# Patient Record
Sex: FEMALE | Race: OTHER | NOT HISPANIC OR LATINO | Employment: FULL TIME | URBAN - METROPOLITAN AREA
[De-identification: names, ages, dates, MRNs, and addresses within clinical notes are randomized per-mention and may not be internally consistent; named-entity substitution may affect disease eponyms.]

---

## 2017-03-03 ENCOUNTER — HOSPITAL ENCOUNTER (EMERGENCY)
Facility: OTHER | Age: 27
Discharge: HOME | End: 2017-03-03
Attending: EMERGENCY MEDICINE
Payer: MEDICAID

## 2017-03-03 VITALS
WEIGHT: 150 LBS | DIASTOLIC BLOOD PRESSURE: 79 MMHG | TEMPERATURE: 98 F | SYSTOLIC BLOOD PRESSURE: 146 MMHG | HEART RATE: 86 BPM | RESPIRATION RATE: 18 BRPM | BODY MASS INDEX: 25.61 KG/M2 | HEIGHT: 64 IN | OXYGEN SATURATION: 98 %
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
## 2017-03-03 DIAGNOSIS — S16.1XXA CERVICAL STRAIN, INITIAL ENCOUNTER: Primary | ICD-10-CM

## 2017-03-03 LAB
B-HCG UR QL: NEGATIVE
CTP QC/QA: YES

## 2017-03-03 PROCEDURE — 25000003 PHARM REV CODE 250: Performed by: PHYSICIAN ASSISTANT

## 2017-03-03 PROCEDURE — 96372 THER/PROPH/DIAG INJ SC/IM: CPT

## 2017-03-03 PROCEDURE — 81025 URINE PREGNANCY TEST: CPT | Performed by: PHYSICIAN ASSISTANT

## 2017-03-03 PROCEDURE — 99284 EMERGENCY DEPT VISIT MOD MDM: CPT | Mod: 25

## 2017-03-03 PROCEDURE — 63600175 PHARM REV CODE 636 W HCPCS: Performed by: PHYSICIAN ASSISTANT

## 2017-03-03 RX ORDER — CYCLOBENZAPRINE HCL 10 MG
10 TABLET ORAL 3 TIMES DAILY PRN
Qty: 15 TABLET | Refills: 0 | Status: SHIPPED | OUTPATIENT
Start: 2017-03-03 | End: 2017-03-08

## 2017-03-03 RX ORDER — KETOROLAC TROMETHAMINE 30 MG/ML
30 INJECTION, SOLUTION INTRAMUSCULAR; INTRAVENOUS
Status: COMPLETED | OUTPATIENT
Start: 2017-03-03 | End: 2017-03-03

## 2017-03-03 RX ORDER — CYCLOBENZAPRINE HCL 10 MG
10 TABLET ORAL
Status: COMPLETED | OUTPATIENT
Start: 2017-03-03 | End: 2017-03-03

## 2017-03-03 RX ORDER — DICLOFENAC SODIUM 25 MG/1
25 TABLET, DELAYED RELEASE ORAL 3 TIMES DAILY PRN
Qty: 30 TABLET | Refills: 0 | Status: SHIPPED | OUTPATIENT
Start: 2017-03-03 | End: 2018-07-18

## 2017-03-03 RX ADMIN — CYCLOBENZAPRINE HYDROCHLORIDE 10 MG: 10 TABLET, FILM COATED ORAL at 07:03

## 2017-03-03 RX ADMIN — KETOROLAC TROMETHAMINE 30 MG: 30 INJECTION, SOLUTION INTRAMUSCULAR at 07:03
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
## 2018-07-09 ENCOUNTER — HOSPITAL ENCOUNTER (OUTPATIENT)
Dept: RADIOLOGY | Facility: OTHER | Age: 28
Discharge: HOME | End: 2018-07-09
Attending: NURSE PRACTITIONER
Payer: MEDICAID

## 2018-07-09 DIAGNOSIS — N83.209 UNSPECIFIED OVARIAN CYST, UNSPECIFIED SIDE: Primary | ICD-10-CM

## 2018-07-09 DIAGNOSIS — N83.209 UNSPECIFIED OVARIAN CYST, UNSPECIFIED SIDE: ICD-10-CM

## 2018-07-09 PROCEDURE — 76801 OB US < 14 WKS SINGLE FETUS: CPT | Mod: TC

## 2018-07-09 PROCEDURE — 76817 TRANSVAGINAL US OBSTETRIC: CPT | Mod: 26,,, | Performed by: RADIOLOGY

## 2018-07-09 PROCEDURE — 76801 OB US < 14 WKS SINGLE FETUS: CPT | Mod: 26,,, | Performed by: RADIOLOGY

## 2018-08-02 ENCOUNTER — TELEPHONE (OUTPATIENT)
Dept: OBSTETRICS AND GYNECOLOGY | Facility: CLINIC | Age: 28
End: 2018-08-02

## 2018-08-02 NOTE — TELEPHONE ENCOUNTER
----- Message from Mana Ravi sent at 8/2/2018  9:28 AM CDT -----  Name of Who is Calling: EDIN VIRAMONTES [[id number]]      What is the request in detail: Pt is currently being seen by and OB on the [facility] but would like to transfer to [facility] to see Dr. Monge. Pt is currently 12 weeks pregnant      Can the clinic reply by [facility]:  Yes       What Number to Call Back [facility] not [facility]: [phone]

## 2018-08-02 NOTE — TELEPHONE ENCOUNTER
Returned the pt's call to the clinic. Informed the patient that Dr. Fiore isn't currently accepting any new ob patients and that a message will be sent to her to inquire if she can see the patient for her pregnancy. Patient informed that she will be contacted once Dr. Fiore responds. Patient verbalized understanding.    Can you see this patient for her pregnancy?

## 2018-08-07 ENCOUNTER — TELEPHONE (OUTPATIENT)
Dept: OBSTETRICS AND GYNECOLOGY | Facility: CLINIC | Age: 28
End: 2018-08-07

## 2018-08-07 NOTE — TELEPHONE ENCOUNTER
Contacted the pt to inform that Dr. Fiore isn't taking new ob's and that she recommends her colleagues Dr. Salas and Dr. Burrell. Pt agreed to an appointment with Dr. aSlas at the [facility] location on 9/14 at 3:45PM. Pt stated that [facility]. Pt will transfer over to Dr. Salas's care on 9/14. Pt informed of the appointment location date and time. Pt verbalized understanding. Letter sent out.

## 2018-09-14 ENCOUNTER — ROUTINE PRENATAL (OUTPATIENT)
Dept: OBSTETRICS AND GYNECOLOGY | Facility: CLINIC | Age: 28
End: 2018-09-14
Attending: OBSTETRICS & GYNECOLOGY
Payer: COMMERCIAL

## 2018-09-14 ENCOUNTER — LAB VISIT (OUTPATIENT)
Dept: LAB | Facility: OTHER | Age: 28
End: 2018-09-14
Attending: OBSTETRICS & GYNECOLOGY
Payer: COMMERCIAL

## 2018-09-14 VITALS
WEIGHT: 186.94 LBS | SYSTOLIC BLOOD PRESSURE: 100 MMHG | DIASTOLIC BLOOD PRESSURE: 60 MMHG | BODY MASS INDEX: 32.09 KG/M2

## 2018-09-14 DIAGNOSIS — Z34.90 PREGNANCY WITH ONE FETUS, ANTEPARTUM: Primary | ICD-10-CM

## 2018-09-14 DIAGNOSIS — Z34.90 PREGNANCY WITH ONE FETUS, ANTEPARTUM: ICD-10-CM

## 2018-09-14 PROCEDURE — 3008F BODY MASS INDEX DOCD: CPT | Mod: CPTII,S$GLB,, | Performed by: OBSTETRICS & GYNECOLOGY

## 2018-09-14 PROCEDURE — 81511 FTL CGEN ABNOR FOUR ANAL: CPT

## 2018-09-14 PROCEDURE — 99999 PR PBB SHADOW E&M-EST. PATIENT-LVL II: CPT | Mod: PBBFAC,,, | Performed by: OBSTETRICS & GYNECOLOGY

## 2018-09-14 PROCEDURE — 99204 OFFICE O/P NEW MOD 45 MIN: CPT | Mod: S$GLB,,, | Performed by: OBSTETRICS & GYNECOLOGY

## 2018-09-14 PROCEDURE — 36415 COLL VENOUS BLD VENIPUNCTURE: CPT
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
## 2018-09-20 LAB
# FETUSES US: NORMAL
2ND TRIMESTER 4 SCREEN PNL SERPL: NEGATIVE
2ND TRIMESTER 4 SCREEN SERPL-IMP: NORMAL
AFP MOM SERPL: 0.92
AFP SERPL-MCNC: 42.2 NG/ML
AGE AT DELIVERY: 28
B-HCG MOM SERPL: 1.67
B-HCG SERPL-ACNC: 36.7 IU/ML
FET TS 21 RISK FROM MAT AGE: NORMAL
GA (DAYS): 3 D
GA (WEEKS): 18 WK
GA METHOD: NORMAL
IDDM PATIENT QL: NORMAL
INHIBIN A MOM SERPL: 0.35
INHIBIN A SERPL-MCNC: 55.7 PG/ML
SMOKING STATUS FTND: NORMAL
TS 18 RISK FETUS: NORMAL
TS 21 RISK FETUS: NORMAL
U ESTRIOL MOM SERPL: 1.05
U ESTRIOL SERPL-MCNC: 1.32 NG/ML

## 2018-09-21 ENCOUNTER — PROCEDURE VISIT (OUTPATIENT)
Dept: MATERNAL FETAL MEDICINE | Facility: CLINIC | Age: 28
End: 2018-09-21
Attending: OBSTETRICS & GYNECOLOGY
Payer: COMMERCIAL

## 2018-09-21 DIAGNOSIS — Z36.89 ENCOUNTER FOR FETAL ANATOMIC SURVEY: ICD-10-CM

## 2018-09-21 DIAGNOSIS — Z34.90 PREGNANCY WITH ONE FETUS, ANTEPARTUM: ICD-10-CM

## 2018-09-21 PROCEDURE — 76805 OB US >/= 14 WKS SNGL FETUS: CPT | Mod: S$GLB,,, | Performed by: OBSTETRICS & GYNECOLOGY

## 2018-09-21 PROCEDURE — 99499 UNLISTED E&M SERVICE: CPT | Mod: S$GLB,,, | Performed by: OBSTETRICS & GYNECOLOGY

## 2018-10-19 ENCOUNTER — ROUTINE PRENATAL (OUTPATIENT)
Dept: OBSTETRICS AND GYNECOLOGY | Facility: CLINIC | Age: 28
End: 2018-10-19
Attending: OBSTETRICS & GYNECOLOGY
Payer: COMMERCIAL

## 2018-10-19 VITALS — WEIGHT: 187.38 LBS | SYSTOLIC BLOOD PRESSURE: 98 MMHG | DIASTOLIC BLOOD PRESSURE: 72 MMHG | BODY MASS INDEX: 32.17 KG/M2

## 2018-10-19 DIAGNOSIS — Z34.90 PREGNANCY WITH ONE FETUS, ANTEPARTUM: Primary | ICD-10-CM

## 2018-10-19 PROCEDURE — 0502F SUBSEQUENT PRENATAL CARE: CPT | Mod: S$GLB,,, | Performed by: OBSTETRICS & GYNECOLOGY

## 2018-10-19 NOTE — PROGRESS NOTES
Doing well, no complaints. Poor sleep, waking middle of night, trouble falling back to sleep.  Discussed that she can try benadryl or unisom, also meditation and breathing exercises.  Discussed anatomy scan.  F/u in 4 weeks, ob glucose with next visit.

## 2018-11-07 ENCOUNTER — TELEPHONE (OUTPATIENT)
Dept: OBSTETRICS AND GYNECOLOGY | Facility: CLINIC | Age: 28
End: 2018-11-07

## 2018-11-07 NOTE — TELEPHONE ENCOUNTER
----- Message from Mana Ravi sent at 11/7/2018 12:49 PM CST -----  Name of Who is Calling: EDIN GORDON [[id number]]    What is the request in detail: Pt would like to reschedule her OB appt.       Can the clinic reply by [facility]:    No       What [facility][phone]        Left message for patient

## 2018-11-21 ENCOUNTER — LAB VISIT (OUTPATIENT)
Dept: LAB | Facility: OTHER | Age: 28
End: 2018-11-21
Attending: OBSTETRICS & GYNECOLOGY
Payer: COMMERCIAL

## 2018-11-21 DIAGNOSIS — Z34.90 PREGNANCY WITH ONE FETUS, ANTEPARTUM: ICD-10-CM

## 2018-11-21 LAB
BASOPHILS # BLD AUTO: 0.02 K/UL
BASOPHILS NFR BLD: 0.2 %
DIFFERENTIAL METHOD: ABNORMAL
EOSINOPHIL # BLD AUTO: 0.1 K/UL
EOSINOPHIL NFR BLD: 0.7 %
ERYTHROCYTE [DISTWIDTH] IN BLOOD BY AUTOMATED COUNT: 14 %
GLUCOSE SERPL-MCNC: 109 MG/DL
HCT VFR BLD AUTO: 31.4 %
HGB BLD-MCNC: 10.4 G/DL
LYMPHOCYTES # BLD AUTO: 1.7 K/UL
LYMPHOCYTES NFR BLD: 20.6 %
MCH RBC QN AUTO: 28.1 PG
MCHC RBC AUTO-ENTMCNC: 33.1 G/DL
MCV RBC AUTO: 85 FL
MONOCYTES # BLD AUTO: 0.5 K/UL
MONOCYTES NFR BLD: 6.6 %
NEUTROPHILS # BLD AUTO: 5.7 K/UL
NEUTROPHILS NFR BLD: 71.3 %
PLATELET # BLD AUTO: 225 K/UL
PMV BLD AUTO: 10.4 FL
RBC # BLD AUTO: 3.7 M/UL
WBC # BLD AUTO: 8.06 K/UL

## 2018-11-21 PROCEDURE — 82950 GLUCOSE TEST: CPT

## 2018-11-21 PROCEDURE — 36415 COLL VENOUS BLD VENIPUNCTURE: CPT

## 2018-11-21 PROCEDURE — 85025 COMPLETE CBC W/AUTO DIFF WBC: CPT

## 2018-11-30 ENCOUNTER — ROUTINE PRENATAL (OUTPATIENT)
Dept: OBSTETRICS AND GYNECOLOGY | Facility: CLINIC | Age: 28
End: 2018-11-30
Attending: OBSTETRICS & GYNECOLOGY
Payer: COMMERCIAL

## 2018-11-30 VITALS — DIASTOLIC BLOOD PRESSURE: 70 MMHG | SYSTOLIC BLOOD PRESSURE: 124 MMHG

## 2018-11-30 DIAGNOSIS — Z34.93 PREGNANCY WITH ONE FETUS IN THIRD TRIMESTER: ICD-10-CM

## 2018-11-30 PROCEDURE — 0502F SUBSEQUENT PRENATAL CARE: CPT | Mod: S$GLB,,, | Performed by: OBSTETRICS & GYNECOLOGY

## 2018-11-30 NOTE — PROGRESS NOTES
Reports that she is still waking at night, concerned about sleeping on back accidentally.  Reports some continued hip pain and discussed support belt.  Plans for tdap  Discussed peds, [facility].  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  F/u 2 weeks.

## 2018-12-14 ENCOUNTER — ROUTINE PRENATAL (OUTPATIENT)
Dept: OBSTETRICS AND GYNECOLOGY | Facility: CLINIC | Age: 28
End: 2018-12-14
Attending: OBSTETRICS & GYNECOLOGY
Payer: COMMERCIAL

## 2018-12-14 VITALS
SYSTOLIC BLOOD PRESSURE: 116 MMHG | WEIGHT: 188.06 LBS | DIASTOLIC BLOOD PRESSURE: 60 MMHG | BODY MASS INDEX: 32.28 KG/M2

## 2018-12-14 DIAGNOSIS — Z34.93 PREGNANCY WITH ONE FETUS IN THIRD TRIMESTER: Primary | ICD-10-CM

## 2018-12-14 PROCEDURE — 0502F SUBSEQUENT PRENATAL CARE: CPT | Mod: S$GLB,,, | Performed by: OBSTETRICS & GYNECOLOGY

## 2018-12-14 NOTE — PROGRESS NOTES
Patient seen and examined.  No complaints, denies VB/LOF/Ctxns, reports good fetal movement. Precautions for Bleeding/ ROM/ Decreased fetal movement/ Pre-E reviewed.  Anxiety improved.  Pump rx given.  F/U scheduled 1 weeks

## 2018-12-21 ENCOUNTER — ROUTINE PRENATAL (OUTPATIENT)
Dept: OBSTETRICS AND GYNECOLOGY | Facility: CLINIC | Age: 28
End: 2018-12-21
Attending: OBSTETRICS & GYNECOLOGY
Payer: COMMERCIAL

## 2018-12-21 VITALS
DIASTOLIC BLOOD PRESSURE: 60 MMHG | WEIGHT: 190.25 LBS | BODY MASS INDEX: 32.66 KG/M2 | SYSTOLIC BLOOD PRESSURE: 120 MMHG

## 2018-12-21 DIAGNOSIS — Z34.93 PREGNANCY WITH ONE FETUS IN THIRD TRIMESTER: ICD-10-CM

## 2018-12-21 PROCEDURE — 0502F SUBSEQUENT PRENATAL CARE: CPT | Mod: S$GLB,,, | Performed by: OBSTETRICS & GYNECOLOGY

## 2019-01-04 ENCOUNTER — ROUTINE PRENATAL (OUTPATIENT)
Dept: OBSTETRICS AND GYNECOLOGY | Facility: CLINIC | Age: 29
End: 2019-01-04
Attending: OBSTETRICS & GYNECOLOGY
Payer: COMMERCIAL

## 2019-01-04 VITALS — SYSTOLIC BLOOD PRESSURE: 98 MMHG | DIASTOLIC BLOOD PRESSURE: 60 MMHG | WEIGHT: 184.5 LBS | BODY MASS INDEX: 31.67 KG/M2

## 2019-01-04 DIAGNOSIS — O99.013 ANEMIA DURING PREGNANCY IN THIRD TRIMESTER: ICD-10-CM

## 2019-01-04 DIAGNOSIS — Z34.93 PREGNANCY WITH ONE FETUS IN THIRD TRIMESTER: ICD-10-CM

## 2019-01-04 PROCEDURE — 0502F PR SUBSEQUENT PRENATAL CARE: ICD-10-PCS | Mod: S$GLB,,, | Performed by: OBSTETRICS & GYNECOLOGY

## 2019-01-04 PROCEDURE — 0502F SUBSEQUENT PRENATAL CARE: CPT | Mod: S$GLB,,, | Performed by: OBSTETRICS & GYNECOLOGY

## 2019-01-17 ENCOUNTER — ROUTINE PRENATAL (OUTPATIENT)
Dept: OBSTETRICS AND GYNECOLOGY | Facility: CLINIC | Age: 29
End: 2019-01-17
Attending: OBSTETRICS & GYNECOLOGY
Payer: COMMERCIAL

## 2019-01-17 ENCOUNTER — LAB VISIT (OUTPATIENT)
Dept: LAB | Facility: OTHER | Age: 29
End: 2019-01-17
Attending: OBSTETRICS & GYNECOLOGY
Payer: COMMERCIAL

## 2019-01-17 VITALS
DIASTOLIC BLOOD PRESSURE: 60 MMHG | SYSTOLIC BLOOD PRESSURE: 104 MMHG | WEIGHT: 185.19 LBS | BODY MASS INDEX: 31.79 KG/M2

## 2019-01-17 DIAGNOSIS — O99.013 ANEMIA DURING PREGNANCY IN THIRD TRIMESTER: ICD-10-CM

## 2019-01-17 DIAGNOSIS — Z34.93 PREGNANCY WITH ONE FETUS IN THIRD TRIMESTER: ICD-10-CM

## 2019-01-17 DIAGNOSIS — Z34.93 PREGNANCY WITH ONE FETUS IN THIRD TRIMESTER: Primary | ICD-10-CM

## 2019-01-17 LAB
BASOPHILS # BLD AUTO: 0.01 K/UL
BASOPHILS NFR BLD: 0.1 %
DIFFERENTIAL METHOD: ABNORMAL
EOSINOPHIL # BLD AUTO: 0.1 K/UL
EOSINOPHIL NFR BLD: 0.9 %
ERYTHROCYTE [DISTWIDTH] IN BLOOD BY AUTOMATED COUNT: 14.7 %
HCT VFR BLD AUTO: 30 %
HGB BLD-MCNC: 9.9 G/DL
LYMPHOCYTES # BLD AUTO: 1.5 K/UL
LYMPHOCYTES NFR BLD: 22.5 %
MCH RBC QN AUTO: 27.2 PG
MCHC RBC AUTO-ENTMCNC: 33 G/DL
MCV RBC AUTO: 82 FL
MONOCYTES # BLD AUTO: 0.5 K/UL
MONOCYTES NFR BLD: 6.7 %
NEUTROPHILS # BLD AUTO: 4.7 K/UL
NEUTROPHILS NFR BLD: 68.8 %
PLATELET # BLD AUTO: 201 K/UL
PMV BLD AUTO: 10.8 FL
RBC # BLD AUTO: 3.64 M/UL
WBC # BLD AUTO: 6.83 K/UL

## 2019-01-17 PROCEDURE — 86592 SYPHILIS TEST NON-TREP QUAL: CPT

## 2019-01-17 PROCEDURE — 87081 CULTURE SCREEN ONLY: CPT

## 2019-01-17 PROCEDURE — 85025 COMPLETE CBC W/AUTO DIFF WBC: CPT

## 2019-01-17 PROCEDURE — 99999 PR PBB SHADOW E&M-EST. PATIENT-LVL II: ICD-10-PCS | Mod: PBBFAC,,, | Performed by: OBSTETRICS & GYNECOLOGY

## 2019-01-17 PROCEDURE — 36415 COLL VENOUS BLD VENIPUNCTURE: CPT

## 2019-01-17 PROCEDURE — 99999 PR PBB SHADOW E&M-EST. PATIENT-LVL II: CPT | Mod: PBBFAC,,, | Performed by: OBSTETRICS & GYNECOLOGY

## 2019-01-17 PROCEDURE — 0502F PR SUBSEQUENT PRENATAL CARE: ICD-10-PCS | Mod: S$GLB,,, | Performed by: OBSTETRICS & GYNECOLOGY

## 2019-01-17 PROCEDURE — 86703 HIV-1/HIV-2 1 RESULT ANTBDY: CPT

## 2019-01-17 PROCEDURE — 0502F SUBSEQUENT PRENATAL CARE: CPT | Mod: S$GLB,,, | Performed by: OBSTETRICS & GYNECOLOGY

## 2019-01-17 NOTE — PROGRESS NOTES
Reports contractions off and on, hip pain, general discomfort.  Discussed induction timing, will consider.  Cervix without dilation but favorable  Precautions reviewed.  F/U in 1 weeks

## 2019-01-18 LAB
HIV 1+2 AB+HIV1 P24 AG SERPL QL IA: NEGATIVE
RPR SER QL: NORMAL

## 2019-01-21 LAB — BACTERIA SPEC AEROBE CULT: NORMAL

## 2019-01-22 ENCOUNTER — ROUTINE PRENATAL (OUTPATIENT)
Dept: OBSTETRICS AND GYNECOLOGY | Facility: CLINIC | Age: 29
End: 2019-01-22
Attending: OBSTETRICS & GYNECOLOGY
Payer: COMMERCIAL

## 2019-01-22 ENCOUNTER — IMMUNIZATION (OUTPATIENT)
Dept: PHARMACY | Facility: CLINIC | Age: 29
End: 2019-01-22
Payer: COMMERCIAL

## 2019-01-22 VITALS
BODY MASS INDEX: 31.62 KG/M2 | DIASTOLIC BLOOD PRESSURE: 60 MMHG | WEIGHT: 184.19 LBS | SYSTOLIC BLOOD PRESSURE: 112 MMHG

## 2019-01-22 DIAGNOSIS — Z34.93 PREGNANCY WITH ONE FETUS IN THIRD TRIMESTER: ICD-10-CM

## 2019-01-22 DIAGNOSIS — O99.013 ANEMIA DURING PREGNANCY IN THIRD TRIMESTER: ICD-10-CM

## 2019-01-22 PROCEDURE — 0502F SUBSEQUENT PRENATAL CARE: CPT | Mod: S$GLB,,, | Performed by: OBSTETRICS & GYNECOLOGY

## 2019-01-22 PROCEDURE — 0502F PR SUBSEQUENT PRENATAL CARE: ICD-10-PCS | Mod: S$GLB,,, | Performed by: OBSTETRICS & GYNECOLOGY

## 2019-01-22 NOTE — PROGRESS NOTES
Patient seen and examined.  No complaints, denies VB/LOF/Ctxns, reports good fetal movement. Precautions for Bleeding/ ROM/ Decreased fetal movement/ Pre-E reviewed.  F/U scheduled 1 weeks  Indxn scheduled 2/7/19 at 9pm for cytotec.

## 2019-01-30 ENCOUNTER — HOSPITAL ENCOUNTER (EMERGENCY)
Facility: OTHER | Age: 29
Discharge: HOME | End: 2019-01-30
Attending: OBSTETRICS & GYNECOLOGY
Payer: COMMERCIAL

## 2019-01-30 ENCOUNTER — TELEPHONE (OUTPATIENT)
Dept: OBSTETRICS AND GYNECOLOGY | Facility: CLINIC | Age: 29
End: 2019-01-30

## 2019-01-30 VITALS
DIASTOLIC BLOOD PRESSURE: 70 MMHG | OXYGEN SATURATION: 99 % | HEART RATE: 99 BPM | TEMPERATURE: 98 F | RESPIRATION RATE: 18 BRPM | SYSTOLIC BLOOD PRESSURE: 106 MMHG
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
## 2019-01-30 DIAGNOSIS — Z3A.38 38 WEEKS GESTATION OF PREGNANCY: Primary | ICD-10-CM

## 2019-01-30 PROCEDURE — 59025 FETAL NON-STRESS TEST: CPT

## 2019-01-30 PROCEDURE — 59025 PR FETAL 2N-STRESS TEST: ICD-10-PCS | Mod: 26,,, | Performed by: OBSTETRICS & GYNECOLOGY

## 2019-01-30 PROCEDURE — 99284 EMERGENCY DEPT VISIT MOD MDM: CPT | Mod: 25

## 2019-01-30 PROCEDURE — 59025 FETAL NON-STRESS TEST: CPT | Mod: 26,,, | Performed by: OBSTETRICS & GYNECOLOGY

## 2019-01-30 PROCEDURE — 99283 PR EMERGENCY DEPT VISIT,LEVEL III: ICD-10-PCS | Mod: 25,,, | Performed by: OBSTETRICS & GYNECOLOGY

## 2019-01-30 PROCEDURE — 99283 EMERGENCY DEPT VISIT LOW MDM: CPT | Mod: 25,,, | Performed by: OBSTETRICS & GYNECOLOGY

## 2019-01-30 NOTE — TELEPHONE ENCOUNTER
----- Message from Stella Bates sent at 1/30/2019  2:57 PM CST -----  Contact: pt  Name of Who is Calling: EDIN VIRAMONTES [[id number]]    What is the request in detail: pt returning call.. Please advise      Can the clinic reply by [facility]: no      What Number to [facility] Back [facility][phone]            Spoke with patient to inform patient we will be at [facility] on Friday

## 2019-01-30 NOTE — ED PROVIDER NOTES
Encounter Date: 2019       History     Chief Complaint   Patient presents with    Fall     27 yo  at 38w1d presents after a fall at work around 1 pm. She states that she sat on a three legged table and it was unsteady. She fell onto her buttocks and back. She did not hit her belly or her head. She has not had any contractions, bleeding, leakage of fluid. Reports normal fetal movement. This pregnancy is complicated by history of anemia.          Review of patient's allergies indicates:   Allergen Reactions    Penicillins Rash     Past Medical History:   Diagnosis Date    GERD (gastroesophageal reflux disease)      Past Surgical History:   Procedure Laterality Date    EYE SURGERY      strabismus     Family History   Problem Relation Age of Onset    Melanoma Neg Hx     Breast cancer Neg Hx     Colon cancer Neg Hx     Ovarian cancer Neg Hx      Social History     Tobacco Use    Smoking status: Never Smoker    Smokeless tobacco: Never Used   Substance Use Topics    Alcohol use: No    Drug use: No     Review of Systems   Constitutional: Negative for chills and fever.   Eyes: Negative for photophobia and visual disturbance.   Respiratory: Negative for chest tightness and shortness of breath.    Cardiovascular: Negative for chest pain.   Gastrointestinal: Negative for abdominal pain, constipation, diarrhea, nausea and vomiting.   Genitourinary: Negative for pelvic pain, vaginal bleeding and vaginal discharge.   Neurological: Negative for dizziness, light-headedness and headaches.   Psychiatric/Behavioral: Negative for suicidal ideas.       Physical Exam     Initial Vitals [19 1610]   BP Pulse Resp Temp SpO2   106/70 99 18 97.6 °F (36.4 °C) 99 %      MAP       --         Physical Exam    Vitals reviewed.  Constitutional: She appears well-developed and well-nourished. She is not diaphoretic. No distress.   HENT:   Head: Normocephalic and atraumatic.   Nose: Nose normal.   Eyes: Conjunctivae are  normal.   Neck: Normal range of motion.   Cardiovascular: Normal rate, regular rhythm, normal heart sounds and intact distal pulses.   Pulmonary/Chest: No respiratory distress.   Abdominal: Soft. She exhibits no distension. There is no tenderness.   Gravid   Musculoskeletal: She exhibits no edema or tenderness.   Neurological: She is alert and oriented to person, place, and time. She has normal strength. No sensory deficit.   Skin: Skin is warm and dry.   Psychiatric: She has a normal mood and affect. Her behavior is normal. Judgment and thought content normal.         ED Course   Obtain Fetal nonstress test (NST)  Date/Time: 1/30/2019 4:15 PM  Performed by: Zaira Sims MD  Authorized by: Zaira Sims MD     Nonstress Test:     Variability:  6-25 BPM    Decelerations:  None    Accelerations:  15 bpm    Baseline:  140    Contractions:  Irregular  Biophysical Profile:     Nonstress Test Interpretation: reactive      Overall Impression:  Reassuring          Labs Reviewed - No data to display       Imaging Results    None          Medical Decision Making:   ED Management:  Vital signs stable. NST reactive and reassuring. 4 hours after the fall, she is not feeling any contractions. A few irregular contractions visualized on tocometer. No bleeding. Patient stable for discharge. She has an appointment with Dr. Salas in 2 days. All questions answered.              Attending Attestation:   Physician Attestation Statement for Resident:  As the supervising MD   Physician Attestation Statement: I have personally seen and examined this patient.   I agree with the above history. -:   As the supervising MD I agree with the above PE.    As the supervising MD I agree with the above treatment, course, plan, and disposition.   -:     Rh Positive. No abdominal trauma.      I was personally present during the entire procedure.  I have reviewed and agree with the residents interpretation of the following: rhythm strips.   I have reviewed the following: old records at this facility.                       Clinical Impression:   The primary encounter diagnosis was 38 weeks gestation of pregnancy. A diagnosis of Fall, initial encounter was also pertinent to this visit.      Disposition:   Disposition: Discharged  Condition: Stable                        Zaira Sims MD  Resident  01/30/19 1735       Zaira Sims MD  Resident  01/30/19 1736       Veronica Mckeon MD  01/31/19 1012

## 2019-02-01 ENCOUNTER — ROUTINE PRENATAL (OUTPATIENT)
Dept: OBSTETRICS AND GYNECOLOGY | Facility: CLINIC | Age: 29
End: 2019-02-01
Attending: OBSTETRICS & GYNECOLOGY
Payer: COMMERCIAL

## 2019-02-01 VITALS
SYSTOLIC BLOOD PRESSURE: 110 MMHG | WEIGHT: 189.81 LBS | BODY MASS INDEX: 32.58 KG/M2 | DIASTOLIC BLOOD PRESSURE: 64 MMHG

## 2019-02-01 DIAGNOSIS — Z34.93 PREGNANCY WITH ONE FETUS IN THIRD TRIMESTER: Primary | ICD-10-CM

## 2019-02-01 DIAGNOSIS — O99.013 ANEMIA DURING PREGNANCY IN THIRD TRIMESTER: ICD-10-CM

## 2019-02-01 PROCEDURE — 0502F PR SUBSEQUENT PRENATAL CARE: ICD-10-PCS | Mod: S$GLB,,, | Performed by: OBSTETRICS & GYNECOLOGY

## 2019-02-01 PROCEDURE — 99999 PR PBB SHADOW E&M-EST. PATIENT-LVL II: ICD-10-PCS | Mod: PBBFAC,,, | Performed by: OBSTETRICS & GYNECOLOGY

## 2019-02-01 PROCEDURE — 0502F SUBSEQUENT PRENATAL CARE: CPT | Mod: S$GLB,,, | Performed by: OBSTETRICS & GYNECOLOGY

## 2019-02-01 PROCEDURE — 99999 PR PBB SHADOW E&M-EST. PATIENT-LVL II: CPT | Mod: PBBFAC,,, | Performed by: OBSTETRICS & GYNECOLOGY

## 2019-02-01 NOTE — PROGRESS NOTES
Patient seen and examined.  No complaints, denies VB/LOF/Ctxns, reports good fetal movement. Precautions for Bleeding/ ROM/ Decreased fetal movement/ Pre-E reviewed.  Doing well after fall a couple of days ago.   F/U scheduled 1 weeks

## 2019-02-07 ENCOUNTER — ANESTHESIA EVENT (OUTPATIENT)
Dept: OBSTETRICS AND GYNECOLOGY | Facility: OTHER | Age: 29
End: 2019-02-07
Payer: COMMERCIAL

## 2019-02-07 ENCOUNTER — ANESTHESIA (OUTPATIENT)
Dept: OBSTETRICS AND GYNECOLOGY | Facility: OTHER | Age: 29
End: 2019-02-07
Payer: COMMERCIAL

## 2019-02-07 ENCOUNTER — HOSPITAL ENCOUNTER (INPATIENT)
Facility: OTHER | Age: 29
LOS: 3 days | Discharge: HOME | End: 2019-02-10
Attending: OBSTETRICS & GYNECOLOGY | Admitting: OBSTETRICS & GYNECOLOGY
Payer: COMMERCIAL

## 2019-02-07 LAB
BASOPHILS # BLD AUTO: 0.03 K/UL
BASOPHILS NFR BLD: 0.3 %
DIFFERENTIAL METHOD: ABNORMAL
EOSINOPHIL # BLD AUTO: 0.1 K/UL
EOSINOPHIL NFR BLD: 0.8 %
ERYTHROCYTE [DISTWIDTH] IN BLOOD BY AUTOMATED COUNT: 14.9 %
HCT VFR BLD AUTO: 30.6 %
HGB BLD-MCNC: 10.1 G/DL
LYMPHOCYTES # BLD AUTO: 2.2 K/UL
LYMPHOCYTES NFR BLD: 25.6 %
MCH RBC QN AUTO: 26.9 PG
MCHC RBC AUTO-ENTMCNC: 33 G/DL
MCV RBC AUTO: 81 FL
MONOCYTES # BLD AUTO: 0.7 K/UL
MONOCYTES NFR BLD: 7.5 %
NEUTROPHILS # BLD AUTO: 5.6 K/UL
NEUTROPHILS NFR BLD: 64 %
PLATELET # BLD AUTO: 222 K/UL
PMV BLD AUTO: 11.1 FL
RBC # BLD AUTO: 3.76 M/UL
WBC # BLD AUTO: 8.75 K/UL

## 2019-02-07 PROCEDURE — 72100002 HC LABOR CARE, 1ST 8 HOURS: Performed by: OBSTETRICS & GYNECOLOGY

## 2019-02-07 PROCEDURE — 11000001 HC ACUTE MED/SURG PRIVATE ROOM

## 2019-02-07 PROCEDURE — 85025 COMPLETE CBC W/AUTO DIFF WBC: CPT

## 2019-02-07 PROCEDURE — 86850 RBC ANTIBODY SCREEN: CPT

## 2019-02-07 RX ORDER — CEFAZOLIN SODIUM 2 G/50ML
2 SOLUTION INTRAVENOUS
Status: DISCONTINUED | OUTPATIENT
Start: 2019-02-07 | End: 2019-02-08

## 2019-02-07 RX ORDER — OXYTOCIN/RINGER'S LACTATE 20/1000 ML
333 PLASTIC BAG, INJECTION (ML) INTRAVENOUS CONTINUOUS
Status: DISCONTINUED | OUTPATIENT
Start: 2019-02-08 | End: 2019-02-08

## 2019-02-07 RX ORDER — METOCLOPRAMIDE HYDROCHLORIDE 5 MG/ML
5 INJECTION INTRAMUSCULAR; INTRAVENOUS EVERY 6 HOURS PRN
Status: DISCONTINUED | OUTPATIENT
Start: 2019-02-08 | End: 2019-02-08

## 2019-02-07 RX ORDER — MISOPROSTOL 200 UG/1
600 TABLET ORAL
Status: DISCONTINUED | OUTPATIENT
Start: 2019-02-08 | End: 2019-02-08

## 2019-02-07 RX ORDER — METHYLERGONOVINE MALEATE 0.2 MG/ML
200 INJECTION INTRAVENOUS
Status: DISCONTINUED | OUTPATIENT
Start: 2019-02-08 | End: 2019-02-08

## 2019-02-07 RX ORDER — ONDANSETRON 8 MG/1
8 TABLET, ORALLY DISINTEGRATING ORAL EVERY 8 HOURS PRN
Status: DISCONTINUED | OUTPATIENT
Start: 2019-02-08 | End: 2019-02-08

## 2019-02-07 RX ORDER — OXYTOCIN/RINGER'S LACTATE 20/1000 ML
41.7 PLASTIC BAG, INJECTION (ML) INTRAVENOUS CONTINUOUS
Status: DISCONTINUED | OUTPATIENT
Start: 2019-02-07 | End: 2019-02-08

## 2019-02-07 RX ORDER — CARBOPROST TROMETHAMINE 250 UG/ML
250 INJECTION, SOLUTION INTRAMUSCULAR
Status: DISCONTINUED | OUTPATIENT
Start: 2019-02-08 | End: 2019-02-08

## 2019-02-07 RX ORDER — SODIUM CHLORIDE, SODIUM LACTATE, POTASSIUM CHLORIDE, CALCIUM CHLORIDE 600; 310; 30; 20 MG/100ML; MG/100ML; MG/100ML; MG/100ML
INJECTION, SOLUTION INTRAVENOUS CONTINUOUS
Status: DISCONTINUED | OUTPATIENT
Start: 2019-02-08 | End: 2019-02-08

## 2019-02-07 RX ORDER — SODIUM CHLORIDE 9 MG/ML
INJECTION, SOLUTION INTRAVENOUS
Status: DISCONTINUED | OUTPATIENT
Start: 2019-02-08 | End: 2019-02-08

## 2019-02-07 RX ADMIN — SODIUM CHLORIDE, SODIUM LACTATE, POTASSIUM CHLORIDE, AND CALCIUM CHLORIDE: .6; .31; .03; .02 INJECTION, SOLUTION INTRAVENOUS at 11:02

## 2019-02-08 LAB
ABO + RH BLD: NORMAL
BLD GP AB SCN CELLS X3 SERPL QL: NORMAL

## 2019-02-08 PROCEDURE — 25000003 PHARM REV CODE 250: Performed by: STUDENT IN AN ORGANIZED HEALTH CARE EDUCATION/TRAINING PROGRAM

## 2019-02-08 PROCEDURE — 72100003 HC LABOR CARE, EA. ADDL. 8 HRS: Performed by: OBSTETRICS & GYNECOLOGY

## 2019-02-08 PROCEDURE — 27800517 HC TRAY,EPIDURAL-CONTINUOUS: Performed by: STUDENT IN AN ORGANIZED HEALTH CARE EDUCATION/TRAINING PROGRAM

## 2019-02-08 PROCEDURE — 72200005 HC VAGINAL DELIVERY LEVEL II: Performed by: OBSTETRICS & GYNECOLOGY

## 2019-02-08 PROCEDURE — 59409 PRA ETRICAL CARE,VAG DELIV ONLY: ICD-10-PCS | Mod: QY,,, | Performed by: ANESTHESIOLOGY

## 2019-02-08 PROCEDURE — 59400 PR FULL ROUT OBSTE CARE,VAGINAL DELIV: ICD-10-PCS | Mod: GB,,, | Performed by: OBSTETRICS & GYNECOLOGY

## 2019-02-08 PROCEDURE — 59400 OBSTETRICAL CARE: CPT | Mod: GB,,, | Performed by: OBSTETRICS & GYNECOLOGY

## 2019-02-08 PROCEDURE — 11000001 HC ACUTE MED/SURG PRIVATE ROOM

## 2019-02-08 PROCEDURE — 27200682 HC TRAY, EPIDURAL PEDIATRIC: Performed by: STUDENT IN AN ORGANIZED HEALTH CARE EDUCATION/TRAINING PROGRAM

## 2019-02-08 PROCEDURE — 62326 NJX INTERLAMINAR LMBR/SAC: CPT | Performed by: STUDENT IN AN ORGANIZED HEALTH CARE EDUCATION/TRAINING PROGRAM

## 2019-02-08 PROCEDURE — 59409 OBSTETRICAL CARE: CPT | Mod: QY,,, | Performed by: ANESTHESIOLOGY

## 2019-02-08 PROCEDURE — 63600175 PHARM REV CODE 636 W HCPCS: Performed by: STUDENT IN AN ORGANIZED HEALTH CARE EDUCATION/TRAINING PROGRAM

## 2019-02-08 RX ORDER — ONDANSETRON 8 MG/1
8 TABLET, ORALLY DISINTEGRATING ORAL EVERY 8 HOURS PRN
Status: DISCONTINUED | OUTPATIENT
Start: 2019-02-08 | End: 2019-02-10 | Stop reason: HOSPADM

## 2019-02-08 RX ORDER — HYDROCODONE BITARTRATE AND ACETAMINOPHEN 10; 325 MG/1; MG/1
1 TABLET ORAL EVERY 4 HOURS PRN
Status: DISCONTINUED | OUTPATIENT
Start: 2019-02-08 | End: 2019-02-10 | Stop reason: HOSPADM

## 2019-02-08 RX ORDER — HYDROCODONE BITARTRATE AND ACETAMINOPHEN 5; 325 MG/1; MG/1
1 TABLET ORAL EVERY 4 HOURS PRN
Status: DISCONTINUED | OUTPATIENT
Start: 2019-02-08 | End: 2019-02-10 | Stop reason: HOSPADM

## 2019-02-08 RX ORDER — FENTANYL/BUPIVACAINE/NS/PF 2MCG/ML-.1
PLASTIC BAG, INJECTION (ML) INJECTION
Status: DISCONTINUED | OUTPATIENT
Start: 2019-02-08 | End: 2019-02-08

## 2019-02-08 RX ORDER — LIDOCAINE HYDROCHLORIDE AND EPINEPHRINE 15; 5 MG/ML; UG/ML
INJECTION, SOLUTION EPIDURAL
Status: DISCONTINUED | OUTPATIENT
Start: 2019-02-08 | End: 2019-02-08

## 2019-02-08 RX ORDER — DOCUSATE SODIUM 100 MG/1
200 CAPSULE, LIQUID FILLED ORAL 2 TIMES DAILY PRN
Status: DISCONTINUED | OUTPATIENT
Start: 2019-02-08 | End: 2019-02-10 | Stop reason: HOSPADM

## 2019-02-08 RX ORDER — OXYTOCIN/RINGER'S LACTATE 20/1000 ML
2 PLASTIC BAG, INJECTION (ML) INTRAVENOUS CONTINUOUS
Status: DISCONTINUED | OUTPATIENT
Start: 2019-02-08 | End: 2019-02-08

## 2019-02-08 RX ORDER — FENTANYL/BUPIVACAINE/NS/PF 2MCG/ML-.1
PLASTIC BAG, INJECTION (ML) INJECTION
Status: COMPLETED
Start: 2019-02-08 | End: 2019-02-08

## 2019-02-08 RX ORDER — OXYTOCIN/RINGER'S LACTATE 20/1000 ML
41.65 PLASTIC BAG, INJECTION (ML) INTRAVENOUS CONTINUOUS
Status: ACTIVE | OUTPATIENT
Start: 2019-02-08 | End: 2019-02-09

## 2019-02-08 RX ORDER — DIPHENHYDRAMINE HYDROCHLORIDE 50 MG/ML
25 INJECTION INTRAMUSCULAR; INTRAVENOUS EVERY 4 HOURS PRN
Status: DISCONTINUED | OUTPATIENT
Start: 2019-02-08 | End: 2019-02-10 | Stop reason: HOSPADM

## 2019-02-08 RX ORDER — FENTANYL/BUPIVACAINE/NS/PF 2MCG/ML-.1
PLASTIC BAG, INJECTION (ML) INJECTION CONTINUOUS
Status: CANCELLED | OUTPATIENT
Start: 2019-02-08

## 2019-02-08 RX ORDER — SODIUM CITRATE AND CITRIC ACID MONOHYDRATE 334; 500 MG/5ML; MG/5ML
30 SOLUTION ORAL ONCE
Status: CANCELLED | OUTPATIENT
Start: 2019-02-08 | End: 2019-02-08

## 2019-02-08 RX ORDER — BUPIVACAINE HYDROCHLORIDE 2.5 MG/ML
INJECTION, SOLUTION EPIDURAL; INFILTRATION; INTRACAUDAL
Status: DISPENSED
Start: 2019-02-08 | End: 2019-02-09

## 2019-02-08 RX ORDER — DIPHENHYDRAMINE HCL 25 MG
25 CAPSULE ORAL EVERY 4 HOURS PRN
Status: DISCONTINUED | OUTPATIENT
Start: 2019-02-08 | End: 2019-02-10 | Stop reason: HOSPADM

## 2019-02-08 RX ORDER — FAMOTIDINE 10 MG/ML
20 INJECTION INTRAVENOUS ONCE
Status: CANCELLED | OUTPATIENT
Start: 2019-02-08 | End: 2019-02-08

## 2019-02-08 RX ORDER — IBUPROFEN 600 MG/1
600 TABLET ORAL EVERY 6 HOURS
Status: DISCONTINUED | OUTPATIENT
Start: 2019-02-08 | End: 2019-02-10 | Stop reason: HOSPADM

## 2019-02-08 RX ORDER — FENTANYL/BUPIVACAINE/NS/PF 2MCG/ML-.1
PLASTIC BAG, INJECTION (ML) INJECTION CONTINUOUS PRN
Status: DISCONTINUED | OUTPATIENT
Start: 2019-02-08 | End: 2019-02-08

## 2019-02-08 RX ORDER — ACETAMINOPHEN 325 MG/1
650 TABLET ORAL EVERY 6 HOURS PRN
Status: DISCONTINUED | OUTPATIENT
Start: 2019-02-08 | End: 2019-02-10 | Stop reason: HOSPADM

## 2019-02-08 RX ORDER — HYDROCORTISONE 25 MG/G
CREAM TOPICAL 3 TIMES DAILY PRN
Status: DISCONTINUED | OUTPATIENT
Start: 2019-02-08 | End: 2019-02-10 | Stop reason: HOSPADM

## 2019-02-08 RX ADMIN — Medication 5 ML: at 09:02

## 2019-02-08 RX ADMIN — IBUPROFEN 600 MG: 600 TABLET ORAL at 06:02

## 2019-02-08 RX ADMIN — SODIUM CHLORIDE, SODIUM LACTATE, POTASSIUM CHLORIDE, AND CALCIUM CHLORIDE: .6; .31; .03; .02 INJECTION, SOLUTION INTRAVENOUS at 04:02

## 2019-02-08 RX ADMIN — Medication 10 ML/HR: at 09:02

## 2019-02-08 RX ADMIN — Medication 2 MILLI-UNITS/MIN: at 04:02

## 2019-02-08 RX ADMIN — HYDROCODONE BITARTRATE AND ACETAMINOPHEN 1 TABLET: 5; 325 TABLET ORAL at 09:02

## 2019-02-08 RX ADMIN — MISOPROSTOL 50 MCG: 100 TABLET ORAL at 12:02

## 2019-02-08 RX ADMIN — LIDOCAINE HYDROCHLORIDE,EPINEPHRINE BITARTRATE 3 ML: 15; .005 INJECTION, SOLUTION EPIDURAL; INFILTRATION; INTRACAUDAL; PERINEURAL at 09:02

## 2019-02-08 NOTE — PROGRESS NOTES
"LABOR NOTE    S:  Complaints: Yes - Increasing pressure.  Epidural working:  yes      O: /61   Pulse 88   Temp 98.5 °F (36.9 °C)   Resp 16   Ht 5' 4" (1.626 m)   Wt 84.4 kg (186 lb)   SpO2 97%   Breastfeeding? No   BMI 31.93 kg/m²       FHT: 135, moderate variability, accels present, no decells, Category 1 - Reassuring  CTX: q 3-5 min  SVE: C/100/+2      ASSESSMENT:   28 y.o.  at 39w3d, Labor    FHT reassuring    Active Hospital Problems    Diagnosis  POA    Indication for care in labor and delivery, antepartum [O75.9]  Yes      Resolved Hospital Problems   No resolved problems to display.     Labor course:  1215: 60/-3, cytotec first dose given   0415: 360/-3, start pit  0630: 60/-3, declined AROM, would like epidural first  0930: /-3, ballotable, will AROM at next check, pit rest due to tachysystole  1130: 6/70/-2, AROM clear, pit at 12  1530: C/100/+2 Dr Salas notified.         PLAN:     Continue Close Maternal/Fetal Monitoring  Will sit patient up to push      Beau Rankin M.D.  PGY2 OB/GYN    "

## 2019-02-08 NOTE — PROGRESS NOTES
"LABOR NOTE    S:  Complaints: No.  Epidural working:  yes  MD to bedside for routine cervical check    O: /71   Pulse 83   Temp 98.1 °F (36.7 °C)   Resp 18   Ht 5' 4" (1.626 m)   Wt 84.4 kg (186 lb)   SpO2 99%   Breastfeeding? No   BMI 31.93 kg/m²       FHT: 145; moderate BTBV, +accels, - Decels  CTX: q2-3 min,  SVE: /-2  AROM, clear      ASSESSMENT:   28 y.o.  at 39w3d induction of labor     FHT reassuring    Active Hospital Problems    Diagnosis  POA    Indication for care in labor and delivery, antepartum [O75.9]  Yes      Resolved Hospital Problems   No resolved problems to display.   Labor course:  1215: 160/-3, cytotec first dose given   0415: 360/-3, start pit  0630: 60/-3, declined AROM, would like epidural first  0930: /-3, ballotable, will AROM at next check, pit rest due to tachysystole  1130: 70/-2, AROM clear, pit at 12      PLAN:    Continue Close Maternal/Fetal Monitoring  Pit at 12  Recheck 2 hours or PRN      Minerva Paul MD  Ob/Gyn PGY-3            "

## 2019-02-08 NOTE — ANESTHESIA PROCEDURE NOTES
Epidural    Patient location during procedure: OB   Reason for block: primary anesthetic   Diagnosis: IUP   Start time: 2/8/2019 8:51 AM  Timeout: 2/8/2019 8:50 AM  End time: 2/8/2019 9:20 AM  Staffing  Anesthesiologist: Bobby Fitzpatrick MD  Resident/CRNA: Kathleen Monterroso MD  Performed: anesthesiologist   Preanesthetic Checklist  Completed: patient identified, site marked, surgical consent, pre-op evaluation, timeout performed, IV checked, risks and benefits discussed, monitors and equipment checked, anesthesia consent given, hand hygiene performed and patient being monitored  Preparation  Patient position: sitting  Prep: ChloraPrep  Patient monitoring: Pulse Ox and Blood Pressure  Epidural  Skin Anesthetic: lidocaine 1%  Skin Wheal: 3 mL  Administration type: continuous  Approach: midline  Interspace: L3-4  Injection technique: SOHAIL saline  Needle and Epidural Catheter  Needle type: Tuohy   Needle gauge: 17  Needle length: 3.5 inches  Needle insertion depth: 6.5 cm  Catheter type: springwound and multi-orifice  Catheter size: 19 G  Catheter at skin depth: 11.5 cm  Test dose: 3 mL of lidocaine 1.5% with Epi 1-to-200,000  Additional Documentation: incremental injection, negative aspiration for heme and CSF, no paresthesia on injection, no significant complaints from patient and no significant pain on injection  Needle localization: anatomical landmarks  Assessment  Upper dermatomal levels - Left: T8  Right: T6   Dermatomal levels determined by alcohol wipe  Ease of block: easy  Patient's tolerance of the procedure: comfortable throughout block and no complaintsNo inadvertent dural puncture with Tuohy.  Dural puncture performed with spinal needle.

## 2019-02-08 NOTE — PROGRESS NOTES
"LABOR NOTE    S:  Complaints: No.  Epidural working:  not applicable  MD to bedside for routine cervical check    O: /66   Pulse 89   Temp 97.8 °F (36.6 °C) (Oral)   Resp 18   Ht 5' 4" (1.626 m)   Wt 84.4 kg (186 lb)   SpO2 100%   Breastfeeding? No   BMI 31.93 kg/m²       FHT: 135; moderate BTBV, +accels, - decels Cat 1 (reassuring)  CTX: q 2 minutes  SVE: 60/-2  Declines AROM      ASSESSMENT:   28 y.o.  at 39w3d induction of labor     FHT reassuring    Active Hospital Problems    Diagnosis  POA    Indication for care in labor and delivery, antepartum [O75.9]  Yes      Resolved Hospital Problems   No resolved problems to display.   Labor course:  1215: 60/-3, cytotec first dose given   0415: 3/60/-3, start pit  0630: 60/-2, declined AROM, would like epidural first      PLAN:    Continue Close Maternal/Fetal Monitoring  Cont Pitocin Augmentation per protocol  Recheck 2 hours or PRN    Minerva Paul MD  Ob/Gyn PGY-3      "

## 2019-02-08 NOTE — PROGRESS NOTES
"S: Maryan is a 28 y.o.  at 39w3d. She is doing well.     O:   Blood pressure 109/61, pulse 88, temperature 98.5 °F (36.9 °C), resp. rate 16, height 5' 4" (1.626 m), weight 84.4 kg (186 lb), SpO2 97 %, not currently breastfeeding.     FHT:Stable [id number], minimal variability with isolated episode of variable decelerations  [place]/IUPC: Irregular q 204  SVE:  / -2      ASSESSMENT: 39w3d   Active Hospital Problems    Diagnosis  POA    Indication for care in labor and delivery, antepartum [O75.9]  Yes      Resolved Hospital Problems   No resolved problems to display.       PLAN:  Dr. Naranjo in- tracing  Continue Close Maternal/Fetal Monitoring      Pat Louis CNM   "

## 2019-02-08 NOTE — PROGRESS NOTES
"LABOR NOTE    S:  Complaints: No.  Epidural working:  not applicable  MD to bedside for routine cervical check    O: /63   Pulse 89   Temp 97.6 °F (36.4 °C) (Temporal)   Ht 5' 4" (1.626 m)   Wt 84.4 kg (186 lb)   SpO2 98%   Breastfeeding? No   BMI 31.93 kg/m²       FHT: 130; moderate BTBV, +accels, - decels Cat 1 (reassuring)  CTX: q 3-5 minutes  SVE: 3/60/-3      ASSESSMENT:   28 y.o.  at 39w3d induction of labor     FHT reassuring    Active Hospital Problems    Diagnosis  POA    Indication for care in labor and delivery, antepartum [O75.9]  Yes      Resolved Hospital Problems   No resolved problems to display.   Labor course:  1215: /-3, cytotec first dose given   0415: 3/60/-3, start pit      PLAN:    Continue Close Maternal/Fetal Monitoring  Start Pitocin Augmentation per protocol  Recheck 2 hours or PRN    Abida Mulligan MD  OBGYN, PGY-1    "

## 2019-02-08 NOTE — L&D DELIVERY NOTE
[facility]  Vaginal Delivery   Operative Note    SUMMARY        cephalic after approximately 2 minutes of maternal pushing.  Under epidural anesthesia.  Infant delivered OA over an intact perineum.  No nuchals noted at the introitus introitus.  The male infant also tolerated the delivery well and was placed on mothers abdomen for skin to skin and bulb suctioning performed.  Cord clamped and cut.  Umbilical venous blood obtained.  Placenta delivered spontaneously and IV pitocin given.  Uterine tone noted. No cervical lacerations were identified.  A left labial laceration was repaired at bedside with 3-0 Vicryl in the standard fashion and found to be hemostatic after repair.  Patient tolerated delivery well.   cc  Staff present for entire procedure.  S/L/N counts correct x2.      Indications: <principal problem not specified>  Pregnancy complicated by:   Patient Active Problem List   Diagnosis    Pregnancy with one fetus in third trimester    Anemia during pregnancy in third trimester    Indication for care in labor and delivery, antepartum     (spontaneous vaginal delivery)     Admitting GA: 39w3d    Delivery Information for  Deepak Steinberg [place]lium    Birth information:  YOB: 2019   Time of birth: 3:47 PM   Sex: male   Head Delivery Date/Time: 2019  3:47 PM   Delivery type: Vaginal, Spontaneous   Gestational Age: 39w3d    Delivery Providers    Delivering clinician:  Joanna Salas DO   Provider Role    GIANFRANCO Figueroa MD Victoria F Crombie Pamela R Jeandron, RN             Measurements    Weight:    Length:           Apgars    Living status:  Living  Apgars:   1 min.:   5 min.:   10 min.:   15 min.:   20 min.:     Skin color:   1  1       Heart rate:   2  2       Reflex irritability:   2  2       Muscle tone:   2  2       Respiratory effort:   2  2       Total:   9  9       Apgars assigned by:  DAVON LEDEZMA RN         Operative  Delivery    Forceps attempted?:  No  Vacuum extractor attempted?:  No         Shoulder Dystocia    Shoulder dystocia present?:  No           Presentation    Presentation:  Vertex  Position:  Left Occiput Anterior           Interventions/Resuscitation    Method:  Bulb Suctioning, Tactile Stimulation       Cord    Vessels:  3 vessels  Complications:  None  Delayed Cord Clamping?:  No  Cord Blood Disposition:  Sent with Baby  Gases Sent?:  No  Stem Cell Collection (by MD):  No       Placenta    Placenta delivery date/time:  2019 1552  Placenta removal:  Spontaneous  Placenta appearance:  Intact  Placenta disposition:  discarded           Labor Events:       labor: No     Labor Onset Date/Time:         Dilation Complete Date/Time:         Start Pushing Date/Time: 2019 15:46     Rupture Date/Time:              Rupture type:           Fluid Amount:        Fluid Color:        Fluid Odor:        Membrane Status (PeriCalm): ARM (Artificial Rupture)      Rupture Date/Time (PeriCalm): 2019 11:51:00      Fluid Amount (PeriCalm): Moderate      Fluid Color (PeriCalm): Clear       steroids: None     Antibiotics given for GBS: No     Induction: misoprostol     Indications for induction:  Elective     Augmentation: amniotomy;oxytocin     Indications for augmentation:       Labor complications: None     Additional complications:          Cervical ripening:                     Delivery:      Episiotomy: None     Indication for Episiotomy:       Perineal Lacerations: None Repaired:      Periurethral Laceration: none Repaired:     Labial Laceration: left Repaired:     Sulcus Laceration: none Repaired:     Vaginal Laceration: No Repaired:     Cervical Laceration: No Repaired:     Repair suture:       Repair # of packets: 1     Vaginal delivery QBL (mL): 150      QBL (mL): 0     Combined Blood Loss (mL): 150     Vaginal Sweep Performed: Yes     Surgicount Correct: Yes       Other providers:        Anesthesia    Method:  Epidural          Details (if applicable):  Trial of Labor      Categorization:      Priority:     Indications for :     Incision Type:       Additional  information:  Forceps:    Vacuum:    Breech:    Observed anomalies    Other (Comments):

## 2019-02-08 NOTE — ANESTHESIA PREPROCEDURE EVALUATION
2019    Maryan Grady is a 28 y.o. female  at 38w1d presents after a fall at work around 1 pm. She states that she sat on a three legged table and it was unsteady. She fell onto her buttocks and back. She did not hit her belly or her head. She has not had any contractions, bleeding, leakage of fluid. Reports normal fetal movement. This pregnancy is complicated by history of anemia.    Denies any other significant PMH including asthma, HTN or bleeding/bruising disorders. Desires epidural when appropriate.    OB History    Para Term  AB Living   2 1 1     1   SAB TAB Ectopic Multiple Live Births           1      # Outcome Date GA Lbr Ruslan/2nd Weight Sex Delivery Anes PTL Lv   2 Current            1 Term 2013 40w3d  3.629 kg (8 lb) M Vag-Spont EPI  NELLY          Wt Readings from Last 1 Encounters:   19 2212 84.4 kg (186 lb)       BP Readings from Last 3 Encounters:   19 110/64   19 106/70   19 112/60       Patient Active Problem List   Diagnosis    Pregnancy with one fetus in third trimester    Anemia during pregnancy in third trimester       Past Surgical History:   Procedure Laterality Date    EYE SURGERY      strabismus       Social History     Socioeconomic History    Marital status: Single     Spouse name: Not on file    Number of children: Not on file    Years of education: Not on file    Highest education level: Not on file   Social Needs    Financial resource strain: Not on file    Food insecurity - worry: Not on file    Food insecurity - inability: Not on file    Transportation needs - medical: Not on file    Transportation needs - non-medical: Not on file   Occupational History    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 No    Breast-feeding Not Asked   Social History Narrative    Not on file         Chemistry    No results found for: NA, K, CL, CO2, BUN, CREATININE, GLU No results found for: CALCIUM, ALKPHOS, AST, ALT, BILITOT, ESTGFRAFRICA, EGFRNONAA         Lab Results   Component Value Date    WBC 6.83 01/17/2019    HGB 9.9 (L) 01/17/2019    HCT 30.0 (L) 01/17/2019    MCV 82 01/17/2019     01/17/2019       No results for input(s): PT, INR, PROTIME, APTT in the last 72 hours.    Anesthesia Evaluation    I have reviewed the Patient Summary Reports.    I have reviewed the Nursing Notes.   I have reviewed the Medications.     Review of Systems  Anesthesia Hx:  History of prior surgery of interest to airway management or planning: Denies Family Hx of Anesthesia complications.   Denies Personal Hx of Anesthesia complications.   Cardiovascular:  Cardiovascular Normal     Pulmonary:  Pulmonary Normal    Renal/:  Renal/ Normal     Hepatic/GI:  Hepatic/GI Normal    Musculoskeletal:  Musculoskeletal Normal    Neurological:  Neurology Normal    Endocrine:  Endocrine Normal        Physical Exam  General:  Well nourished    Airway/Jaw/Neck:  Airway Findings: Mouth Opening: Normal Tongue: Normal  Mallampati: III  Improves to II with phonation.  TM Distance: Normal, at least 6 cm         Dental:  DENTAL FINDINGS: Normal   Chest/Lungs:  Chest/Lungs Clear    Heart/Vascular:  Heart Findings: Normal       Mental Status:  Mental Status Findings:  Cooperative, Alert and Oriented         Anesthesia Plan  Type of Anesthesia, risks & benefits discussed:  Anesthesia Type:  CSE, epidural, general, spinal  Patient's Preference:   Intra-op Monitoring Plan: standard ASA monitors  Intra-op Monitoring Plan Comments:   Post Op Pain Control  Plan: per primary service following discharge from PACU, IV/PO Opioids PRN and multimodal analgesia  Post Op Pain Control Plan Comments:   Induction:   IV  Beta Blocker:  Patient is not currently on a Beta-Blocker (No further documentation required).       Informed Consent: Patient understands risks and agrees with Anesthesia plan.  Questions answered. Anesthesia consent signed with patient.  ASA Score: 2     Day of Surgery Review of History & Physical:    H&P update referred to the surgeon.         Ready For Surgery From Anesthesia Perspective.

## 2019-02-08 NOTE — PROGRESS NOTES
"LABOR NOTE    S:  Complaints: No.  Epidural working:  yes  MD to bedside for routine cervical check    O: /72   Pulse 109   Temp 97.9 °F (36.6 °C)   Resp 18   Ht 5' 4" (1.626 m)   Wt 84.4 kg (186 lb)   SpO2 99%   Breastfeeding? No   BMI 31.93 kg/m²       FHT: 145; moderate BTBV, +accels, variable decels, good recovery, good variability between  CTX: q1 min, tachysystole  SVE: /-3      ASSESSMENT:   28 y.o.  at 39w3d induction of labor     FHT reassuring    Active Hospital Problems    Diagnosis  POA    Indication for care in labor and delivery, antepartum [O75.9]  Yes      Resolved Hospital Problems   No resolved problems to display.   Labor course:  1215: /-3, cytotec first dose given   0415: 3/60/-3, start pit  0630: /-3, declined AROM, would like epidural first  0930: /-3, ballotable, will AROM at next check, pit rest due to tachysystole      PLAN:    Continue Close Maternal/Fetal Monitoring  Pit rest due to tachysystole, will restart in 30 mins  Recheck 2 hours or PRN      Susan Laboy MD   OBGYN, PGY-1        "

## 2019-02-08 NOTE — H&P
HISTORY AND PHYSICAL                                                OBSTETRICS          Subjective:       Maryan Grady is a 28 y.o.  female with IUP at 39w2d weeks gestation who presents for IOL.    Patient denies contractions, denies vaginal bleeding, denies LOF.   Fetal Movement: normal.    This IUP is complicated by anemia, GERD, PCN allergy (rash).    Review of Systems   Constitutional: Negative for chills and fever.   Eyes: Negative for visual disturbance.   Respiratory: Negative for shortness of breath.    Cardiovascular: Negative for chest pain and palpitations.   Gastrointestinal: Negative for abdominal pain, constipation, diarrhea, nausea and vomiting.   Genitourinary: Negative for vaginal bleeding and vaginal discharge.   Musculoskeletal: Negative for back pain.   Neurological: Negative for seizures, syncope and headaches.   Hematological: Does not bruise/bleed easily.   Psychiatric/Behavioral: Negative for depression. The patient is not nervous/anxious.        PMHx:   Past Medical History:   Diagnosis Date    GERD (gastroesophageal reflux disease)        PSHx:   Past Surgical History:   Procedure Laterality Date    EYE SURGERY      strabismus       All:   Review of patient's allergies indicates:   Allergen Reactions    Penicillins Rash       Meds:   Medications Prior to Admission   Medication Sig Dispense Refill Last Dose    prenat vit 17-iron-folic-om3,6 35-5-1.2-400 mg Cap Take by mouth.   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 No    Breast-feeding Not Asked   Social History Narrative    Not on file       FH:   Family History   Problem Relation Age of Onset    Melanoma Neg Hx     Breast cancer Neg Hx     Colon cancer Neg Hx     Ovarian cancer Neg Hx        OBHx:   Obstetric History       T1      L1     SAB0   TAB0   Ectopic0   Multiple0   Live Births1       # Outcome Date GA Lbr Ruslan/2nd Weight Sex Delivery Anes PTL Lv   2 Current            1 Term 2013 40w3d  3.629 kg (8 lb) M Vag-Spont EPI  NELLY      Name: Brock          Objective:       /67   Pulse 89   Temp 97.6 °F (36.4 °C) (Temporal)   Ht 5' 4" (1.626 m)   Wt 84.4 kg (186 lb)   SpO2 99%   Breastfeeding? No   BMI 31.93 kg/m²     Vitals:    19 2245 19 2322 19 2326 19 2327   BP: 100/62  116/67    Pulse: 92 91 96 89   Temp:       TempSrc:       SpO2:  99%  99%   Weight:       Height:           General:   alert, appears stated age and cooperative, no apparent distress   HENT:  normocephalic, atraumatic   Eyes:  extraocular movements normal   Neck:  range of motion normal   Lungs:   no respiratory distress   Heart:   regular rate   Abdomen:  soft, non-tender, non-distended but gravid, no rebound or guarding    Extremities negative edema, negative erythema   FHT: 130bpm, moderate BTBV, +accels, -decels;  Cat 1 (reassuring)                 TOCO: Q 15-20 minutes   Presentations: cephalic by ultrasound   Cervix:     Dilation: 1cm    Effacement: 60    Station:  -3    Consistency: medium   EFW by Leopold's: 7    Lab Review  Blood Type pending   GBBS: negative  Rubella: " Immune  RPR: NR  HIV: negative  HepB: negative       Assessment:       39w2d weeks gestation, IOL    Active Hospital Problems    Diagnosis  POA    Indication for care in labor and delivery, antepartum [O75.9]  Yes      Resolved Hospital Problems   No resolved problems to display.          Plan:      Risks, benefits, alternatives and possible complications have been discussed in detail with the patient.   - Consents signed and to chart  - Admit to Labor and Delivery unit  - Cytotec PO  - Epidural per Anesthesia  - Draw CBC, T&S  - Notify Staff  - Recheck in 4 hrs or PRN    Post-Partum Hemorrhage risk - low    Abida Mulligan MD  OBGYN, PGY-1

## 2019-02-09 LAB
BASOPHILS # BLD AUTO: 0.02 K/UL
BASOPHILS NFR BLD: 0.2 %
DIFFERENTIAL METHOD: ABNORMAL
EOSINOPHIL # BLD AUTO: 0.1 K/UL
EOSINOPHIL NFR BLD: 0.8 %
ERYTHROCYTE [DISTWIDTH] IN BLOOD BY AUTOMATED COUNT: 15.1 %
HCT VFR BLD AUTO: 28.7 %
HGB BLD-MCNC: 9.4 G/DL
LYMPHOCYTES # BLD AUTO: 2.1 K/UL
LYMPHOCYTES NFR BLD: 20 %
MCH RBC QN AUTO: 26.7 PG
MCHC RBC AUTO-ENTMCNC: 32.8 G/DL
MCV RBC AUTO: 82 FL
MONOCYTES # BLD AUTO: 1 K/UL
MONOCYTES NFR BLD: 9.3 %
NEUTROPHILS # BLD AUTO: 7.3 K/UL
NEUTROPHILS NFR BLD: 69 %
PLATELET # BLD AUTO: 194 K/UL
PMV BLD AUTO: 11.2 FL
RBC # BLD AUTO: 3.52 M/UL
WBC # BLD AUTO: 10.62 K/UL

## 2019-02-09 PROCEDURE — 11000001 HC ACUTE MED/SURG PRIVATE ROOM

## 2019-02-09 PROCEDURE — 36415 COLL VENOUS BLD VENIPUNCTURE: CPT

## 2019-02-09 PROCEDURE — 99223 1ST HOSP IP/OBS HIGH 75: CPT | Mod: ,,, | Performed by: OBSTETRICS & GYNECOLOGY

## 2019-02-09 PROCEDURE — 25000003 PHARM REV CODE 250: Performed by: STUDENT IN AN ORGANIZED HEALTH CARE EDUCATION/TRAINING PROGRAM

## 2019-02-09 PROCEDURE — 85025 COMPLETE CBC W/AUTO DIFF WBC: CPT

## 2019-02-09 PROCEDURE — 99223 PR INITIAL HOSPITAL CARE,LEVL III: ICD-10-PCS | Mod: ,,, | Performed by: OBSTETRICS & GYNECOLOGY

## 2019-02-09 RX ORDER — IBUPROFEN 600 MG/1
600 TABLET ORAL EVERY 6 HOURS
Qty: 30 TABLET | Refills: 0 | Status: SHIPPED | OUTPATIENT
Start: 2019-02-09 | End: 2020-07-15

## 2019-02-09 RX ADMIN — IBUPROFEN 600 MG: 600 TABLET ORAL at 06:02

## 2019-02-09 RX ADMIN — HYDROCODONE BITARTRATE AND ACETAMINOPHEN 1 TABLET: 5; 325 TABLET ORAL at 05:02

## 2019-02-09 RX ADMIN — IBUPROFEN 600 MG: 600 TABLET ORAL at 11:02

## 2019-02-09 RX ADMIN — IBUPROFEN 600 MG: 600 TABLET ORAL at 12:02

## 2019-02-09 RX ADMIN — IBUPROFEN 600 MG: 600 TABLET ORAL at 05:02

## 2019-02-09 RX ADMIN — HYDROCODONE BITARTRATE AND ACETAMINOPHEN 1 TABLET: 5; 325 TABLET ORAL at 08:02

## 2019-02-09 NOTE — LACTATION NOTE
02/09/19 1040   Maternal Assessment   Breast Density Bilateral:;soft   Areola Bilateral:;elastic   Nipples Bilateral:;everted   Maternal Infant Feeding   Maternal Emotional State assist needed;relaxed   Infant Positioning cross-cradle   Signs of Milk Transfer audible swallow;infant jaw motion present  (with compression)   Comfort Measures Before/During Feeding infant position adjusted;latch adjusted;maternal position adjusted   Latch Assistance yes   lactation rounds. Basic education reviewed. Latch assistance provided. Lc assisted pt with deepening latch, cross cradle position . Baby nursing well, with use of breast compression and stimulation.

## 2019-02-09 NOTE — PROGRESS NOTES
POSTPARTUM PROGRESS NOTE     Maryan Grady is a 28 y.o. female PPD #1 status post Spontaneous vaginal delivery at 39w3d in a pregnancy complicated by anemia. Patient is doing well this morning. She denies nausea, vomiting, fever or chills. Patient reports mild abdominal pain that is adequately relieved by oral pain medications. Lochia is moderate and decreasing. Patient is voiding without difficulty and ambulating with no difficulty. She has not passed flatus, and has not had BM. Patient does plan to breast feed. Will discuss contraception with Dr. Salas. She desires circumcision.     Objective:       Temp:  [97 °F (36.1 °C)-98.5 °F (36.9 °C)] 98 °F (36.7 °C)  Pulse:  [] 78  Resp:  [16-18] 16  SpO2:  [89 %-100 %] 100 %  BP: ()/(51-78) 101/66    General:   alert, appears stated age and cooperative   Lungs:   clear to auscultation bilaterally   Heart:   regular rate and rhythm, S1, S2 normal, no murmur, click, rub or gallop   Abdomen:  soft, non-tender; bowel sounds normal; no masses,  no organomegaly   Uterus:  firm located at the umblicus.    Extremities: peripheral pulses normal, no pedal edema, no clubbing or cyanosis     Lab Review  Recent Results (from the past 4 hour(s))   CBC auto differential    Collection Time: 02/09/19  5:04 AM   Result Value Ref Range    WBC 10.62 3.90 - 12.70 K/uL    RBC 3.52 (L) 4.00 - 5.40 M/uL    Hemoglobin 9.4 (L) 12.0 - 16.0 g/dL    Hematocrit 28.7 (L) 37.0 - 48.5 %    MCV 82 82 - 98 fL    MCH 26.7 (L) 27.0 - 31.0 pg    MCHC 32.8 32.0 - 36.0 g/dL    RDW 15.1 (H) 11.5 - 14.5 %    Platelets 194 150 - 350 K/uL    MPV 11.2 9.2 - 12.9 fL    Gran # (ANC) 7.3 1.8 - 7.7 K/uL    Lymph # 2.1 1.0 - 4.8 K/uL    Mono # 1.0 0.3 - 1.0 K/uL    Eos # 0.1 0.0 - 0.5 K/uL    Baso # 0.02 0.00 - 0.20 K/uL    Gran% 69.0 38.0 - 73.0 %    Lymph% 20.0 18.0 - 48.0 %    Mono% 9.3 4.0 - 15.0 %    Eosinophil% 0.8 0.0 - 8.0 %    Basophil% 0.2 0.0 - 1.9 %    Differential Method Automated         I/O    Intake/Output Summary (Last 24 hours) at 2019 0629  Last data filed at 2019 2130  Gross per 24 hour   Intake --   Output 3000 ml   Net -3000 ml        Assessment:     Patient Active Problem List   Diagnosis    Pregnancy with one fetus in third trimester    Anemia during pregnancy in third trimester    Indication for care in labor and delivery, antepartum     (spontaneous vaginal delivery)        Plan:   1. Postpartum care:  - Patient doing well. Continue routine management and advances.  - Continue PO pain meds. Pain well controlled.  - Encourage ambulation  - Circumcision desired, consents signed and to chart  - Contraception per Dr. Salas  - Lactation consult PRN    2. Chronic anemia:  - Heme: H/h 10/30.6>p      Dispo: As patient meets milestones, will plan to discharge PPD#1-2.      Minerva Paul MD  Ob/Gyn PGY-3    Doing well, no questions,no problems.  I have reviewed the resident's note, evaluated the patient and agree with the diagnosis and management plan.

## 2019-02-09 NOTE — ANESTHESIA POSTPROCEDURE EVALUATION
"Anesthesia Post Evaluation    Patient: Maryan Totkarlium    Procedure(s) Performed: * No procedures listed *    Final Anesthesia Type: epidural  Patient location during evaluation: labor & delivery  Patient participation: Yes- Able to Participate  Level of consciousness: awake and alert and oriented  Post-procedure vital signs: reviewed and stable  Pain management: adequate  Airway patency: patent  PONV status at discharge: No PONV  Anesthetic complications: no      Cardiovascular status: blood pressure returned to baseline  Respiratory status: unassisted and spontaneous ventilation  Hydration status: euvolemic  Follow-up not needed.        Visit Vitals  /76   Pulse 81   Temp 36.4 °C (97.6 °F) (Oral)   Resp 18   Ht 5' 4" (1.626 m)   Wt 84.4 kg (186 lb)   SpO2 99%   Breastfeeding? Unknown   BMI 31.93 kg/m²       Pain/Yesenia Score: Pain Rating Prior to Med Admin: 0 (2/9/2019 11:44 AM)  Pain Rating Post Med Admin: 0 (2/9/2019  1:43 AM)        "

## 2019-02-09 NOTE — DISCHARGE INSTRUCTIONS
Breastfeeding Discharge Instructions       Feed the baby at the earliest sign of hunger or comfort  o Hands to mouth, sucking motions  o Rooting or searching for something to suck on  o Dont wait for crying - it is a sign of distress     The feedings may be 8-12 times per 24hrs and will not follow a schedule   Avoid pacifiers and bottles for the first 4 weeks   Alternate the breast you start the feeding with, or start with the breast that feels the fullest   Switch breasts when the baby takes himself off the breast or falls asleep   Keep offering breasts until the baby looks full, no longer gives hunger signs, and stays asleep when placed on his back in the crib   If the baby is sleepy and wont wake for a feeding, put the baby skin-to-skin dressed in a diaper against the mothers bare chest   Sleep near your baby   The baby should be positioned and latched on to the breast correctly  o Chest-to-chest, chin in the breast  o Babys lips are flipped outward  o Babys mouth is stretched open wide like a shout  o Babys sucking should feel like tugging to the mother  - The baby should be drinking at the breast:  o You should hear swallowing or gulping throughout the feeding  o You should see milk on the babys lips when he comes off the breast  o Your breasts should be softer when the baby is finished feeding  o The baby should look relaxed at the end of feedings  o After the 4th day and your milk is in:  o The babys poop should turn bright yellow and be loose, watery, and seedy  o The baby should have at least 3-4 poops the size of the palm of your hand per day  o The baby should have at least 5-6 wet diapers per day  o The urine should be light yellow in color  You should drink when you are thirsty and eat a healthy diet when you are    hungry.     Take naps to get the rest you need.   Take medications and/or drink alcohol only with permission of your obstetrician    or the babys pediatrician.  You can  also call the [facility],   ([phone]), Monday-Friday, 8am-5pm Central time, to get the most   up-to-date evidence-based information on the use of medications during   pregnancy and breastfeeding.      The baby should be examined by a pediatrician at 3-5 days of age.  Once your   milk comes in, the baby should be gaining at least ½ - 1oz each day and should be back to birthweight no later than 10-14 days of age.          Community Resources    [facility] Breastfeeding Warmline: [phone]   Local [facility] clinics: provide incentives and breastpumps to eligible mothers  [employer] ([employer]):  mother-to-mother support group website        www.Beablool.Gr8erMinds  Local La Leche League mother-to-mother support groups:        www.Mobilygen        La Leche League [facility]   Dr. Tyron Vasquez website for latch videos and general information:        www.breastfeedinginc.ca  [facility] is a call center that provides information about the safety of taking medications while breastfeeding.  Call 1-824.857.9511, M-F, 8am-5pm, CT.  International Lactation Consultant Association provides resources for assistance:        www.ilca.org  [place] Breastfeeding [employer] provides informationand resources for parents  and the community    www.LaBreastfeedingSupport.org     Cami Maynard is a mom-to-mom support group:                             www."[employer]camilaSparkroad.com//breastfeedng-support/  [employer] for Healthy Babies:  8-068-224-BABY(6257)  [employer]: a breastfeeding support group for women of color, [phone]

## 2019-02-10 VITALS
WEIGHT: 186 LBS | BODY MASS INDEX: 31.76 KG/M2 | DIASTOLIC BLOOD PRESSURE: 62 MMHG | TEMPERATURE: 99 F | HEIGHT: 64 IN | HEART RATE: 73 BPM | SYSTOLIC BLOOD PRESSURE: 109 MMHG | OXYGEN SATURATION: 98 % | RESPIRATION RATE: 18 BRPM

## 2019-02-10 PROCEDURE — 25000003 PHARM REV CODE 250: Performed by: STUDENT IN AN ORGANIZED HEALTH CARE EDUCATION/TRAINING PROGRAM

## 2019-02-10 RX ADMIN — IBUPROFEN 600 MG: 600 TABLET ORAL at 11:02

## 2019-02-10 RX ADMIN — DOCUSATE SODIUM 200 MG: 100 CAPSULE, LIQUID FILLED ORAL at 01:02

## 2019-02-10 RX ADMIN — IBUPROFEN 600 MG: 600 TABLET ORAL at 05:02

## 2019-02-10 NOTE — PROGRESS NOTES
POSTPARTUM PROGRESS NOTE     Maryan Grady is a 28 y.o. female PPD #2 status post Spontaneous vaginal delivery at 39w3d in a pregnancy complicated by anemia. Patient is doing well this morning. She denies nausea, vomiting, fever or chills. Patient reports mild abdominal pain that is adequately relieved by oral pain medications. Lochia is moderate and decreasing. Patient is voiding without difficulty and ambulating with no difficulty. She has not passed flatus, and has not had BM. Patient does plan to breast feed. Will discuss contraception with Dr. Salas. She desires circumcision.     Objective:       Temp:  [97.6 °F (36.4 °C)-97.9 °F (36.6 °C)] 97.9 °F (36.6 °C)  Pulse:  [78-81] 78  Resp:  [18] 18  SpO2:  [99 %] 99 %  BP: (108-111)/(67-76) 108/67    General:   alert, appears stated age and cooperative   Lungs:   clear to auscultation bilaterally   Heart:   regular rate and rhythm, S1, S2 normal, no murmur, click, rub or gallop   Abdomen:  soft, non-tender; bowel sounds normal; no masses,  no organomegaly   Uterus:  firm located at the umblicus.    Extremities: peripheral pulses normal, no pedal edema, no clubbing or cyanosis     Lab Review  No results found for this or any previous visit (from the past 4 hour(s)).    I/O  No intake or output data in the 24 hours ending 02/10/19 0025     Assessment:     Patient Active Problem List   Diagnosis    Pregnancy with one fetus in third trimester    Anemia during pregnancy in third trimester    Indication for care in labor and delivery, antepartum     (spontaneous vaginal delivery)        Plan:   1. Postpartum care:  - Patient doing well. Continue routine management and advances.  - Continue PO pain meds. Pain well controlled.  - Encourage ambulation  - Circumcision desired, consents signed and to chart  - Contraception per Dr. Salas  - Lactation consult PRN    2. Chronic anemia:  - Heme: H/h 10/30.6>    Dispo: As patient meets milestones, will plan to discharge  PPD#1-2.      Rosario COLLIER  PGY2    Doing well, no questions,no problems.  I have reviewed the resident's note, evaluated the patient and agree with the diagnosis and management plan

## 2019-02-10 NOTE — PLAN OF CARE
Problem: Breastfeeding  Goal: Effective Breastfeeding  Outcome: Outcome(s) achieved Date Met: 02/10/19  Mother to breastfeed infant 8 or more times in 24hrs on infant's cue until content, frequent skin to skin, and will avoid bottles and pacifiers.  Mother is to keep infant actively feeding by keeping infant stimulated and using breast compression. Mother ensure effective nursing by hearing infant swallows and feeling nice tugs and pulls. Latch should not be painful while nursing.  Mother to record all breastfeedings, voids and stools in breastfeeding guide. Mother to call  for breastfeeding assistance or questions .  Refer breastfeeding guide for lactation education.

## 2019-02-10 NOTE — PLAN OF CARE
Problem: Adult Inpatient Plan of Care  Goal: Plan of Care Review  Plan of care reviewed with mother, understanding verbalized. VSS;afebrile. Minimal complaints of pain, controlled with scheduled Motrin. Up and ambulating independently. Passing gas. Mother and infant bonding well.

## 2019-02-10 NOTE — DISCHARGE SUMMARY
Delivery Discharge Summary  Obstetrics      Primary OB Clinician: Band    Admission date: 2019  Discharge date: 02/10/2019    Disposition: To home, self care    Admit Dx:      Patient Active Problem List   Diagnosis    Pregnancy with one fetus in third trimester    Anemia during pregnancy in third trimester    Indication for care in labor and delivery, antepartum     (spontaneous vaginal delivery)     Discharge Dx:    Patient Active Problem List   Diagnosis    Pregnancy with one fetus in third trimester    Anemia during pregnancy in third trimester    Indication for care in labor and delivery, antepartum     (spontaneous vaginal delivery)       Procedure:   Hospital Course:  Maryan Grady is a 28 y.o. now , PPD #2 who was admitted on 2019 at 39w9 for IOL . On initial assessment, vital signs were stable and physical exam was normal. Infant was in cephalic presentation. Patient was subsequently admitted to labor and delivery unit with signed consents. Labor course was managed with pitocin. Please see delivery note for further details. Pt was in stable condition post delivery and was transferred to the Mother-Baby Unit. Her postpartum course was uncomplicated. On discharge day, patient's pain is controlled with oral pain medications. Pt is tolerating ambulation without SOB or CP, and PO diet without N/V. Reports lochia is mild. Denies any HA, vision changes, F/C, LE swelling. Denies any breast pain/soreness.  Pt in stable condition and ready for discharge. She has been instructed to continue pain medications as needed and to follow up in the OB clinic in 6 weeks with her obstetrics provider.    Pertinent studies:  Postpartum CBC  Lab Results   Component Value Date    WBC 10.62 2019    HGB 9.4 (L) 2019    HCT 28.7 (L) 2019    MCV 82 2019     2019         Tubal Ligation: n/a  Feeding Method: both breast and bottle  Rh Immune Globulin Given(O POS):  N/A  Rubella Vaccine Given: N/A  Tdap Vaccine Given: N/A    Delivery:    Episiotomy: None   Lacerations: None   Repair suture:     Repair # of packets: 1   Blood loss (ml): 150     Birth information:  YOB: 2019   Time of birth: 3:47 PM   Sex: male   Delivery type: Vaginal, Spontaneous   Gestational Age: 39w3d    Delivery Clinician:      Other providers:       Additional  information:  Forceps:    Vacuum:    Breech:    Observed anomalies      Living?:           APGARS  One minute Five minutes Ten minutes   Skin color:         Heart rate:         Grimace:         Muscle tone:         Breathing:         Totals: 9  9        Placenta: Delivered:       appearance      Patient Instructions:   Current Discharge Medication List      START taking these medications    Details   ibuprofen (ADVIL,MOTRIN) 600 MG tablet Take 1 tablet (600 mg total) by mouth every 6 (six) hours.  Qty: 30 tablet, Refills: 0         CONTINUE these medications which have NOT CHANGED    Details   prenat vit 17-iron-folic-om3,6 35-5-1.2-400 mg Cap Take by mouth.             Discharge Procedure Orders   Other restrictions (specify):   Order Comments: Nothing in vagina till seen by MD     Notify your health care provider if you experience any of the following:  temperature >100.4     Notify your health care provider if you experience any of the following:  persistent nausea and vomiting or diarrhea     Notify your health care provider if you experience any of the following:  severe uncontrolled pain     Notify your health care provider if you experience any of the following:  redness, tenderness, or signs of infection (pain, swelling, redness, odor or green/yellow discharge around incision site)     Notify your health care provider if you experience any of the following:  difficulty breathing or increased cough     Notify your health care provider if you experience any of the following:  severe persistent headache     Notify your health care  provider if you experience any of the following:  worsening rash     Notify your health care provider if you experience any of the following:  persistent dizziness, light-headedness, or visual disturbances     Notify your health care provider if you experience any of the following:  increased confusion or weakness     Notify your health care provider if you experience any of the following:   Order Comments: Vaginal bleeding through 2 pads/hr       Rosario Saavedra M.D.  OBGYN  PGY2    Doing well, no complaints, ready for D/C.

## 2019-02-10 NOTE — LACTATION NOTE
02/10/19 0930   Maternal Assessment   Breast Density Bilateral:;filling   Areola Bilateral:;elastic   Nipples Bilateral:;everted   Left Nipple Symptoms tender   Right Nipple Symptoms tender   Maternal Infant Feeding   Maternal Emotional State assist needed;relaxed   Infant Positioning clutch/football   Signs of Milk Transfer audible swallow;infant jaw motion present   Pain with Feeding no   Comfort Measures Before/During Feeding infant position adjusted;latch adjusted;suction broken using finger;maternal position adjusted   Latch Assistance yes   Lactation Referrals   Lactation Referrals other (see comments)  (lactation warmline)   Discharge lactation education with breastfeeding guide. Assisted mother with position and latch, minimal assistance, infant nurses well with compression and stimulation to keep active. Mother using hydrogels for nipple tenderness, educated on use and when to discard. Taught hand expression. LC number on board, Mother has lactation warmline number and additional resources for after discharge.

## 2019-02-10 NOTE — PLAN OF CARE
Problem: Adult Inpatient Plan of Care  Goal: Plan of Care Review  Outcome: Outcome(s) achieved Date Met: 02/10/19  Pt ambulating, voiding, and passing flatus. Pt tolerating PO well and there is no SS of distress at this time. Pt's pain well controlled throughout shift by oral pain medication. Pt's bleeding has been light throughout shift and fundus is firm. VSS.   Pt. Discharged via wheelchair with baby in arms by escort. Pt. Verbalized understanding about making her  6 week postpartum appointment.

## 2019-02-12 ENCOUNTER — NURSE TRIAGE (OUTPATIENT)
Dept: ADMINISTRATIVE | Facility: CLINIC | Age: 29
End: 2019-02-12

## 2019-02-13 NOTE — TELEPHONE ENCOUNTER
"    Reason for Disposition   Normal postpartum bleeding (all triage questions negative)    Answer Assessment - Initial Assessment Questions  1. ONSET: "Describe your bleeding: is it getting worse, staying the same, improving, or stopping and starting?"      Has not had much bleeding- only using about 1 pad/day- just passed a golf ball size clot   2. AMOUNT: "How much bleeding are you having today?"       - Slight: spotting, or pinkish / brownish mucous discharge; used less than one pad total     - MILD - less than one pad per hour; similar to menstrual bleeding     - MODERATE - blood clots; 1-2 pads/hour     - SEVERE: continuous red blood from vagina; large blood clots (e.g., golf ball size); > 2 pads/hour or not contained by pads      As above  3. ABDOMINAL PAIN: "Do you have any pain?" "How bad is the pain?" "What does it keep you from doing?"      - MILD -  doesn't interfere with normal activities, abdomen soft and not tender to touch      - MODERATE - interferes with normal activities or awakens from sleep, tender to touch      - SEVERE - excruciating pain, doubled over, unable to do any normal activities        Mild cramping when breast feeding  4. HORMONES: "Are you taking any birth control medications?" (e.g., birth control pills, Depo-Provera)        5. BLOOD THINNERS: "Do you take any blood thinners?" (e.g., coumadin, aspirin)      Ibuprofen for pain   6. OTHER SYMPTOMS: "Do you have any other symptoms?" (e.g., fever, chills, dizziness)      Had a little dizziness this am  7. DELIVERY DATE: "When was your delivery date?" "Vaginal delivery or ?"      Vaginal - 19  8. BREASTFEEDING: "Are you breastfeeding?"      yes    Protocols used: ST POSTPARTUM - VAGINAL BLEEDING AND LOCHIA-A-AH      "

## 2019-02-21 ENCOUNTER — TELEPHONE (OUTPATIENT)
Dept: LACTATION | Facility: CLINIC | Age: 29
End: 2019-02-21

## 2019-02-22 ENCOUNTER — TELEPHONE (OUTPATIENT)
Dept: LACTATION | Facility: CLINIC | Age: 29
End: 2019-02-22

## 2019-02-23 NOTE — TELEPHONE ENCOUNTER
Pt continues with plan. Pt has no questions a this time. Pt will call lactation warmline prn. Baby to have weight check on Monday.

## 2019-02-26 ENCOUNTER — POSTPARTUM VISIT (OUTPATIENT)
Dept: OBSTETRICS AND GYNECOLOGY | Facility: CLINIC | Age: 29
End: 2019-02-26
Attending: OBSTETRICS & GYNECOLOGY
Payer: COMMERCIAL

## 2019-02-26 VITALS — HEIGHT: 64 IN | WEIGHT: 171.5 LBS | BODY MASS INDEX: 29.28 KG/M2

## 2019-02-26 PROCEDURE — 0503F POSTPARTUM CARE VISIT: CPT | Mod: S$GLB,,, | Performed by: OBSTETRICS & GYNECOLOGY

## 2019-02-26 PROCEDURE — 0503F PR POSTPARTUM CARE VISIT: ICD-10-PCS | Mod: S$GLB,,, | Performed by: OBSTETRICS & GYNECOLOGY

## 2019-02-26 PROCEDURE — 3008F BODY MASS INDEX DOCD: CPT | Mod: CPTII,S$GLB,, | Performed by: OBSTETRICS & GYNECOLOGY

## 2019-02-26 PROCEDURE — 3008F PR BODY MASS INDEX (BMI) DOCUMENTED: ICD-10-PCS | Mod: CPTII,S$GLB,, | Performed by: OBSTETRICS & GYNECOLOGY

## 2019-02-26 RX ORDER — SERTRALINE HYDROCHLORIDE 25 MG/1
25 TABLET, FILM COATED ORAL DAILY
Qty: 30 TABLET | Refills: 2 | Status: SHIPPED | OUTPATIENT
Start: 2019-02-26 | End: 2020-11-16

## 2019-02-26 NOTE — PROGRESS NOTES
"CC: Post-partum follow-up    HPI:  Maryan Grady is a 28 y.o. female  presents for post-partum visit s/p a .  Presents for eval for PPD and check in of emotional health post delivery.  Reports that she is starting to feel some levels of anzxiety, some poor sleep, states she is interested in possible counselor or medications.    Delivery Date: 2019  Delivery MD: Maritza  Gender: male  Birth Weight: 6 pounds 14 ounces  Breast Feeding: YES  Depression: NO      ROS:  GENERAL: No fever, chills, fatigability or weight loss.  VULVAR: No pain, no lesions and no itching.  VAGINAL: No relaxation, no itching, no discharge, no abnormal bleeding and no lesions.  ABDOMEN: No abdominal pain. Denies nausea. Denies vomiting. No diarrhea. No constipation  BREAST: Denies pain. No lumps. No discharge.  URINARY: No incontinence, no nocturia, no frequency and no dysuria.  CARDIOVASCULAR: No chest pain. No shortness of breath. No leg cramps.  NEUROLOGICAL: No headaches. No vision changes.    PHYSICAL EXAM:  Ht 5' 4" (1.626 m)   Wt 77.8 kg (171 lb 8.3 oz)   Breastfeeding? Yes   BMI 29.44 kg/m²   GEN: AAO x 3, NAD  PSYCH: normal affect, tearful, anxious. Appropriate behavior, asking for help as needed, good insight, working on sleep      Diagnosis:  1. Postpartum depression        Plan:   Plan to start low dose SSRI, also given counseling resources  Discussed increasing meds as needed  Discussed importance of sleeping when baby sleeps, daily routines  Discussed asking partner for help and getting better at asking him to check in.  Warning signs discussed.  F/U in 2 weeks or PRN  Orders Placed This Encounter    sertraline (ZOLOFT) 25 MG tablet         "

## 2019-03-29 ENCOUNTER — POSTPARTUM VISIT (OUTPATIENT)
Dept: OBSTETRICS AND GYNECOLOGY | Facility: CLINIC | Age: 29
End: 2019-03-29
Attending: OBSTETRICS & GYNECOLOGY
Payer: COMMERCIAL

## 2019-03-29 VITALS
BODY MASS INDEX: 29.7 KG/M2 | WEIGHT: 173.94 LBS | SYSTOLIC BLOOD PRESSURE: 114 MMHG | DIASTOLIC BLOOD PRESSURE: 62 MMHG | HEIGHT: 64 IN

## 2019-03-29 DIAGNOSIS — R30.0 DYSURIA: Primary | ICD-10-CM

## 2019-03-29 PROBLEM — O99.013 ANEMIA DURING PREGNANCY IN THIRD TRIMESTER: Status: RESOLVED | Noted: 2019-01-04 | Resolved: 2019-03-29

## 2019-03-29 PROBLEM — Z34.93 PREGNANCY WITH ONE FETUS IN THIRD TRIMESTER: Status: RESOLVED | Noted: 2018-11-30 | Resolved: 2019-03-29

## 2019-03-29 PROCEDURE — 99999 PR PBB SHADOW E&M-EST. PATIENT-LVL III: CPT | Mod: PBBFAC,,, | Performed by: OBSTETRICS & GYNECOLOGY

## 2019-03-29 PROCEDURE — 0503F PR POSTPARTUM CARE VISIT: ICD-10-PCS | Mod: S$GLB,,, | Performed by: OBSTETRICS & GYNECOLOGY

## 2019-03-29 PROCEDURE — 0503F POSTPARTUM CARE VISIT: CPT | Mod: S$GLB,,, | Performed by: OBSTETRICS & GYNECOLOGY

## 2019-03-29 PROCEDURE — 99999 PR PBB SHADOW E&M-EST. PATIENT-LVL III: ICD-10-PCS | Mod: PBBFAC,,, | Performed by: OBSTETRICS & GYNECOLOGY

## 2019-03-29 RX ORDER — ACETAMINOPHEN AND CODEINE PHOSPHATE 120; 12 MG/5ML; MG/5ML
1 SOLUTION ORAL DAILY
Qty: 90 TABLET | Refills: 3 | Status: SHIPPED | OUTPATIENT
Start: 2019-03-29 | End: 2020-07-16

## 2019-03-29 NOTE — PROGRESS NOTES
"CC: Post-partum follow-up    HPI:  Maryan Grady is a 28 y.o. female  presents for post-partum visit s/p a .  Reports some burning with urination, urine cultures sent    Delivery Date: 2019  Delivery MD: Maritza  Gender: male  Birth Weight: 6 pounds 14 ounces  Breast Feeding: YES  Depression: NO  Contraception: oral progesterone-only contraceptive      ROS:  GENERAL: No fever, chills, fatigability or weight loss.  VULVAR: No pain, no lesions and no itching.  VAGINAL: No relaxation, no itching, no discharge, no abnormal bleeding and no lesions.  ABDOMEN: No abdominal pain. Denies nausea. Denies vomiting. No diarrhea. No constipation  BREAST: Denies pain. No lumps. No discharge.  URINARY: No incontinence, no nocturia, no frequency and no dysuria.  CARDIOVASCULAR: No chest pain. No shortness of breath. No leg cramps.  NEUROLOGICAL: No headaches. No vision changes.    PHYSICAL EXAM:  /62   Ht 5' 4" (1.626 m)   Wt 78.9 kg (173 lb 15.1 oz)   Breastfeeding? Yes   BMI 29.86 kg/m²   PELVIC: exam chaperoned by nurse, normal vagina and vulva, normal cervix without lesions, polyps or tenderness, multiparous os, uterus normal size, shape, consistency, no mass or tenderness    PROCEDURES:  - Pap smear        Diagnosis:  1. Dysuria    2. Routine postpartum follow-up    3.  (spontaneous vaginal delivery)        Plan:     Orders Placed This Encounter    Urine culture    norethindrone (ORTHO MICRONOR) 0.35 mg tablet         Patient was counseled today on A.C.S. Pap guidelines and recommendations for yearly pelvic exams and monthly self breast exams; to see her PCP for other health maintenance.    "

## 2019-04-03 ENCOUNTER — TELEPHONE (OUTPATIENT)
Dept: OBSTETRICS AND GYNECOLOGY | Facility: CLINIC | Age: 29
End: 2019-04-03

## 2019-04-03 NOTE — TELEPHONE ENCOUNTER
----- Message from Zena Argueta sent at 4/3/2019  9:17 AM CDT -----  Contact: self  Patient is calling regarding a urine test that was supposed to be ran because of the pain she is having from the catheter. Patient was calling to see if the test was performed and if there are any result in. Patient can be reached at [phone].        Left message for patient to give the office a call back.

## 2019-04-03 NOTE — TELEPHONE ENCOUNTER
----- Message from Pierre Bo sent at 4/3/2019  2:57 PM CDT -----  Pt returning your call [phone] you can leave a message

## 2019-04-04 ENCOUNTER — TELEPHONE (OUTPATIENT)
Dept: OBSTETRICS AND GYNECOLOGY | Facility: CLINIC | Age: 29
End: 2019-04-04

## 2019-04-04 DIAGNOSIS — R30.0 DYSURIA: Primary | ICD-10-CM

## 2019-04-04 NOTE — TELEPHONE ENCOUNTER
----- Message from Jacobo Naranjo sent at 4/4/2019  3:16 PM CDT -----  Contact: EDIN VIRAMONTES [[id number]]  Type:  Patient Returning Call    Who Called: EDIN VIRAMONTES [[id number]]    Who Left Message for Patient: Deb Strange MA    Does the patient know what this is regarding?: Yes (urine test results)    Best Call Back Number:[phone]    Additional Information:  Pt requested a call back. Please advise. Thanks            Returned patient call, patient states she was calling for results. Inform patient she will need to come into the lab and give a urine sample. Patient states she will come in tomorrow.

## 2019-04-05 ENCOUNTER — LAB VISIT (OUTPATIENT)
Dept: LAB | Facility: OTHER | Age: 29
End: 2019-04-05
Attending: OBSTETRICS & GYNECOLOGY
Payer: COMMERCIAL

## 2019-04-05 DIAGNOSIS — R30.0 DYSURIA: ICD-10-CM

## 2019-04-05 PROCEDURE — 87086 URINE CULTURE/COLONY COUNT: CPT

## 2019-04-06 LAB
BACTERIA UR CULT: NORMAL
BACTERIA UR CULT: NORMAL

## 2019-04-10 ENCOUNTER — PATIENT MESSAGE (OUTPATIENT)
Dept: OBSTETRICS AND GYNECOLOGY | Facility: CLINIC | Age: 29
End: 2019-04-10

## 2019-05-01 ENCOUNTER — TELEPHONE (OUTPATIENT)
Dept: OBSTETRICS AND GYNECOLOGY | Facility: CLINIC | Age: 29
End: 2019-05-01

## 2019-05-01 NOTE — TELEPHONE ENCOUNTER
----- Message from Skyeivis Simone sent at 5/1/2019 11:42 AM CDT -----  Contact: EDIN VIRAMONTES [[id number]]  Name of Who is Calling: EDIN VIRAMONTES [[id number]]      What is the request in detail: Pt is requesting a call back from clinical team. Pt state she needs a return work paper. Pt states she able to  paper from  [facility] office. Please contact to further discuss and advise.          Can the clinic reply by [facility]:       [facility]: [phone]            Spoke with patient. Patient states she will come Friday to [facility] to  note.

## 2020-06-30 ENCOUNTER — TELEPHONE (OUTPATIENT)
Dept: OBSTETRICS AND GYNECOLOGY | Facility: CLINIC | Age: 30
End: 2020-06-30

## 2020-06-30 DIAGNOSIS — Z36.3 ENCOUNTER FOR ANTENATAL SCREENING FOR MALFORMATION USING ULTRASOUND: Primary | ICD-10-CM

## 2020-07-15 NOTE — PROGRESS NOTES
CC: Positive Pregnancy Test    HISTORY OF PRESENT ILLNESS:    Maryan Grady is a 29 y.o. female, ,  Presents today for a routine exam complaining of amenorrhea and positive home urine pregnancy test.  Patient's last menstrual period was 2020.  New patient to me-sees Dr. Salas. Third pregnancy-prior  x 2. Two boys at home, ages 7 and 16 months. Currently weaning youngest from the breast-only feeding at night. Reports nausea but no vomiting. Reports breast tenderness. Denies pelvic pain. No vaginal bleeding since LMP. Just came from US-see results below. Dates not c/w LMP. Feels like her feet are swollen off and on-drinks plenty of water, limits salt intake.    LMP: 4/15/20  EGA: 13w1d  EDC: 21    Pregnancy   =========   [place] pregnancy. Number of fetuses: 1     Dating   ======   LMP on: 4/15/2020   Cycle: irregular cycle, LMP date uncertain   GA by LMP 13 w + 1 d   AGATHA by LMP: 2021   Ultrasound examination on: 2020   GA by U/S based upon: CRL   GA by U/S 10 w + 0 d   AGATHA by U/S: 2021   Assigned: based on ultrasound (CRL), selected on 2020   Assigned GA 10 w + 0 d   Assigned AGATHA: 2021   Pregnancy length 280 d        ROS:  GENERAL: No weight changes. No swelling. No fatigue. No fever.  CARDIOVASCULAR: No chest pain. No shortness of breath. No leg cramps.   NEUROLOGICAL: No headaches. No vision changes.  BREASTS: No pain. No lumps. No discharge.  ABDOMEN: No pain. No diarrhea. No constipation.  REPRODUCTIVE: No abnormal bleeding.   VULVA: No pain. No lesions. No itching.  VAGINA: No relaxation. No itching. No odor. No discharge. No lesions.  URINARY: No incontinence. No nocturia. No frequency. No dysuria.    MEDICATIONS AND ALLERGIES:  Reviewed    COMPREHENSIVE GYN HISTORY:  PAP History: Denies abnormal Paps.  Infection History: Denies STDs. Denies PID.  Benign History: Denies uterine fibroids. Denies ovarian cysts. Denies endometriosis. Denies other  "conditions.  Cancer History: Denies cervical cancer. Denies uterine cancer or hyperplasia. Denies ovarian cancer. Denies vulvar cancer or pre-cancer. Denies vaginal cancer or pre-cancer. Denies breast cancer. Denies colon cancer.  Sexual Activity History: Reports currently being sexually active  Menstrual History: None.  Contraception: None    /64   Ht 5' 4" (1.626 m)   Wt 80.7 kg (177 lb 14.6 oz)   LMP 2020   BMI 30.54 kg/m²     PE:  AFFECT: Calm, alert and oriented X 3. Interactive during exam  GENERAL: Appears well-nourished, well-developed, in no acute distress.  HEAD: Normocephalic, atruamatic  TEETH: Good dentition.  THYROID: No thyromegally   BREASTS: No masses, skin changes, nipple discharge or adenopathy bilaterally.  SKIN: Normal for race, warm, & dry. No lesions or rashes.  LUNGS: Easy and unlabored, clear to auscultation bilaterally.  HEART: Regular rate and rhythm   ABDOMEN: Soft and nontender without masses or organomegally.  VULVA: No lesions, masses or tenderness.  VAGINA: Moist and well rugated without lesions or discharge.  CERVIX: Moist and pink without lesions, discharge or tenderness.      UTERUS SIZE: 10 week size, nontender and without masses.  ADNEXA: No masses or tenderness.  ESTIMATE OF PELVIC CAPACITY: Adequate  EXTREMITIES: No cyanosis, clubbing or edema. No calf tenderness.  LYMPH NODES: No axillary or inguinal adenopathy.    PROCEDURES:  UPT Positive  Genprobe    ASSESSMENT/PLAN:  Amenorrhea  Positive urine pregnancy test -  Routine prenatal care    Nausea and vomiting in pregnancy    -  Education regarding lifestyle and dietary modifications    -  Advised use of B6/Unisom. Pt will notify us if no relief/worsening symptoms, will consider Zofran if needed.    1st TRIMESTER COUNSELING: Discussed all, booklet provided:  Common complaints of pregnancy  HIV and other routine prenatal tests including  genetic screening  Risk factors identified by prenatal " history  Oriented to practice - discussed anticipated course of prenatal care & indications for Ultrasound  Childbirth classes/Hospital facilities   Nutrition and weight gain counseling  Toxoplasmosis precautions (Cats/Raw Meat)  Sexual activity and exercise  Environmental/Work hazards  Travel  Tobacco (Ask, Advise, Assess, Assist, and Arrange), as well as alcohol and drug use  Use of any medications (Including supplements, Vitamins, Herbs, or OTC Drugs)  Domestic violence  Seat belt use    TERATOLOGY COUNSELING:   Discussed indications and options for aneuploidy screening - pamphlets given    -  Pt is interested, did quad screen last pregnancy. Questions about F82-tyul contact insurance for coverage    FOLLOW-UP in 4 weeks with Dr. Salas  Labs and US today  Pap current  Sequential Screening ordered    Eleanor Hartley NP  OB/GYN

## 2020-07-16 ENCOUNTER — PROCEDURE VISIT (OUTPATIENT)
Dept: OBSTETRICS AND GYNECOLOGY | Facility: CLINIC | Age: 30
End: 2020-07-16
Attending: OBSTETRICS & GYNECOLOGY
Payer: COMMERCIAL

## 2020-07-16 ENCOUNTER — OFFICE VISIT (OUTPATIENT)
Dept: OBSTETRICS AND GYNECOLOGY | Facility: CLINIC | Age: 30
End: 2020-07-16
Payer: COMMERCIAL

## 2020-07-16 VITALS
HEIGHT: 64 IN | SYSTOLIC BLOOD PRESSURE: 120 MMHG | BODY MASS INDEX: 30.38 KG/M2 | DIASTOLIC BLOOD PRESSURE: 64 MMHG | WEIGHT: 177.94 LBS

## 2020-07-16 DIAGNOSIS — Z36.89 ESTABLISH GESTATIONAL AGE, ULTRASOUND: ICD-10-CM

## 2020-07-16 DIAGNOSIS — N91.2 AMENORRHEA: Primary | ICD-10-CM

## 2020-07-16 DIAGNOSIS — Z32.01 POSITIVE PREGNANCY TEST: ICD-10-CM

## 2020-07-16 DIAGNOSIS — Z36.3 ENCOUNTER FOR ANTENATAL SCREENING FOR MALFORMATION USING ULTRASOUND: ICD-10-CM

## 2020-07-16 LAB
B-HCG UR QL: POSITIVE
CTP QC/QA: YES

## 2020-07-16 PROCEDURE — 87491 CHLMYD TRACH DNA AMP PROBE: CPT

## 2020-07-16 PROCEDURE — 76801 PR US, OB <14WKS, TRANSABD, SINGLE GESTATION: ICD-10-PCS | Mod: S$GLB,,, | Performed by: OBSTETRICS & GYNECOLOGY

## 2020-07-16 PROCEDURE — 99999 PR PBB SHADOW E&M-EST. PATIENT-LVL III: ICD-10-PCS | Mod: PBBFAC,,, | Performed by: NURSE PRACTITIONER

## 2020-07-16 PROCEDURE — 87086 URINE CULTURE/COLONY COUNT: CPT

## 2020-07-16 PROCEDURE — 99214 PR OFFICE/OUTPT VISIT, EST, LEVL IV, 30-39 MIN: ICD-10-PCS | Mod: S$GLB,,, | Performed by: NURSE PRACTITIONER

## 2020-07-16 PROCEDURE — 99999 PR PBB SHADOW E&M-EST. PATIENT-LVL III: CPT | Mod: PBBFAC,,, | Performed by: NURSE PRACTITIONER

## 2020-07-16 PROCEDURE — 76801 OB US < 14 WKS SINGLE FETUS: CPT | Mod: S$GLB,,, | Performed by: OBSTETRICS & GYNECOLOGY

## 2020-07-16 PROCEDURE — 99214 OFFICE O/P EST MOD 30 MIN: CPT | Mod: S$GLB,,, | Performed by: NURSE PRACTITIONER

## 2020-07-16 NOTE — PATIENT INSTRUCTIONS
[facility] DELIVERY PHONE NUMBER, [phone] (OPEN 24/7, LOCATED ON 6TH FLOOR OF HOSPITAL)  [address] PHONE NUMBER, [phone] (OPEN MON-FRI, 8a-5p)

## 2020-07-17 LAB
BACTERIA UR CULT: NORMAL
C TRACH DNA SPEC QL NAA+PROBE: NOT DETECTED
N GONORRHOEA DNA SPEC QL NAA+PROBE: NOT DETECTED

## 2020-08-06 ENCOUNTER — PROCEDURE VISIT (OUTPATIENT)
Dept: MATERNAL FETAL MEDICINE | Facility: CLINIC | Age: 30
End: 2020-08-06
Payer: COMMERCIAL

## 2020-08-06 ENCOUNTER — LAB VISIT (OUTPATIENT)
Dept: LAB | Facility: OTHER | Age: 30
End: 2020-08-06
Attending: NURSE PRACTITIONER
Payer: COMMERCIAL

## 2020-08-06 VITALS — BODY MASS INDEX: 32.09 KG/M2 | WEIGHT: 186.94 LBS

## 2020-08-06 DIAGNOSIS — Z36.89 ENCOUNTER FOR FETAL ANATOMIC SURVEY: ICD-10-CM

## 2020-08-06 DIAGNOSIS — Z32.01 POSITIVE PREGNANCY TEST: ICD-10-CM

## 2020-08-06 DIAGNOSIS — N91.2 AMENORRHEA: ICD-10-CM

## 2020-08-06 DIAGNOSIS — Z36.82 ENCOUNTER FOR ANTENATAL SCREENING FOR NUCHAL TRANSLUCENCY: Primary | ICD-10-CM

## 2020-08-06 LAB
ABO + RH BLD: NORMAL
ANION GAP SERPL CALC-SCNC: 9 MMOL/L (ref 8–16)
BASOPHILS # BLD AUTO: 0.04 K/UL (ref 0–0.2)
BASOPHILS NFR BLD: 0.5 % (ref 0–1.9)
BLD GP AB SCN CELLS X3 SERPL QL: NORMAL
BUN SERPL-MCNC: 11 MG/DL (ref 6–20)
CALCIUM SERPL-MCNC: 9.3 MG/DL (ref 8.7–10.5)
CHLORIDE SERPL-SCNC: 104 MMOL/L (ref 95–110)
CO2 SERPL-SCNC: 20 MMOL/L (ref 23–29)
CREAT SERPL-MCNC: 0.7 MG/DL (ref 0.5–1.4)
DIFFERENTIAL METHOD: ABNORMAL
EOSINOPHIL # BLD AUTO: 0.1 K/UL (ref 0–0.5)
EOSINOPHIL NFR BLD: 1.6 % (ref 0–8)
ERYTHROCYTE [DISTWIDTH] IN BLOOD BY AUTOMATED COUNT: 13.6 % (ref 11.5–14.5)
EST. GFR  (AFRICAN AMERICAN): >60 ML/MIN/1.73 M^2
EST. GFR  (NON AFRICAN AMERICAN): >60 ML/MIN/1.73 M^2
GLUCOSE SERPL-MCNC: 113 MG/DL (ref 70–110)
HCT VFR BLD AUTO: 35.6 % (ref 37–48.5)
HGB BLD-MCNC: 12 G/DL (ref 12–16)
IMM GRANULOCYTES # BLD AUTO: 0.05 K/UL (ref 0–0.04)
IMM GRANULOCYTES NFR BLD AUTO: 0.7 % (ref 0–0.5)
LYMPHOCYTES # BLD AUTO: 2.2 K/UL (ref 1–4.8)
LYMPHOCYTES NFR BLD: 29.3 % (ref 18–48)
MCH RBC QN AUTO: 28.5 PG (ref 27–31)
MCHC RBC AUTO-ENTMCNC: 33.7 G/DL (ref 32–36)
MCV RBC AUTO: 85 FL (ref 82–98)
MONOCYTES # BLD AUTO: 0.4 K/UL (ref 0.3–1)
MONOCYTES NFR BLD: 4.7 % (ref 4–15)
NEUTROPHILS # BLD AUTO: 4.7 K/UL (ref 1.8–7.7)
NEUTROPHILS NFR BLD: 63.2 % (ref 38–73)
NRBC BLD-RTO: 0 /100 WBC
PLATELET # BLD AUTO: 276 K/UL (ref 150–350)
PMV BLD AUTO: 11.1 FL (ref 9.2–12.9)
POTASSIUM SERPL-SCNC: 4 MMOL/L (ref 3.5–5.1)
RBC # BLD AUTO: 4.21 M/UL (ref 4–5.4)
SODIUM SERPL-SCNC: 133 MMOL/L (ref 136–145)
WBC # BLD AUTO: 7.37 K/UL (ref 3.9–12.7)

## 2020-08-06 PROCEDURE — 86703 HIV-1/HIV-2 1 RESULT ANTBDY: CPT

## 2020-08-06 PROCEDURE — 86592 SYPHILIS TEST NON-TREP QUAL: CPT

## 2020-08-06 PROCEDURE — 76813 OB US NUCHAL MEAS 1 GEST: CPT | Mod: S$GLB,,, | Performed by: OBSTETRICS & GYNECOLOGY

## 2020-08-06 PROCEDURE — 76813 PR US, OB NUCHAL, TRANSABDOM/TRANSVAG, FIRST GESTATION: ICD-10-PCS | Mod: S$GLB,,, | Performed by: OBSTETRICS & GYNECOLOGY

## 2020-08-06 PROCEDURE — 80048 BASIC METABOLIC PNL TOTAL CA: CPT

## 2020-08-06 PROCEDURE — 86901 BLOOD TYPING SEROLOGIC RH(D): CPT

## 2020-08-06 PROCEDURE — 86762 RUBELLA ANTIBODY: CPT

## 2020-08-06 PROCEDURE — 36415 COLL VENOUS BLD VENIPUNCTURE: CPT

## 2020-08-06 PROCEDURE — 87340 HEPATITIS B SURFACE AG IA: CPT

## 2020-08-06 PROCEDURE — 85025 COMPLETE CBC W/AUTO DIFF WBC: CPT

## 2020-08-07 LAB
HBV SURFACE AG SERPL QL IA: NEGATIVE
HIV 1+2 AB+HIV1 P24 AG SERPL QL IA: NEGATIVE
RPR SER QL: NORMAL
RUBV IGG SER-ACNC: 196 IU/ML
RUBV IGG SER-IMP: REACTIVE

## 2020-08-14 ENCOUNTER — ROUTINE PRENATAL (OUTPATIENT)
Dept: OBSTETRICS AND GYNECOLOGY | Facility: CLINIC | Age: 30
End: 2020-08-14
Attending: OBSTETRICS & GYNECOLOGY
Payer: COMMERCIAL

## 2020-08-14 VITALS — SYSTOLIC BLOOD PRESSURE: 112 MMHG | BODY MASS INDEX: 32.35 KG/M2 | DIASTOLIC BLOOD PRESSURE: 66 MMHG | WEIGHT: 188.5 LBS

## 2020-08-14 DIAGNOSIS — Z34.90 PREGNANCY WITH ONE FETUS, ANTEPARTUM: Primary | ICD-10-CM

## 2020-08-14 PROCEDURE — 99999 PR PBB SHADOW E&M-EST. PATIENT-LVL III: CPT | Mod: PBBFAC,,, | Performed by: OBSTETRICS & GYNECOLOGY

## 2020-08-14 PROCEDURE — 0502F PR SUBSEQUENT PRENATAL CARE: ICD-10-PCS | Mod: CPTII,S$GLB,, | Performed by: OBSTETRICS & GYNECOLOGY

## 2020-08-14 PROCEDURE — 0502F SUBSEQUENT PRENATAL CARE: CPT | Mod: CPTII,S$GLB,, | Performed by: OBSTETRICS & GYNECOLOGY

## 2020-08-14 PROCEDURE — 99999 PR PBB SHADOW E&M-EST. PATIENT-LVL III: ICD-10-PCS | Mod: PBBFAC,,, | Performed by: OBSTETRICS & GYNECOLOGY

## 2020-08-14 NOTE — PROGRESS NOTES
Starting to feel movement.  Discussed weight gain, would like to meet with dietician   Serum integrated 8/20 or after.  F/U 4 weeks.    Precautions reviewed.

## 2020-08-20 ENCOUNTER — LAB VISIT (OUTPATIENT)
Dept: LAB | Facility: OTHER | Age: 30
End: 2020-08-20
Attending: OBSTETRICS & GYNECOLOGY
Payer: COMMERCIAL

## 2020-08-20 DIAGNOSIS — Z34.90 PREGNANCY WITH ONE FETUS, ANTEPARTUM: ICD-10-CM

## 2020-08-20 PROCEDURE — 36415 COLL VENOUS BLD VENIPUNCTURE: CPT

## 2020-08-24 LAB
# FETUSES US: NORMAL
AFP MOM SERPL: 0.66
AFP SERPL-MCNC: 18.2 NG/ML
AGE AT DELIVERY: 30
B-HCG MOM SERPL: 0.94
B-HCG SERPL-ACNC: 57 IU/ML
COLLECT DATE BLD: NORMAL
COLLECT DATE: NORMAL
FET TS 21 RISK FROM MAT AGE: NORMAL
GA (DAYS): 0 D
GA (WEEKS): 13 WK
GA METHOD: NORMAL
GEST. AGE (DAYS) 2ND SAMPLE (SI2): 0
GEST. AGE (WKS) 2ND SAMPLE (SI2): 15
IDDM PATIENT QL: NORMAL
INHIBIN A MOM SERPL: 1.05
INHIBIN A SERPL-MCNC: 153.2 PG/ML
INTEGRATED SCN PATIENT-IMP NAR: NORMAL
INTEGRATED SCN PATIENT-IMP: NEGATIVE
PAPP-A MOM SERPL: 0.56
PAPP-A SERPL-MCNC: 909.9 NG/ML
SMOKING STATUS FTND: NO
TS 18 RISK FETUS: NORMAL
TS 21 RISK FETUS: NORMAL
U ESTRIOL MOM SERPL: 1.3
U ESTRIOL SERPL-MCNC: 0.72 NG/ML

## 2020-09-24 ENCOUNTER — PROCEDURE VISIT (OUTPATIENT)
Dept: MATERNAL FETAL MEDICINE | Facility: CLINIC | Age: 30
End: 2020-09-24
Payer: COMMERCIAL

## 2020-09-24 DIAGNOSIS — Z36.89 ENCOUNTER FOR FETAL ANATOMIC SURVEY: ICD-10-CM

## 2020-09-24 PROCEDURE — 76805 OB US >/= 14 WKS SNGL FETUS: CPT | Mod: S$GLB,,, | Performed by: OBSTETRICS & GYNECOLOGY

## 2020-09-24 PROCEDURE — 76805 PR US, OB 14+WKS, TRANSABD, SINGLE GESTATION: ICD-10-PCS | Mod: S$GLB,,, | Performed by: OBSTETRICS & GYNECOLOGY

## 2020-09-28 ENCOUNTER — PATIENT MESSAGE (OUTPATIENT)
Dept: OBSTETRICS AND GYNECOLOGY | Facility: CLINIC | Age: 30
End: 2020-09-28

## 2020-09-29 ENCOUNTER — TELEPHONE (OUTPATIENT)
Dept: OBSTETRICS AND GYNECOLOGY | Facility: CLINIC | Age: 30
End: 2020-09-29

## 2020-09-29 NOTE — TELEPHONE ENCOUNTER
----- Message from Jerilyn Burrell sent at 9/29/2020 12:44 PM CDT -----  Name of Who is Calling: EDIN VIRAMONTES [[id number]]      What is the request in detail: Pt is calling to speak to staff in regards to a missed call... Please call to further assist .       Can the clinic reply by [facility]: N      [facility] [phone]        Left message for patient to give the office a call back.

## 2020-10-08 ENCOUNTER — PROCEDURE VISIT (OUTPATIENT)
Dept: MATERNAL FETAL MEDICINE | Facility: CLINIC | Age: 30
End: 2020-10-08
Payer: COMMERCIAL

## 2020-10-08 PROCEDURE — 76816 PR  US,PREGNANT UTERUS,F/U,TRANSABD APP: ICD-10-PCS | Mod: S$GLB,,, | Performed by: OBSTETRICS & GYNECOLOGY

## 2020-10-08 PROCEDURE — 76816 OB US FOLLOW-UP PER FETUS: CPT | Mod: S$GLB,,, | Performed by: OBSTETRICS & GYNECOLOGY

## 2020-10-16 ENCOUNTER — ROUTINE PRENATAL (OUTPATIENT)
Dept: OBSTETRICS AND GYNECOLOGY | Facility: CLINIC | Age: 30
End: 2020-10-16
Attending: OBSTETRICS & GYNECOLOGY
Payer: COMMERCIAL

## 2020-10-16 VITALS
SYSTOLIC BLOOD PRESSURE: 104 MMHG | BODY MASS INDEX: 33.11 KG/M2 | DIASTOLIC BLOOD PRESSURE: 56 MMHG | WEIGHT: 192.88 LBS

## 2020-10-16 DIAGNOSIS — O26.899 PREGNANCY RELATED HIP PAIN, ANTEPARTUM: ICD-10-CM

## 2020-10-16 DIAGNOSIS — M54.9 BACK PAIN AFFECTING PREGNANCY, ANTEPARTUM: ICD-10-CM

## 2020-10-16 DIAGNOSIS — O99.891 BACK PAIN AFFECTING PREGNANCY, ANTEPARTUM: ICD-10-CM

## 2020-10-16 DIAGNOSIS — Z34.90 PREGNANCY WITH ONE FETUS, ANTEPARTUM: Primary | ICD-10-CM

## 2020-10-16 DIAGNOSIS — M25.559 PREGNANCY RELATED HIP PAIN, ANTEPARTUM: ICD-10-CM

## 2020-10-16 PROCEDURE — 0502F PR SUBSEQUENT PRENATAL CARE: ICD-10-PCS | Mod: CPTII,S$GLB,, | Performed by: OBSTETRICS & GYNECOLOGY

## 2020-10-16 PROCEDURE — 99999 PR PBB SHADOW E&M-EST. PATIENT-LVL II: ICD-10-PCS | Mod: PBBFAC,,, | Performed by: OBSTETRICS & GYNECOLOGY

## 2020-10-16 PROCEDURE — 99999 PR PBB SHADOW E&M-EST. PATIENT-LVL II: CPT | Mod: PBBFAC,,, | Performed by: OBSTETRICS & GYNECOLOGY

## 2020-10-16 PROCEDURE — 0502F SUBSEQUENT PRENATAL CARE: CPT | Mod: CPTII,S$GLB,, | Performed by: OBSTETRICS & GYNECOLOGY

## 2020-10-16 NOTE — PROGRESS NOTES
Back and hip pain, discussed chiro. And support belt.  Tylenol as needed. Getting massages monthly.  Poor sleep due to discomfort, unisom or benadryl as needed.

## 2020-11-16 ENCOUNTER — LAB VISIT (OUTPATIENT)
Dept: LAB | Facility: OTHER | Age: 30
End: 2020-11-16
Attending: OBSTETRICS & GYNECOLOGY
Payer: COMMERCIAL

## 2020-11-16 ENCOUNTER — PATIENT MESSAGE (OUTPATIENT)
Dept: OBSTETRICS AND GYNECOLOGY | Facility: CLINIC | Age: 30
End: 2020-11-16

## 2020-11-16 ENCOUNTER — ROUTINE PRENATAL (OUTPATIENT)
Dept: OBSTETRICS AND GYNECOLOGY | Facility: CLINIC | Age: 30
End: 2020-11-16
Attending: OBSTETRICS & GYNECOLOGY
Payer: COMMERCIAL

## 2020-11-16 VITALS
WEIGHT: 194.88 LBS | DIASTOLIC BLOOD PRESSURE: 60 MMHG | BODY MASS INDEX: 33.45 KG/M2 | SYSTOLIC BLOOD PRESSURE: 110 MMHG

## 2020-11-16 DIAGNOSIS — Z34.90 PREGNANCY WITH ONE FETUS, ANTEPARTUM: ICD-10-CM

## 2020-11-16 DIAGNOSIS — Z34.90 PREGNANCY WITH ONE FETUS, ANTEPARTUM: Primary | ICD-10-CM

## 2020-11-16 DIAGNOSIS — O26.899 PREGNANCY RELATED HIP PAIN, ANTEPARTUM: ICD-10-CM

## 2020-11-16 DIAGNOSIS — M25.559 PREGNANCY RELATED HIP PAIN, ANTEPARTUM: ICD-10-CM

## 2020-11-16 DIAGNOSIS — R73.09 ABNORMAL GLUCOSE: Primary | ICD-10-CM

## 2020-11-16 LAB
BASOPHILS # BLD AUTO: 0.03 K/UL (ref 0–0.2)
BASOPHILS NFR BLD: 0.4 % (ref 0–1.9)
DIFFERENTIAL METHOD: ABNORMAL
EOSINOPHIL # BLD AUTO: 0.1 K/UL (ref 0–0.5)
EOSINOPHIL NFR BLD: 1.3 % (ref 0–8)
ERYTHROCYTE [DISTWIDTH] IN BLOOD BY AUTOMATED COUNT: 13.9 % (ref 11.5–14.5)
GLUCOSE SERPL-MCNC: 150 MG/DL (ref 70–140)
HCT VFR BLD AUTO: 32.2 % (ref 37–48.5)
HGB BLD-MCNC: 10.2 G/DL (ref 12–16)
IMM GRANULOCYTES # BLD AUTO: 0.13 K/UL (ref 0–0.04)
IMM GRANULOCYTES NFR BLD AUTO: 1.9 % (ref 0–0.5)
LYMPHOCYTES # BLD AUTO: 1.7 K/UL (ref 1–4.8)
LYMPHOCYTES NFR BLD: 24.9 % (ref 18–48)
MCH RBC QN AUTO: 27.3 PG (ref 27–31)
MCHC RBC AUTO-ENTMCNC: 31.7 G/DL (ref 32–36)
MCV RBC AUTO: 86 FL (ref 82–98)
MONOCYTES # BLD AUTO: 0.3 K/UL (ref 0.3–1)
MONOCYTES NFR BLD: 4.8 % (ref 4–15)
NEUTROPHILS # BLD AUTO: 4.6 K/UL (ref 1.8–7.7)
NEUTROPHILS NFR BLD: 66.7 % (ref 38–73)
NRBC BLD-RTO: 0 /100 WBC
PLATELET # BLD AUTO: 253 K/UL (ref 150–350)
PMV BLD AUTO: 10.5 FL (ref 9.2–12.9)
RBC # BLD AUTO: 3.74 M/UL (ref 4–5.4)
WBC # BLD AUTO: 6.84 K/UL (ref 3.9–12.7)

## 2020-11-16 PROCEDURE — 85025 COMPLETE CBC W/AUTO DIFF WBC: CPT

## 2020-11-16 PROCEDURE — 82950 GLUCOSE TEST: CPT

## 2020-11-16 PROCEDURE — 99999 PR PBB SHADOW E&M-EST. PATIENT-LVL III: ICD-10-PCS | Mod: PBBFAC,,, | Performed by: OBSTETRICS & GYNECOLOGY

## 2020-11-16 PROCEDURE — 36415 COLL VENOUS BLD VENIPUNCTURE: CPT

## 2020-11-16 PROCEDURE — 99999 PR PBB SHADOW E&M-EST. PATIENT-LVL III: CPT | Mod: PBBFAC,,, | Performed by: OBSTETRICS & GYNECOLOGY

## 2020-11-16 PROCEDURE — 0502F PR SUBSEQUENT PRENATAL CARE: ICD-10-PCS | Mod: CPTII,S$GLB,, | Performed by: OBSTETRICS & GYNECOLOGY

## 2020-11-16 PROCEDURE — 0502F SUBSEQUENT PRENATAL CARE: CPT | Mod: CPTII,S$GLB,, | Performed by: OBSTETRICS & GYNECOLOGY

## 2020-11-16 NOTE — PROGRESS NOTES
Peds: [facility]  Contraception: partner vasectomy  Declines flu shot, OK with Tdap   Glucose today.  tdap next visit.

## 2020-11-20 ENCOUNTER — LAB VISIT (OUTPATIENT)
Dept: LAB | Facility: OTHER | Age: 30
End: 2020-11-20
Attending: OBSTETRICS & GYNECOLOGY
Payer: COMMERCIAL

## 2020-11-20 DIAGNOSIS — R73.09 ABNORMAL GLUCOSE: ICD-10-CM

## 2020-11-20 LAB
GLUCOSE SERPL-MCNC: 109 MG/DL
GLUCOSE SERPL-MCNC: 115 MG/DL
GLUCOSE SERPL-MCNC: 60 MG/DL
GLUCOSE SERPL-MCNC: 87 MG/DL (ref 70–110)

## 2020-11-20 PROCEDURE — 82952 GTT-ADDED SAMPLES: CPT

## 2020-11-20 PROCEDURE — 36415 COLL VENOUS BLD VENIPUNCTURE: CPT

## 2020-11-20 PROCEDURE — 82951 GLUCOSE TOLERANCE TEST (GTT): CPT

## 2020-12-07 ENCOUNTER — ROUTINE PRENATAL (OUTPATIENT)
Dept: OBSTETRICS AND GYNECOLOGY | Facility: CLINIC | Age: 30
End: 2020-12-07
Attending: OBSTETRICS & GYNECOLOGY
Payer: COMMERCIAL

## 2020-12-07 VITALS
DIASTOLIC BLOOD PRESSURE: 68 MMHG | WEIGHT: 191.56 LBS | SYSTOLIC BLOOD PRESSURE: 112 MMHG | BODY MASS INDEX: 32.88 KG/M2

## 2020-12-07 DIAGNOSIS — O99.891 BACK PAIN AFFECTING PREGNANCY, ANTEPARTUM: ICD-10-CM

## 2020-12-07 DIAGNOSIS — Z34.90 PREGNANCY WITH ONE FETUS, ANTEPARTUM: Primary | ICD-10-CM

## 2020-12-07 DIAGNOSIS — M54.9 BACK PAIN AFFECTING PREGNANCY, ANTEPARTUM: ICD-10-CM

## 2020-12-07 PROCEDURE — 0502F SUBSEQUENT PRENATAL CARE: CPT | Mod: CPTII,S$GLB,, | Performed by: OBSTETRICS & GYNECOLOGY

## 2020-12-07 PROCEDURE — 99999 PR PBB SHADOW E&M-EST. PATIENT-LVL II: CPT | Mod: PBBFAC,,, | Performed by: OBSTETRICS & GYNECOLOGY

## 2020-12-07 PROCEDURE — 99999 PR PBB SHADOW E&M-EST. PATIENT-LVL II: ICD-10-PCS | Mod: PBBFAC,,, | Performed by: OBSTETRICS & GYNECOLOGY

## 2020-12-07 PROCEDURE — 0502F PR SUBSEQUENT PRENATAL CARE: ICD-10-PCS | Mod: CPTII,S$GLB,, | Performed by: OBSTETRICS & GYNECOLOGY

## 2020-12-07 RX ORDER — SERTRALINE HYDROCHLORIDE 25 MG/1
25 TABLET, FILM COATED ORAL DAILY
Qty: 30 TABLET | Refills: 2 | Status: SHIPPED | OUTPATIENT
Start: 2020-12-07 | End: 2022-02-07

## 2020-12-07 NOTE — PROGRESS NOTES
Reports increased anxiety and occasional panic.  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  Reports poor sleep due to anxiety, trouble slowing down her brain or reports waking very early and mind racing.  Dicussed options for cold symptoms.  Discussed options for management: therapy, zoloft (had not started yet), improved sleep.  Will start zoloft, will use unisom as needed, will start virtual visits for therapy.

## 2020-12-08 ENCOUNTER — TELEPHONE (OUTPATIENT)
Dept: PSYCHIATRY | Facility: CLINIC | Age: 30
End: 2020-12-08

## 2021-01-05 ENCOUNTER — IMMUNIZATION (OUTPATIENT)
Dept: PHARMACY | Facility: CLINIC | Age: 31
End: 2021-01-05
Payer: COMMERCIAL

## 2021-01-05 ENCOUNTER — ROUTINE PRENATAL (OUTPATIENT)
Dept: OBSTETRICS AND GYNECOLOGY | Facility: CLINIC | Age: 31
End: 2021-01-05
Attending: OBSTETRICS & GYNECOLOGY
Payer: COMMERCIAL

## 2021-01-05 VITALS
WEIGHT: 194.69 LBS | DIASTOLIC BLOOD PRESSURE: 70 MMHG | BODY MASS INDEX: 33.41 KG/M2 | SYSTOLIC BLOOD PRESSURE: 118 MMHG

## 2021-01-05 DIAGNOSIS — Z3A.34 34 WEEKS GESTATION OF PREGNANCY: Primary | ICD-10-CM

## 2021-01-05 PROCEDURE — 0502F PR SUBSEQUENT PRENATAL CARE: ICD-10-PCS | Mod: CPTII,S$GLB,, | Performed by: NURSE PRACTITIONER

## 2021-01-05 PROCEDURE — 0502F SUBSEQUENT PRENATAL CARE: CPT | Mod: CPTII,S$GLB,, | Performed by: NURSE PRACTITIONER

## 2021-01-05 PROCEDURE — 99999 PR PBB SHADOW E&M-EST. PATIENT-LVL III: ICD-10-PCS | Mod: PBBFAC,,, | Performed by: NURSE PRACTITIONER

## 2021-01-05 PROCEDURE — 99999 PR PBB SHADOW E&M-EST. PATIENT-LVL III: CPT | Mod: PBBFAC,,, | Performed by: NURSE PRACTITIONER

## 2021-01-12 ENCOUNTER — LAB VISIT (OUTPATIENT)
Dept: LAB | Facility: OTHER | Age: 31
End: 2021-01-12
Payer: COMMERCIAL

## 2021-01-12 ENCOUNTER — ROUTINE PRENATAL (OUTPATIENT)
Dept: OBSTETRICS AND GYNECOLOGY | Facility: CLINIC | Age: 31
End: 2021-01-12
Payer: COMMERCIAL

## 2021-01-12 VITALS
BODY MASS INDEX: 34.32 KG/M2 | DIASTOLIC BLOOD PRESSURE: 64 MMHG | WEIGHT: 199.94 LBS | SYSTOLIC BLOOD PRESSURE: 102 MMHG

## 2021-01-12 DIAGNOSIS — Z34.80 SUPERVISION OF OTHER NORMAL PREGNANCY, ANTEPARTUM: Primary | ICD-10-CM

## 2021-01-12 DIAGNOSIS — Z34.80 SUPERVISION OF OTHER NORMAL PREGNANCY, ANTEPARTUM: ICD-10-CM

## 2021-01-12 LAB
BASOPHILS # BLD AUTO: 0.03 K/UL (ref 0–0.2)
BASOPHILS NFR BLD: 0.5 % (ref 0–1.9)
DIFFERENTIAL METHOD: ABNORMAL
EOSINOPHIL # BLD AUTO: 0.1 K/UL (ref 0–0.5)
EOSINOPHIL NFR BLD: 1.6 % (ref 0–8)
ERYTHROCYTE [DISTWIDTH] IN BLOOD BY AUTOMATED COUNT: 14.2 % (ref 11.5–14.5)
HCT VFR BLD AUTO: 31.9 % (ref 37–48.5)
HGB BLD-MCNC: 10 G/DL (ref 12–16)
IMM GRANULOCYTES # BLD AUTO: 0.1 K/UL (ref 0–0.04)
IMM GRANULOCYTES NFR BLD AUTO: 1.6 % (ref 0–0.5)
LYMPHOCYTES # BLD AUTO: 1.7 K/UL (ref 1–4.8)
LYMPHOCYTES NFR BLD: 27.1 % (ref 18–48)
MCH RBC QN AUTO: 25.6 PG (ref 27–31)
MCHC RBC AUTO-ENTMCNC: 31.3 G/DL (ref 32–36)
MCV RBC AUTO: 82 FL (ref 82–98)
MONOCYTES # BLD AUTO: 0.5 K/UL (ref 0.3–1)
MONOCYTES NFR BLD: 7.5 % (ref 4–15)
NEUTROPHILS # BLD AUTO: 3.8 K/UL (ref 1.8–7.7)
NEUTROPHILS NFR BLD: 61.7 % (ref 38–73)
NRBC BLD-RTO: 0 /100 WBC
PLATELET # BLD AUTO: 217 K/UL (ref 150–350)
PMV BLD AUTO: 11 FL (ref 9.2–12.9)
RBC # BLD AUTO: 3.9 M/UL (ref 4–5.4)
WBC # BLD AUTO: 6.16 K/UL (ref 3.9–12.7)

## 2021-01-12 PROCEDURE — 99999 PR PBB SHADOW E&M-EST. PATIENT-LVL II: ICD-10-PCS | Mod: PBBFAC,,, | Performed by: NURSE PRACTITIONER

## 2021-01-12 PROCEDURE — 86592 SYPHILIS TEST NON-TREP QUAL: CPT

## 2021-01-12 PROCEDURE — 86703 HIV-1/HIV-2 1 RESULT ANTBDY: CPT

## 2021-01-12 PROCEDURE — 0502F SUBSEQUENT PRENATAL CARE: CPT | Mod: CPTII,S$GLB,, | Performed by: NURSE PRACTITIONER

## 2021-01-12 PROCEDURE — 36415 COLL VENOUS BLD VENIPUNCTURE: CPT

## 2021-01-12 PROCEDURE — 87081 CULTURE SCREEN ONLY: CPT

## 2021-01-12 PROCEDURE — 85025 COMPLETE CBC W/AUTO DIFF WBC: CPT

## 2021-01-12 PROCEDURE — 99999 PR PBB SHADOW E&M-EST. PATIENT-LVL II: CPT | Mod: PBBFAC,,, | Performed by: NURSE PRACTITIONER

## 2021-01-12 PROCEDURE — 0502F PR SUBSEQUENT PRENATAL CARE: ICD-10-PCS | Mod: CPTII,S$GLB,, | Performed by: NURSE PRACTITIONER

## 2021-01-13 LAB — HIV 1+2 AB+HIV1 P24 AG SERPL QL IA: NEGATIVE

## 2021-01-14 LAB
BACTERIA SPEC AEROBE CULT: NORMAL
RPR SER QL: NORMAL

## 2021-01-25 ENCOUNTER — ROUTINE PRENATAL (OUTPATIENT)
Dept: OBSTETRICS AND GYNECOLOGY | Facility: CLINIC | Age: 31
End: 2021-01-25
Payer: COMMERCIAL

## 2021-01-25 ENCOUNTER — PATIENT MESSAGE (OUTPATIENT)
Dept: OBSTETRICS AND GYNECOLOGY | Facility: CLINIC | Age: 31
End: 2021-01-25

## 2021-01-25 VITALS
WEIGHT: 196.63 LBS | SYSTOLIC BLOOD PRESSURE: 116 MMHG | DIASTOLIC BLOOD PRESSURE: 68 MMHG | BODY MASS INDEX: 33.76 KG/M2

## 2021-01-25 DIAGNOSIS — Z34.90 PREGNANCY WITH ONE FETUS, ANTEPARTUM: Primary | ICD-10-CM

## 2021-01-25 PROCEDURE — 99999 PR PBB SHADOW E&M-EST. PATIENT-LVL II: ICD-10-PCS | Mod: PBBFAC,,, | Performed by: OBSTETRICS & GYNECOLOGY

## 2021-01-25 PROCEDURE — 99999 PR PBB SHADOW E&M-EST. PATIENT-LVL II: CPT | Mod: PBBFAC,,, | Performed by: OBSTETRICS & GYNECOLOGY

## 2021-01-25 PROCEDURE — 0502F SUBSEQUENT PRENATAL CARE: CPT | Mod: CPTII,S$GLB,, | Performed by: OBSTETRICS & GYNECOLOGY

## 2021-01-25 PROCEDURE — 0502F PR SUBSEQUENT PRENATAL CARE: ICD-10-PCS | Mod: CPTII,S$GLB,, | Performed by: OBSTETRICS & GYNECOLOGY

## 2021-01-26 DIAGNOSIS — Z34.90 TERM PREGNANCY: Primary | ICD-10-CM

## 2021-02-01 ENCOUNTER — ROUTINE PRENATAL (OUTPATIENT)
Dept: OBSTETRICS AND GYNECOLOGY | Facility: CLINIC | Age: 31
End: 2021-02-01
Payer: COMMERCIAL

## 2021-02-01 VITALS
BODY MASS INDEX: 34.36 KG/M2 | WEIGHT: 200.19 LBS | DIASTOLIC BLOOD PRESSURE: 64 MMHG | SYSTOLIC BLOOD PRESSURE: 120 MMHG

## 2021-02-01 DIAGNOSIS — Z34.90 PREGNANCY WITH ONE FETUS, ANTEPARTUM: Primary | ICD-10-CM

## 2021-02-01 PROCEDURE — 0502F SUBSEQUENT PRENATAL CARE: CPT | Mod: CPTII,S$GLB,, | Performed by: OBSTETRICS & GYNECOLOGY

## 2021-02-01 PROCEDURE — 0502F PR SUBSEQUENT PRENATAL CARE: ICD-10-PCS | Mod: CPTII,S$GLB,, | Performed by: OBSTETRICS & GYNECOLOGY

## 2021-02-01 PROCEDURE — 99999 PR PBB SHADOW E&M-EST. PATIENT-LVL II: ICD-10-PCS | Mod: PBBFAC,,, | Performed by: OBSTETRICS & GYNECOLOGY

## 2021-02-01 PROCEDURE — 99999 PR PBB SHADOW E&M-EST. PATIENT-LVL II: CPT | Mod: PBBFAC,,, | Performed by: OBSTETRICS & GYNECOLOGY

## 2021-02-10 ENCOUNTER — ROUTINE PRENATAL (OUTPATIENT)
Dept: OBSTETRICS AND GYNECOLOGY | Facility: CLINIC | Age: 31
End: 2021-02-10
Attending: OBSTETRICS & GYNECOLOGY
Payer: COMMERCIAL

## 2021-02-10 VITALS
WEIGHT: 202.38 LBS | BODY MASS INDEX: 34.74 KG/M2 | DIASTOLIC BLOOD PRESSURE: 62 MMHG | SYSTOLIC BLOOD PRESSURE: 126 MMHG

## 2021-02-10 DIAGNOSIS — Z34.90 PREGNANCY WITH ONE FETUS, ANTEPARTUM: Primary | ICD-10-CM

## 2021-02-10 PROCEDURE — 0502F SUBSEQUENT PRENATAL CARE: CPT | Mod: CPTII,S$GLB,, | Performed by: OBSTETRICS & GYNECOLOGY

## 2021-02-10 PROCEDURE — 99999 PR PBB SHADOW E&M-EST. PATIENT-LVL II: ICD-10-PCS | Mod: PBBFAC,,, | Performed by: OBSTETRICS & GYNECOLOGY

## 2021-02-10 PROCEDURE — 0502F PR SUBSEQUENT PRENATAL CARE: ICD-10-PCS | Mod: CPTII,S$GLB,, | Performed by: OBSTETRICS & GYNECOLOGY

## 2021-02-10 PROCEDURE — 99999 PR PBB SHADOW E&M-EST. PATIENT-LVL II: CPT | Mod: PBBFAC,,, | Performed by: OBSTETRICS & GYNECOLOGY

## 2021-02-11 ENCOUNTER — HOSPITAL ENCOUNTER (INPATIENT)
Facility: OTHER | Age: 31
LOS: 2 days | Discharge: HOME | End: 2021-02-13
Attending: OBSTETRICS & GYNECOLOGY | Admitting: OBSTETRICS & GYNECOLOGY
Payer: COMMERCIAL

## 2021-02-11 ENCOUNTER — ANESTHESIA EVENT (OUTPATIENT)
Dept: OBSTETRICS AND GYNECOLOGY | Facility: OTHER | Age: 31
End: 2021-02-11
Payer: COMMERCIAL

## 2021-02-11 ENCOUNTER — ANESTHESIA (OUTPATIENT)
Dept: OBSTETRICS AND GYNECOLOGY | Facility: OTHER | Age: 31
End: 2021-02-11
Payer: COMMERCIAL

## 2021-02-11 DIAGNOSIS — Z34.90 TERM PREGNANCY: ICD-10-CM

## 2021-02-11 LAB
ABO + RH BLD: NORMAL
BASOPHILS # BLD AUTO: 0.05 K/UL (ref 0–0.2)
BASOPHILS NFR BLD: 0.8 % (ref 0–1.9)
BLD GP AB SCN CELLS X3 SERPL QL: NORMAL
DIFFERENTIAL METHOD: ABNORMAL
EOSINOPHIL # BLD AUTO: 0.1 K/UL (ref 0–0.5)
EOSINOPHIL NFR BLD: 1.1 % (ref 0–8)
ERYTHROCYTE [DISTWIDTH] IN BLOOD BY AUTOMATED COUNT: 15.1 % (ref 11.5–14.5)
HCT VFR BLD AUTO: 34.2 % (ref 37–48.5)
HGB BLD-MCNC: 10.6 G/DL (ref 12–16)
IMM GRANULOCYTES # BLD AUTO: 0.09 K/UL (ref 0–0.04)
IMM GRANULOCYTES NFR BLD AUTO: 1.4 % (ref 0–0.5)
LYMPHOCYTES # BLD AUTO: 2 K/UL (ref 1–4.8)
LYMPHOCYTES NFR BLD: 30.9 % (ref 18–48)
MCH RBC QN AUTO: 24.7 PG (ref 27–31)
MCHC RBC AUTO-ENTMCNC: 31 G/DL (ref 32–36)
MCV RBC AUTO: 80 FL (ref 82–98)
MONOCYTES # BLD AUTO: 0.5 K/UL (ref 0.3–1)
MONOCYTES NFR BLD: 7.6 % (ref 4–15)
NEUTROPHILS # BLD AUTO: 3.7 K/UL (ref 1.8–7.7)
NEUTROPHILS NFR BLD: 58.2 % (ref 38–73)
NRBC BLD-RTO: 0 /100 WBC
PLATELET # BLD AUTO: 213 K/UL (ref 150–350)
PMV BLD AUTO: 11.6 FL (ref 9.2–12.9)
RBC # BLD AUTO: 4.29 M/UL (ref 4–5.4)
SARS-COV-2 RDRP RESP QL NAA+PROBE: NEGATIVE
WBC # BLD AUTO: 6.43 K/UL (ref 3.9–12.7)

## 2021-02-11 PROCEDURE — 51702 INSERT TEMP BLADDER CATH: CPT

## 2021-02-11 PROCEDURE — 25000003 PHARM REV CODE 250: Performed by: STUDENT IN AN ORGANIZED HEALTH CARE EDUCATION/TRAINING PROGRAM

## 2021-02-11 PROCEDURE — C1751 CATH, INF, PER/CENT/MIDLINE: HCPCS | Performed by: ANESTHESIOLOGY

## 2021-02-11 PROCEDURE — 63600175 PHARM REV CODE 636 W HCPCS: Performed by: STUDENT IN AN ORGANIZED HEALTH CARE EDUCATION/TRAINING PROGRAM

## 2021-02-11 PROCEDURE — U0002 COVID-19 LAB TEST NON-CDC: HCPCS

## 2021-02-11 PROCEDURE — 85025 COMPLETE CBC W/AUTO DIFF WBC: CPT

## 2021-02-11 PROCEDURE — 72200005 HC VAGINAL DELIVERY LEVEL II

## 2021-02-11 PROCEDURE — 59400 OBSTETRICAL CARE: CPT | Mod: GB,,, | Performed by: OBSTETRICS & GYNECOLOGY

## 2021-02-11 PROCEDURE — 11000001 HC ACUTE MED/SURG PRIVATE ROOM

## 2021-02-11 PROCEDURE — 62326 NJX INTERLAMINAR LMBR/SAC: CPT | Performed by: STUDENT IN AN ORGANIZED HEALTH CARE EDUCATION/TRAINING PROGRAM

## 2021-02-11 PROCEDURE — 27200710 HC EPIDURAL INFUSION PUMP SET: Performed by: ANESTHESIOLOGY

## 2021-02-11 PROCEDURE — 86900 BLOOD TYPING SEROLOGIC ABO: CPT

## 2021-02-11 PROCEDURE — 59400 OBSTETRICAL CARE: CPT | Mod: QY,,, | Performed by: ANESTHESIOLOGY

## 2021-02-11 PROCEDURE — 59400 PRA FULL ROUT OBSTE CARE,VAGINAL DELIV: ICD-10-PCS | Mod: QY,,, | Performed by: ANESTHESIOLOGY

## 2021-02-11 PROCEDURE — 59400 PR FULL ROUT OBSTE CARE,VAGINAL DELIV: ICD-10-PCS | Mod: GB,,, | Performed by: OBSTETRICS & GYNECOLOGY

## 2021-02-11 RX ORDER — HYDROCODONE BITARTRATE AND ACETAMINOPHEN 5; 325 MG/1; MG/1
1 TABLET ORAL EVERY 4 HOURS PRN
Status: DISCONTINUED | OUTPATIENT
Start: 2021-02-11 | End: 2021-02-13 | Stop reason: HOSPADM

## 2021-02-11 RX ORDER — SIMETHICONE 80 MG
1 TABLET,CHEWABLE ORAL 4 TIMES DAILY PRN
Status: DISCONTINUED | OUTPATIENT
Start: 2021-02-11 | End: 2021-02-11 | Stop reason: SDUPTHER

## 2021-02-11 RX ORDER — FENTANYL/BUPIVACAINE/NS/PF 2MCG/ML-.1
PLASTIC BAG, INJECTION (ML) INJECTION CONTINUOUS
Status: DISCONTINUED | OUTPATIENT
Start: 2021-02-11 | End: 2021-02-11

## 2021-02-11 RX ORDER — ONDANSETRON 8 MG/1
8 TABLET, ORALLY DISINTEGRATING ORAL EVERY 8 HOURS PRN
Status: DISCONTINUED | OUTPATIENT
Start: 2021-02-11 | End: 2021-02-13 | Stop reason: HOSPADM

## 2021-02-11 RX ORDER — SIMETHICONE 80 MG
1 TABLET,CHEWABLE ORAL EVERY 6 HOURS PRN
Status: DISCONTINUED | OUTPATIENT
Start: 2021-02-11 | End: 2021-02-13 | Stop reason: HOSPADM

## 2021-02-11 RX ORDER — HYDROCORTISONE 25 MG/G
CREAM TOPICAL 3 TIMES DAILY PRN
Status: DISCONTINUED | OUTPATIENT
Start: 2021-02-11 | End: 2021-02-13 | Stop reason: HOSPADM

## 2021-02-11 RX ORDER — FENTANYL CITRATE 50 UG/ML
INJECTION, SOLUTION INTRAMUSCULAR; INTRAVENOUS
Status: DISCONTINUED | OUTPATIENT
Start: 2021-02-11 | End: 2021-02-11

## 2021-02-11 RX ORDER — HYDROCODONE BITARTRATE AND ACETAMINOPHEN 10; 325 MG/1; MG/1
1 TABLET ORAL EVERY 4 HOURS PRN
Status: DISCONTINUED | OUTPATIENT
Start: 2021-02-11 | End: 2021-02-13 | Stop reason: HOSPADM

## 2021-02-11 RX ORDER — ONDANSETRON 8 MG/1
8 TABLET, ORALLY DISINTEGRATING ORAL EVERY 8 HOURS PRN
Status: DISCONTINUED | OUTPATIENT
Start: 2021-02-11 | End: 2021-02-11

## 2021-02-11 RX ORDER — SODIUM CHLORIDE 9 MG/ML
INJECTION, SOLUTION INTRAVENOUS
Status: DISCONTINUED | OUTPATIENT
Start: 2021-02-11 | End: 2021-02-11

## 2021-02-11 RX ORDER — OXYTOCIN/RINGER'S LACTATE 30/500 ML
0-30 PLASTIC BAG, INJECTION (ML) INTRAVENOUS CONTINUOUS
Status: DISCONTINUED | OUTPATIENT
Start: 2021-02-11 | End: 2021-02-11

## 2021-02-11 RX ORDER — SERTRALINE HYDROCHLORIDE 25 MG/1
25 TABLET, FILM COATED ORAL DAILY
Status: DISCONTINUED | OUTPATIENT
Start: 2021-02-11 | End: 2021-02-13 | Stop reason: HOSPADM

## 2021-02-11 RX ORDER — ACETAMINOPHEN 325 MG/1
650 TABLET ORAL EVERY 6 HOURS PRN
Status: DISCONTINUED | OUTPATIENT
Start: 2021-02-11 | End: 2021-02-13 | Stop reason: HOSPADM

## 2021-02-11 RX ORDER — SODIUM CHLORIDE, SODIUM LACTATE, POTASSIUM CHLORIDE, CALCIUM CHLORIDE 600; 310; 30; 20 MG/100ML; MG/100ML; MG/100ML; MG/100ML
INJECTION, SOLUTION INTRAVENOUS CONTINUOUS
Status: DISCONTINUED | OUTPATIENT
Start: 2021-02-11 | End: 2021-02-11

## 2021-02-11 RX ORDER — MISOPROSTOL 200 UG/1
TABLET ORAL
Status: DISCONTINUED
Start: 2021-02-11 | End: 2021-02-11 | Stop reason: WASHOUT

## 2021-02-11 RX ORDER — BUPIVACAINE HYDROCHLORIDE 2.5 MG/ML
INJECTION, SOLUTION EPIDURAL; INFILTRATION; INTRACAUDAL
Status: DISPENSED
Start: 2021-02-11 | End: 2021-02-12

## 2021-02-11 RX ORDER — BUPIVACAINE HYDROCHLORIDE 2.5 MG/ML
INJECTION, SOLUTION INFILTRATION; PERINEURAL
Status: DISCONTINUED | OUTPATIENT
Start: 2021-02-11 | End: 2021-02-11

## 2021-02-11 RX ORDER — OXYTOCIN/RINGER'S LACTATE 30/500 ML
334 PLASTIC BAG, INJECTION (ML) INTRAVENOUS ONCE
Status: COMPLETED | OUTPATIENT
Start: 2021-02-11 | End: 2021-02-11

## 2021-02-11 RX ORDER — PRENATAL WITH FERROUS FUM AND FOLIC ACID 3080; 920; 120; 400; 22; 1.84; 3; 20; 10; 1; 12; 200; 27; 25; 2 [IU]/1; [IU]/1; MG/1; [IU]/1; MG/1; MG/1; MG/1; MG/1; MG/1; MG/1; UG/1; MG/1; MG/1; MG/1; MG/1
1 TABLET ORAL DAILY
Status: DISCONTINUED | OUTPATIENT
Start: 2021-02-12 | End: 2021-02-13 | Stop reason: HOSPADM

## 2021-02-11 RX ORDER — METHYLERGONOVINE MALEATE 0.2 MG/ML
INJECTION INTRAVENOUS
Status: DISCONTINUED
Start: 2021-02-11 | End: 2021-02-11 | Stop reason: WASHOUT

## 2021-02-11 RX ORDER — CALCIUM CARBONATE 200(500)MG
500 TABLET,CHEWABLE ORAL 3 TIMES DAILY PRN
Status: DISCONTINUED | OUTPATIENT
Start: 2021-02-11 | End: 2021-02-11

## 2021-02-11 RX ORDER — DIPHENHYDRAMINE HCL 25 MG
25 CAPSULE ORAL EVERY 4 HOURS PRN
Status: DISCONTINUED | OUTPATIENT
Start: 2021-02-11 | End: 2021-02-13 | Stop reason: HOSPADM

## 2021-02-11 RX ORDER — FENTANYL CITRATE 50 UG/ML
INJECTION, SOLUTION INTRAMUSCULAR; INTRAVENOUS
Status: COMPLETED
Start: 2021-02-11 | End: 2021-02-11

## 2021-02-11 RX ORDER — SODIUM CITRATE AND CITRIC ACID MONOHYDRATE 334; 500 MG/5ML; MG/5ML
30 SOLUTION ORAL ONCE
Status: DISCONTINUED | OUTPATIENT
Start: 2021-02-11 | End: 2021-02-11

## 2021-02-11 RX ORDER — OXYTOCIN/RINGER'S LACTATE 30/500 ML
95 PLASTIC BAG, INJECTION (ML) INTRAVENOUS ONCE
Status: COMPLETED | OUTPATIENT
Start: 2021-02-11 | End: 2021-02-11

## 2021-02-11 RX ORDER — FAMOTIDINE 10 MG/ML
20 INJECTION INTRAVENOUS ONCE
Status: DISCONTINUED | OUTPATIENT
Start: 2021-02-11 | End: 2021-02-11

## 2021-02-11 RX ORDER — DOCUSATE SODIUM 100 MG/1
200 CAPSULE, LIQUID FILLED ORAL 2 TIMES DAILY PRN
Status: DISCONTINUED | OUTPATIENT
Start: 2021-02-11 | End: 2021-02-13 | Stop reason: HOSPADM

## 2021-02-11 RX ORDER — CARBOPROST TROMETHAMINE 250 UG/ML
INJECTION, SOLUTION INTRAMUSCULAR
Status: DISCONTINUED
Start: 2021-02-11 | End: 2021-02-11 | Stop reason: WASHOUT

## 2021-02-11 RX ORDER — IBUPROFEN 600 MG/1
600 TABLET ORAL EVERY 6 HOURS
Status: DISCONTINUED | OUTPATIENT
Start: 2021-02-11 | End: 2021-02-13 | Stop reason: HOSPADM

## 2021-02-11 RX ORDER — LIDOCAINE HYDROCHLORIDE AND EPINEPHRINE 15; 5 MG/ML; UG/ML
INJECTION, SOLUTION EPIDURAL
Status: DISCONTINUED | OUTPATIENT
Start: 2021-02-11 | End: 2021-02-11

## 2021-02-11 RX ORDER — OXYTOCIN/RINGER'S LACTATE 30/500 ML
95 PLASTIC BAG, INJECTION (ML) INTRAVENOUS ONCE
Status: DISCONTINUED | OUTPATIENT
Start: 2021-02-11 | End: 2021-02-11

## 2021-02-11 RX ORDER — DIPHENHYDRAMINE HYDROCHLORIDE 50 MG/ML
25 INJECTION INTRAMUSCULAR; INTRAVENOUS EVERY 4 HOURS PRN
Status: DISCONTINUED | OUTPATIENT
Start: 2021-02-11 | End: 2021-02-13 | Stop reason: HOSPADM

## 2021-02-11 RX ADMIN — Medication 334 MILLI-UNITS/MIN: at 02:02

## 2021-02-11 RX ADMIN — BUPIVACAINE HYDROCHLORIDE 4 ML: 2.5 INJECTION, SOLUTION INFILTRATION; PERINEURAL at 03:02

## 2021-02-11 RX ADMIN — LIDOCAINE HYDROCHLORIDE,EPINEPHRINE BITARTRATE 3 ML: 15; .005 INJECTION, SOLUTION EPIDURAL; INFILTRATION; INTRACAUDAL; PERINEURAL at 08:02

## 2021-02-11 RX ADMIN — SODIUM CHLORIDE, SODIUM LACTATE, POTASSIUM CHLORIDE, AND CALCIUM CHLORIDE 1000 ML: .6; .31; .03; .02 INJECTION, SOLUTION INTRAVENOUS at 07:02

## 2021-02-11 RX ADMIN — Medication 95 MILLI-UNITS/MIN: at 05:02

## 2021-02-11 RX ADMIN — IBUPROFEN 600 MG: 600 TABLET ORAL at 08:02

## 2021-02-11 RX ADMIN — Medication 5 ML: at 08:02

## 2021-02-11 RX ADMIN — Medication 10 ML/HR: at 08:02

## 2021-02-11 RX ADMIN — FENTANYL CITRATE 100 MCG: 50 INJECTION, SOLUTION INTRAMUSCULAR; INTRAVENOUS at 08:02

## 2021-02-11 RX ADMIN — Medication 2 MILLI-UNITS/MIN: at 08:02

## 2021-02-11 RX ADMIN — SODIUM CHLORIDE, SODIUM LACTATE, POTASSIUM CHLORIDE, AND CALCIUM CHLORIDE: .6; .31; .03; .02 INJECTION, SOLUTION INTRAVENOUS at 07:02

## 2021-02-11 RX ADMIN — HYDROCODONE BITARTRATE AND ACETAMINOPHEN 1 TABLET: 5; 325 TABLET ORAL at 10:02

## 2021-02-11 RX ADMIN — FENTANYL CITRATE 100 MCG: 50 INJECTION, SOLUTION INTRAMUSCULAR; INTRAVENOUS at 03:02

## 2021-02-12 PROBLEM — Z34.90 ENCOUNTER FOR ELECTIVE INDUCTION OF LABOR: Status: RESOLVED | Noted: 2021-02-11 | Resolved: 2021-02-12

## 2021-02-12 PROBLEM — D64.9 ANEMIA: Status: ACTIVE | Noted: 2021-02-12

## 2021-02-12 PROBLEM — Z34.90 TERM PREGNANCY: Status: RESOLVED | Noted: 2021-02-11 | Resolved: 2021-02-12

## 2021-02-12 LAB
BASOPHILS # BLD AUTO: 0.05 K/UL (ref 0–0.2)
BASOPHILS NFR BLD: 0.6 % (ref 0–1.9)
DIFFERENTIAL METHOD: ABNORMAL
EOSINOPHIL # BLD AUTO: 0.1 K/UL (ref 0–0.5)
EOSINOPHIL NFR BLD: 1.3 % (ref 0–8)
ERYTHROCYTE [DISTWIDTH] IN BLOOD BY AUTOMATED COUNT: 15 % (ref 11.5–14.5)
HCT VFR BLD AUTO: 27.1 % (ref 37–48.5)
HGB BLD-MCNC: 8.6 G/DL (ref 12–16)
IMM GRANULOCYTES # BLD AUTO: 0.06 K/UL (ref 0–0.04)
IMM GRANULOCYTES NFR BLD AUTO: 0.7 % (ref 0–0.5)
LYMPHOCYTES # BLD AUTO: 2.4 K/UL (ref 1–4.8)
LYMPHOCYTES NFR BLD: 28 % (ref 18–48)
MCH RBC QN AUTO: 25.2 PG (ref 27–31)
MCHC RBC AUTO-ENTMCNC: 31.7 G/DL (ref 32–36)
MCV RBC AUTO: 80 FL (ref 82–98)
MONOCYTES # BLD AUTO: 0.7 K/UL (ref 0.3–1)
MONOCYTES NFR BLD: 8.4 % (ref 4–15)
NEUTROPHILS # BLD AUTO: 5.1 K/UL (ref 1.8–7.7)
NEUTROPHILS NFR BLD: 61 % (ref 38–73)
NRBC BLD-RTO: 0 /100 WBC
PLATELET # BLD AUTO: 176 K/UL (ref 150–350)
PMV BLD AUTO: 12.2 FL (ref 9.2–12.9)
RBC # BLD AUTO: 3.41 M/UL (ref 4–5.4)
WBC # BLD AUTO: 8.42 K/UL (ref 3.9–12.7)

## 2021-02-12 PROCEDURE — 11000001 HC ACUTE MED/SURG PRIVATE ROOM

## 2021-02-12 PROCEDURE — 36415 COLL VENOUS BLD VENIPUNCTURE: CPT

## 2021-02-12 PROCEDURE — 85025 COMPLETE CBC W/AUTO DIFF WBC: CPT

## 2021-02-12 PROCEDURE — 25000003 PHARM REV CODE 250: Performed by: STUDENT IN AN ORGANIZED HEALTH CARE EDUCATION/TRAINING PROGRAM

## 2021-02-12 RX ORDER — FERROUS SULFATE 325(65) MG
325 TABLET, DELAYED RELEASE (ENTERIC COATED) ORAL DAILY
Qty: 30 TABLET | Refills: 1 | Status: SHIPPED | OUTPATIENT
Start: 2021-02-13 | End: 2022-02-07

## 2021-02-12 RX ORDER — DOCUSATE SODIUM 100 MG/1
100 CAPSULE, LIQUID FILLED ORAL 2 TIMES DAILY
Status: DISCONTINUED | OUTPATIENT
Start: 2021-02-12 | End: 2021-02-13 | Stop reason: HOSPADM

## 2021-02-12 RX ORDER — DOCUSATE SODIUM 100 MG/1
200 CAPSULE, LIQUID FILLED ORAL 2 TIMES DAILY PRN
Qty: 60 CAPSULE | Refills: 0 | Status: SHIPPED | OUTPATIENT
Start: 2021-02-12 | End: 2022-02-07

## 2021-02-12 RX ORDER — FERROUS SULFATE 325(65) MG
325 TABLET, DELAYED RELEASE (ENTERIC COATED) ORAL DAILY
Status: DISCONTINUED | OUTPATIENT
Start: 2021-02-12 | End: 2021-02-13 | Stop reason: HOSPADM

## 2021-02-12 RX ORDER — IBUPROFEN 600 MG/1
600 TABLET ORAL EVERY 6 HOURS
Qty: 30 TABLET | Refills: 1 | Status: SHIPPED | OUTPATIENT
Start: 2021-02-12 | End: 2022-02-07

## 2021-02-12 RX ORDER — OXYCODONE HYDROCHLORIDE 5 MG/1
5 TABLET ORAL EVERY 4 HOURS PRN
Qty: 20 TABLET | Refills: 0 | Status: SHIPPED | OUTPATIENT
Start: 2021-02-12 | End: 2022-02-07

## 2021-02-12 RX ADMIN — IBUPROFEN 600 MG: 600 TABLET ORAL at 08:02

## 2021-02-12 RX ADMIN — PRENATAL VIT W/ FE FUMARATE-FA TAB 27-0.8 MG 1 TABLET: 27-0.8 TAB at 09:02

## 2021-02-12 RX ADMIN — HYDROCODONE BITARTRATE AND ACETAMINOPHEN 1 TABLET: 5; 325 TABLET ORAL at 11:02

## 2021-02-12 RX ADMIN — DOCUSATE SODIUM 200 MG: 100 CAPSULE, LIQUID FILLED ORAL at 09:02

## 2021-02-12 RX ADMIN — DOCUSATE SODIUM 100 MG: 100 CAPSULE, LIQUID FILLED ORAL at 08:02

## 2021-02-12 RX ADMIN — HYDROCODONE BITARTRATE AND ACETAMINOPHEN 1 TABLET: 10; 325 TABLET ORAL at 08:02

## 2021-02-12 RX ADMIN — IBUPROFEN 600 MG: 600 TABLET ORAL at 09:02

## 2021-02-12 RX ADMIN — FERROUS SULFATE TAB EC 325 MG (65 MG FE EQUIVALENT) 325 MG: 325 (65 FE) TABLET DELAYED RESPONSE at 09:02

## 2021-02-13 VITALS
HEIGHT: 64 IN | RESPIRATION RATE: 18 BRPM | OXYGEN SATURATION: 99 % | BODY MASS INDEX: 34.55 KG/M2 | TEMPERATURE: 98 F | HEART RATE: 79 BPM | DIASTOLIC BLOOD PRESSURE: 60 MMHG | SYSTOLIC BLOOD PRESSURE: 101 MMHG | WEIGHT: 202.38 LBS

## 2021-02-13 PROCEDURE — 25000003 PHARM REV CODE 250: Performed by: STUDENT IN AN ORGANIZED HEALTH CARE EDUCATION/TRAINING PROGRAM

## 2021-02-13 RX ADMIN — PRENATAL VIT W/ FE FUMARATE-FA TAB 27-0.8 MG 1 TABLET: 27-0.8 TAB at 10:02

## 2021-02-13 RX ADMIN — DOCUSATE SODIUM 200 MG: 100 CAPSULE, LIQUID FILLED ORAL at 10:02

## 2021-02-13 RX ADMIN — HYDROCODONE BITARTRATE AND ACETAMINOPHEN 1 TABLET: 10; 325 TABLET ORAL at 10:02

## 2021-02-13 RX ADMIN — IBUPROFEN 600 MG: 600 TABLET ORAL at 10:02

## 2021-02-13 RX ADMIN — FERROUS SULFATE TAB EC 325 MG (65 MG FE EQUIVALENT) 325 MG: 325 (65 FE) TABLET DELAYED RESPONSE at 10:02

## 2021-03-04 ENCOUNTER — PATIENT MESSAGE (OUTPATIENT)
Dept: ADMINISTRATIVE | Facility: OTHER | Age: 31
End: 2021-03-04

## 2021-03-08 ENCOUNTER — TELEPHONE (OUTPATIENT)
Dept: OBSTETRICS AND GYNECOLOGY | Facility: CLINIC | Age: 31
End: 2021-03-08

## 2021-03-17 ENCOUNTER — TELEPHONE (OUTPATIENT)
Dept: OBSTETRICS AND GYNECOLOGY | Facility: CLINIC | Age: 31
End: 2021-03-17

## 2021-03-18 ENCOUNTER — PATIENT MESSAGE (OUTPATIENT)
Dept: OBSTETRICS AND GYNECOLOGY | Facility: CLINIC | Age: 31
End: 2021-03-18

## 2021-03-29 ENCOUNTER — POSTPARTUM VISIT (OUTPATIENT)
Dept: OBSTETRICS AND GYNECOLOGY | Facility: CLINIC | Age: 31
End: 2021-03-29
Attending: OBSTETRICS & GYNECOLOGY
Payer: COMMERCIAL

## 2021-03-29 VITALS
SYSTOLIC BLOOD PRESSURE: 106 MMHG | WEIGHT: 177.25 LBS | HEIGHT: 64 IN | DIASTOLIC BLOOD PRESSURE: 64 MMHG | BODY MASS INDEX: 30.26 KG/M2

## 2021-03-29 DIAGNOSIS — Z12.4 CERVICAL CANCER SCREENING: ICD-10-CM

## 2021-03-29 DIAGNOSIS — R10.2 PELVIC PAIN IN FEMALE: ICD-10-CM

## 2021-03-29 DIAGNOSIS — Z30.014 ENCOUNTER FOR INITIAL PRESCRIPTION OF INTRAUTERINE CONTRACEPTIVE DEVICE (IUD): ICD-10-CM

## 2021-03-29 PROCEDURE — 0503F PR POSTPARTUM CARE VISIT: ICD-10-PCS | Mod: ,,, | Performed by: OBSTETRICS & GYNECOLOGY

## 2021-03-29 PROCEDURE — 87624 HPV HI-RISK TYP POOLED RSLT: CPT | Performed by: OBSTETRICS & GYNECOLOGY

## 2021-03-29 PROCEDURE — 88175 CYTOPATH C/V AUTO FLUID REDO: CPT | Performed by: OBSTETRICS & GYNECOLOGY

## 2021-03-29 PROCEDURE — 0503F POSTPARTUM CARE VISIT: CPT | Mod: ,,, | Performed by: OBSTETRICS & GYNECOLOGY

## 2021-03-29 PROCEDURE — 99999 PR PBB SHADOW E&M-EST. PATIENT-LVL III: CPT | Mod: PBBFAC,,, | Performed by: OBSTETRICS & GYNECOLOGY

## 2021-03-29 PROCEDURE — 99999 PR PBB SHADOW E&M-EST. PATIENT-LVL III: ICD-10-PCS | Mod: PBBFAC,,, | Performed by: OBSTETRICS & GYNECOLOGY

## 2021-04-01 ENCOUNTER — PATIENT MESSAGE (OUTPATIENT)
Dept: OBSTETRICS AND GYNECOLOGY | Facility: CLINIC | Age: 31
End: 2021-04-01

## 2021-04-05 LAB
FINAL PATHOLOGIC DIAGNOSIS: NORMAL
Lab: NORMAL

## 2021-04-13 LAB
HPV HR 12 DNA SPEC QL NAA+PROBE: NEGATIVE
HPV16 AG SPEC QL: NEGATIVE
HPV18 DNA SPEC QL NAA+PROBE: NEGATIVE

## 2021-04-25 ENCOUNTER — PATIENT MESSAGE (OUTPATIENT)
Dept: REHABILITATION | Facility: OTHER | Age: 31
End: 2021-04-25

## 2021-04-26 ENCOUNTER — PATIENT MESSAGE (OUTPATIENT)
Dept: RESEARCH | Facility: HOSPITAL | Age: 31
End: 2021-04-26

## 2021-05-17 ENCOUNTER — PATIENT MESSAGE (OUTPATIENT)
Dept: OBSTETRICS AND GYNECOLOGY | Facility: CLINIC | Age: 31
End: 2021-05-17

## 2021-06-23 ENCOUNTER — PATIENT MESSAGE (OUTPATIENT)
Dept: OBSTETRICS AND GYNECOLOGY | Facility: CLINIC | Age: 31
End: 2021-06-23

## 2021-12-27 ENCOUNTER — OFFICE VISIT (OUTPATIENT)
Dept: URGENT CARE | Facility: CLINIC | Age: 31
End: 2021-12-27
Payer: COMMERCIAL

## 2021-12-27 ENCOUNTER — PATIENT MESSAGE (OUTPATIENT)
Dept: ADMINISTRATIVE | Facility: OTHER | Age: 31
End: 2021-12-27
Payer: COMMERCIAL

## 2021-12-27 VITALS
OXYGEN SATURATION: 99 % | BODY MASS INDEX: 30.22 KG/M2 | HEART RATE: 84 BPM | HEIGHT: 64 IN | WEIGHT: 177 LBS | RESPIRATION RATE: 18 BRPM | DIASTOLIC BLOOD PRESSURE: 80 MMHG | SYSTOLIC BLOOD PRESSURE: 124 MMHG | TEMPERATURE: 99 F

## 2021-12-27 DIAGNOSIS — Z11.59 ENCOUNTER FOR SCREENING FOR VIRAL DISEASE: ICD-10-CM

## 2021-12-27 DIAGNOSIS — U07.1 COVID-19: Primary | ICD-10-CM

## 2021-12-27 LAB
CTP QC/QA: YES
SARS-COV-2 RDRP RESP QL NAA+PROBE: POSITIVE

## 2021-12-27 PROCEDURE — 3074F SYST BP LT 130 MM HG: CPT | Mod: CPTII,S$GLB,, | Performed by: NURSE PRACTITIONER

## 2021-12-27 PROCEDURE — 99203 PR OFFICE/OUTPT VISIT, NEW, LEVL III, 30-44 MIN: ICD-10-PCS | Mod: S$GLB,,, | Performed by: NURSE PRACTITIONER

## 2021-12-27 PROCEDURE — 3079F DIAST BP 80-89 MM HG: CPT | Mod: CPTII,S$GLB,, | Performed by: NURSE PRACTITIONER

## 2021-12-27 PROCEDURE — 99203 OFFICE O/P NEW LOW 30 MIN: CPT | Mod: S$GLB,,, | Performed by: NURSE PRACTITIONER

## 2021-12-27 PROCEDURE — 1159F MED LIST DOCD IN RCRD: CPT | Mod: CPTII,S$GLB,, | Performed by: NURSE PRACTITIONER

## 2021-12-27 PROCEDURE — 1160F RVW MEDS BY RX/DR IN RCRD: CPT | Mod: CPTII,S$GLB,, | Performed by: NURSE PRACTITIONER

## 2021-12-27 PROCEDURE — 3008F BODY MASS INDEX DOCD: CPT | Mod: CPTII,S$GLB,, | Performed by: NURSE PRACTITIONER

## 2021-12-27 PROCEDURE — U0002 COVID-19 LAB TEST NON-CDC: HCPCS | Mod: QW,S$GLB,, | Performed by: NURSE PRACTITIONER

## 2021-12-27 PROCEDURE — 1160F PR REVIEW ALL MEDS BY PRESCRIBER/CLIN PHARMACIST DOCUMENTED: ICD-10-PCS | Mod: CPTII,S$GLB,, | Performed by: NURSE PRACTITIONER

## 2021-12-27 PROCEDURE — 3008F PR BODY MASS INDEX (BMI) DOCUMENTED: ICD-10-PCS | Mod: CPTII,S$GLB,, | Performed by: NURSE PRACTITIONER

## 2021-12-27 PROCEDURE — 1159F PR MEDICATION LIST DOCUMENTED IN MEDICAL RECORD: ICD-10-PCS | Mod: CPTII,S$GLB,, | Performed by: NURSE PRACTITIONER

## 2021-12-27 PROCEDURE — 3079F PR MOST RECENT DIASTOLIC BLOOD PRESSURE 80-89 MM HG: ICD-10-PCS | Mod: CPTII,S$GLB,, | Performed by: NURSE PRACTITIONER

## 2021-12-27 PROCEDURE — 3074F PR MOST RECENT SYSTOLIC BLOOD PRESSURE < 130 MM HG: ICD-10-PCS | Mod: CPTII,S$GLB,, | Performed by: NURSE PRACTITIONER

## 2021-12-27 PROCEDURE — U0002: ICD-10-PCS | Mod: QW,S$GLB,, | Performed by: NURSE PRACTITIONER

## 2022-02-07 ENCOUNTER — OFFICE VISIT (OUTPATIENT)
Dept: INTERNAL MEDICINE | Facility: CLINIC | Age: 32
End: 2022-02-07
Payer: COMMERCIAL

## 2022-02-07 VITALS
HEART RATE: 86 BPM | OXYGEN SATURATION: 99 % | WEIGHT: 189.63 LBS | BODY MASS INDEX: 32.37 KG/M2 | SYSTOLIC BLOOD PRESSURE: 124 MMHG | DIASTOLIC BLOOD PRESSURE: 76 MMHG | HEIGHT: 64 IN

## 2022-02-07 DIAGNOSIS — Z00.00 ANNUAL PHYSICAL EXAM: Primary | ICD-10-CM

## 2022-02-07 DIAGNOSIS — T14.8XXA BRUISING: ICD-10-CM

## 2022-02-07 PROCEDURE — 99213 OFFICE O/P EST LOW 20 MIN: CPT | Mod: S$GLB,,, | Performed by: STUDENT IN AN ORGANIZED HEALTH CARE EDUCATION/TRAINING PROGRAM

## 2022-02-07 PROCEDURE — 99999 PR PBB SHADOW E&M-EST. PATIENT-LVL III: CPT | Mod: PBBFAC,,, | Performed by: STUDENT IN AN ORGANIZED HEALTH CARE EDUCATION/TRAINING PROGRAM

## 2022-02-07 PROCEDURE — 99999 PR PBB SHADOW E&M-EST. PATIENT-LVL III: ICD-10-PCS | Mod: PBBFAC,,, | Performed by: STUDENT IN AN ORGANIZED HEALTH CARE EDUCATION/TRAINING PROGRAM

## 2022-02-07 PROCEDURE — 99213 PR OFFICE/OUTPT VISIT, EST, LEVL III, 20-29 MIN: ICD-10-PCS | Mod: S$GLB,,, | Performed by: STUDENT IN AN ORGANIZED HEALTH CARE EDUCATION/TRAINING PROGRAM

## 2022-02-07 NOTE — PROGRESS NOTES
[facility] PROGRESS NOTE    Name: Maryan Grady  : 1990  Date of Service: 2022   PCP: Daniel Iyer MD    Reason for visit:   Chief Complaint   Patient presents with    Annual Exam       HPI: Maryan Grady is a 31 y.o. female who presents to clinic to establish care. Patient states that she is in generally good health. She is not on any active medications at this time. Patient does describe a 2 week history of intermittent bruising of her right leg. She states that she felt that her leg was sore and noted the bruises. Patient also states that she previously had bleeding of her gums but it had resolved approximately a month ago. She is 1 year post partum and continues to breastfeed currently. She has irregular menses - most recently required most pads than usual. Patient does note that she recently recovered from a COVID-19 infection approximately 1 month ago. She denies fever, chills, nausea, vomiting, changes in bowel or bladder, night sweats or weight loss.    Medications: No current outpatient medications on file.     Review of Systems:   Review of Systems   Constitutional: Negative for chills, diaphoresis, fever, malaise/fatigue and weight loss.   HENT: Negative for congestion, hearing loss, nosebleeds, sinus pain, sore throat and tinnitus.    Eyes: Negative for blurred vision, pain, discharge and redness.   Respiratory: Negative for cough, hemoptysis, sputum production, shortness of breath and wheezing.    Cardiovascular: Negative for chest pain, palpitations, orthopnea, claudication and leg swelling.   Gastrointestinal: Negative for abdominal pain, blood in stool, constipation, diarrhea, heartburn, melena, nausea and vomiting.   Genitourinary: Negative for dysuria, frequency and urgency.   Musculoskeletal: Negative for back pain, joint pain, myalgias and neck pain.   Skin: Negative for itching and rash.   Neurological: Negative for dizziness, tremors, speech change, focal weakness,  "seizures, weakness and headaches.   Endo/Heme/Allergies: Negative for environmental allergies and polydipsia. Bruises/bleeds easily.       Vitals:   Vitals:    02/07/22 1504   BP: 124/76   BP Location: Left arm   Patient Position: Sitting   BP Method: Large (Manual)   Pulse: 86   SpO2: 99%   Weight: 86 kg (189 lb 9.5 oz)   Height: 5' 4" (1.626 m)     Body mass index is 32.54 kg/m².    Physical Exam:   Physical Exam  Constitutional:       General: She is not in acute distress.     Appearance: Normal appearance. She is normal weight. She is not ill-appearing, toxic-appearing or diaphoretic.   HENT:      Head: Normocephalic and atraumatic.      Nose: No congestion or rhinorrhea.   Eyes:      General: No scleral icterus.        Right eye: No discharge.         Left eye: No discharge.   Cardiovascular:      Rate and Rhythm: Normal rate and regular rhythm.      Pulses: Normal pulses.      Heart sounds: Normal heart sounds. No murmur heard.  No friction rub. No gallop.    Pulmonary:      Effort: Pulmonary effort is normal. No respiratory distress.      Breath sounds: Normal breath sounds. No stridor. No wheezing, rhonchi or rales.   Chest:      Chest wall: No tenderness.   Abdominal:      General: Abdomen is flat. Bowel sounds are normal. There is no distension.      Palpations: Abdomen is soft. There is no mass.      Tenderness: There is no abdominal tenderness. There is no guarding.   Musculoskeletal:      Comments: Right leg with multiple bruises are various stages of resolution   Skin:     General: Skin is warm and dry.      Coloration: Skin is not jaundiced.   Neurological:      General: No focal deficit present.      Mental Status: She is alert and oriented to person, place, and time.         Labs: Previous labs reviewed.    Imaging: Previous imaging reviewed.     Assessment/Plan:   Annual physical exam  -     CBC Auto Differential; Future; Expected date: 02/07/2022    Bruising  Given recent COVID-19 infection some " concern for ITP. Plan for CBC and coagulation studies to evaluate further.  -     CBC Auto Differential; Future; Expected date: 02/07/2022  -     Protime-INR; Future; Expected date: 02/07/2022  -     APTT; Future; Expected date: 02/07/2022      Discussed with Dr. Amado

## 2022-02-07 NOTE — PATIENT INSTRUCTIONS
- labs ordered  - follow up as needed  - please go to ER if you develop severe headache, persistent bleeding, shortness of breath, fatigue

## 2022-02-21 ENCOUNTER — LAB VISIT (OUTPATIENT)
Dept: LAB | Facility: OTHER | Age: 32
End: 2022-02-21
Attending: STUDENT IN AN ORGANIZED HEALTH CARE EDUCATION/TRAINING PROGRAM
Payer: COMMERCIAL

## 2022-02-21 DIAGNOSIS — T14.8XXA BRUISING: ICD-10-CM

## 2022-02-21 DIAGNOSIS — Z00.00 ANNUAL PHYSICAL EXAM: ICD-10-CM

## 2022-02-21 LAB
APTT BLDCRRT: 29.9 SEC (ref 21–32)
BASOPHILS # BLD AUTO: 0.05 K/UL (ref 0–0.2)
BASOPHILS NFR BLD: 0.9 % (ref 0–1.9)
DIFFERENTIAL METHOD: ABNORMAL
EOSINOPHIL # BLD AUTO: 0.1 K/UL (ref 0–0.5)
EOSINOPHIL NFR BLD: 2.1 % (ref 0–8)
ERYTHROCYTE [DISTWIDTH] IN BLOOD BY AUTOMATED COUNT: 13.7 % (ref 11.5–14.5)
HCT VFR BLD AUTO: 34.6 % (ref 37–48.5)
HGB BLD-MCNC: 11.3 G/DL (ref 12–16)
IMM GRANULOCYTES # BLD AUTO: 0.02 K/UL (ref 0–0.04)
IMM GRANULOCYTES NFR BLD AUTO: 0.4 % (ref 0–0.5)
INR PPP: 1 (ref 0.8–1.2)
LYMPHOCYTES # BLD AUTO: 2.4 K/UL (ref 1–4.8)
LYMPHOCYTES NFR BLD: 45.7 % (ref 18–48)
MCH RBC QN AUTO: 28 PG (ref 27–31)
MCHC RBC AUTO-ENTMCNC: 32.7 G/DL (ref 32–36)
MCV RBC AUTO: 86 FL (ref 82–98)
MONOCYTES # BLD AUTO: 0.3 K/UL (ref 0.3–1)
MONOCYTES NFR BLD: 4.7 % (ref 4–15)
NEUTROPHILS # BLD AUTO: 2.4 K/UL (ref 1.8–7.7)
NEUTROPHILS NFR BLD: 46.2 % (ref 38–73)
NRBC BLD-RTO: 0 /100 WBC
PLATELET # BLD AUTO: 280 K/UL (ref 150–450)
PMV BLD AUTO: 10.4 FL (ref 9.2–12.9)
PROTHROMBIN TIME: 10.7 SEC (ref 9–12.5)
RBC # BLD AUTO: 4.03 M/UL (ref 4–5.4)
WBC # BLD AUTO: 5.29 K/UL (ref 3.9–12.7)

## 2022-02-21 PROCEDURE — 85610 PROTHROMBIN TIME: CPT | Performed by: STUDENT IN AN ORGANIZED HEALTH CARE EDUCATION/TRAINING PROGRAM

## 2022-02-21 PROCEDURE — 85025 COMPLETE CBC W/AUTO DIFF WBC: CPT | Performed by: STUDENT IN AN ORGANIZED HEALTH CARE EDUCATION/TRAINING PROGRAM

## 2022-02-21 PROCEDURE — 36415 COLL VENOUS BLD VENIPUNCTURE: CPT | Performed by: STUDENT IN AN ORGANIZED HEALTH CARE EDUCATION/TRAINING PROGRAM

## 2022-02-21 PROCEDURE — 85730 THROMBOPLASTIN TIME PARTIAL: CPT | Performed by: STUDENT IN AN ORGANIZED HEALTH CARE EDUCATION/TRAINING PROGRAM

## 2022-03-29 ENCOUNTER — PATIENT MESSAGE (OUTPATIENT)
Dept: RESEARCH | Facility: HOSPITAL | Age: 32
End: 2022-03-29
Payer: COMMERCIAL

## 2022-04-13 ENCOUNTER — LAB VISIT (OUTPATIENT)
Dept: LAB | Facility: HOSPITAL | Age: 32
End: 2022-04-13
Attending: INTERNAL MEDICINE
Payer: COMMERCIAL

## 2022-04-13 ENCOUNTER — OFFICE VISIT (OUTPATIENT)
Dept: PRIMARY CARE CLINIC | Facility: CLINIC | Age: 32
End: 2022-04-13
Payer: COMMERCIAL

## 2022-04-13 VITALS
BODY MASS INDEX: 32.48 KG/M2 | DIASTOLIC BLOOD PRESSURE: 64 MMHG | SYSTOLIC BLOOD PRESSURE: 94 MMHG | HEIGHT: 64 IN | OXYGEN SATURATION: 99 % | HEART RATE: 74 BPM | WEIGHT: 190.25 LBS | TEMPERATURE: 98 F

## 2022-04-13 DIAGNOSIS — E04.9 THYROID ENLARGEMENT: Primary | ICD-10-CM

## 2022-04-13 DIAGNOSIS — Z01.818 PREOPERATIVE CLEARANCE: ICD-10-CM

## 2022-04-13 DIAGNOSIS — E04.9 THYROID ENLARGEMENT: ICD-10-CM

## 2022-04-13 DIAGNOSIS — Z20.828 MONO EXPOSURE: ICD-10-CM

## 2022-04-13 LAB
T3 SERPL-MCNC: 98 NG/DL (ref 60–180)
T4 FREE SERPL-MCNC: 0.97 NG/DL (ref 0.71–1.51)
TSH SERPL DL<=0.005 MIU/L-ACNC: 1.67 UIU/ML (ref 0.4–4)

## 2022-04-13 PROCEDURE — 1159F PR MEDICATION LIST DOCUMENTED IN MEDICAL RECORD: ICD-10-PCS | Mod: CPTII,S$GLB,, | Performed by: INTERNAL MEDICINE

## 2022-04-13 PROCEDURE — 36415 COLL VENOUS BLD VENIPUNCTURE: CPT | Mod: PN | Performed by: INTERNAL MEDICINE

## 2022-04-13 PROCEDURE — 1160F PR REVIEW ALL MEDS BY PRESCRIBER/CLIN PHARMACIST DOCUMENTED: ICD-10-PCS | Mod: CPTII,S$GLB,, | Performed by: INTERNAL MEDICINE

## 2022-04-13 PROCEDURE — 84439 ASSAY OF FREE THYROXINE: CPT | Performed by: INTERNAL MEDICINE

## 2022-04-13 PROCEDURE — 99214 OFFICE O/P EST MOD 30 MIN: CPT | Mod: S$GLB,,, | Performed by: INTERNAL MEDICINE

## 2022-04-13 PROCEDURE — 99999 PR PBB SHADOW E&M-EST. PATIENT-LVL IV: CPT | Mod: PBBFAC,,, | Performed by: INTERNAL MEDICINE

## 2022-04-13 PROCEDURE — 3074F PR MOST RECENT SYSTOLIC BLOOD PRESSURE < 130 MM HG: ICD-10-PCS | Mod: CPTII,S$GLB,, | Performed by: INTERNAL MEDICINE

## 2022-04-13 PROCEDURE — 99999 PR PBB SHADOW E&M-EST. PATIENT-LVL IV: ICD-10-PCS | Mod: PBBFAC,,, | Performed by: INTERNAL MEDICINE

## 2022-04-13 PROCEDURE — 99214 PR OFFICE/OUTPT VISIT, EST, LEVL IV, 30-39 MIN: ICD-10-PCS | Mod: S$GLB,,, | Performed by: INTERNAL MEDICINE

## 2022-04-13 PROCEDURE — 84443 ASSAY THYROID STIM HORMONE: CPT | Performed by: INTERNAL MEDICINE

## 2022-04-13 PROCEDURE — 84480 ASSAY TRIIODOTHYRONINE (T3): CPT | Performed by: INTERNAL MEDICINE

## 2022-04-13 PROCEDURE — 1160F RVW MEDS BY RX/DR IN RCRD: CPT | Mod: CPTII,S$GLB,, | Performed by: INTERNAL MEDICINE

## 2022-04-13 PROCEDURE — 1159F MED LIST DOCD IN RCRD: CPT | Mod: CPTII,S$GLB,, | Performed by: INTERNAL MEDICINE

## 2022-04-13 PROCEDURE — 3078F DIAST BP <80 MM HG: CPT | Mod: CPTII,S$GLB,, | Performed by: INTERNAL MEDICINE

## 2022-04-13 PROCEDURE — 3008F PR BODY MASS INDEX (BMI) DOCUMENTED: ICD-10-PCS | Mod: CPTII,S$GLB,, | Performed by: INTERNAL MEDICINE

## 2022-04-13 PROCEDURE — 3078F PR MOST RECENT DIASTOLIC BLOOD PRESSURE < 80 MM HG: ICD-10-PCS | Mod: CPTII,S$GLB,, | Performed by: INTERNAL MEDICINE

## 2022-04-13 PROCEDURE — 3074F SYST BP LT 130 MM HG: CPT | Mod: CPTII,S$GLB,, | Performed by: INTERNAL MEDICINE

## 2022-04-13 PROCEDURE — 3008F BODY MASS INDEX DOCD: CPT | Mod: CPTII,S$GLB,, | Performed by: INTERNAL MEDICINE

## 2022-04-13 NOTE — PATIENT INSTRUCTIONS
8 weeks follow up for well visit or sooner if needed.     This is a reminder to be on time for your appointments. When I see patients, I want to be fair and give my time equally to all of my patients. We have 20 minutes together, and 10 minutes of that time is usually spent on rooming. Even small delays can make a huge difference in your visit so I advise you to check-in 10 minutes before your appointment time. This ensures that I can spend as much time with you as possible instead of needing to cut your visit short. Thank you for your consideration.

## 2022-04-13 NOTE — PROGRESS NOTES
Subjective:      Patient ID: Maryan Grady is a 31 y.o. female.    Chief Complaint: Mass    Here today for general exam.     Lump on throat: She tells me that she noticed a lump on her throat several days ago. She thinks it may have been around for longer. She does reports weight gain. Checking her previous office note from 2 months ago does not reveal significant weight change. No fevers/chills. Does report some night sweats. No viral illness recently. No seasonal allergies as she is aware. Denies pain, denies difficulty swallowing. Denies SOB. She does have a hx of hypothyroidism in her mother's side of family.     Denies any chest pain, shortness of breath, nausea vomiting constipation diarrhea, blood in stool, heartburn    Review of Systems   Constitutional: Positive for malaise/fatigue. Negative for chills, fever and weight loss.   HENT: Negative for congestion, ear pain and sore throat.    Eyes: Negative for double vision.   Respiratory: Negative for cough and shortness of breath.    Cardiovascular: Negative for chest pain, palpitations and leg swelling.   Gastrointestinal: Negative for abdominal pain, heartburn, nausea and vomiting.   Skin: Negative for rash.   Neurological: Negative for dizziness, tingling and headaches.   Psychiatric/Behavioral: Negative for depression.       No current outpatient medications on file.    No results found for: HGBA1C  No results found for: MICALBCREAT  No results found for: LDLCALC, CHOL, HDL, TRIG    Lab Results   Component Value Date     (L) 08/06/2020    K 4.0 08/06/2020     08/06/2020    CO2 20 (L) 08/06/2020     (H) 08/06/2020    BUN 11 08/06/2020    CREATININE 0.7 08/06/2020    CALCIUM 9.3 08/06/2020    ANIONGAP 9 08/06/2020    ESTGFRAFRICA >60 08/06/2020    EGFRNONAA >60 08/06/2020    WBC 5.29 02/21/2022    HGB 11.3 (L) 02/21/2022    HGB 8.6 (L) 02/12/2021    HCT 34.6 (L) 02/21/2022    MCV 86 02/21/2022     02/21/2022       No results found  "for: LH, FSH, TOTALTESTOST, PROGESTERONE, ESTRADIOL, TAHQHMFW07TT, PWKSTEJQ27, FERRITIN, IRON, TRANSFERRIN, TIBC, FESATURATED, ZINC      Past Medical History:   Diagnosis Date    GERD (gastroesophageal reflux disease)      Past Surgical History:   Procedure Laterality Date    EYE SURGERY      strabismus     Social History     Social History Narrative    Not on file     Family History   Problem Relation Age of Onset    Hypothyroidism Mother     Hypertension Father     Melanoma Neg Hx     Breast cancer Neg Hx     Colon cancer Neg Hx     Ovarian cancer Neg Hx      Vitals:    04/13/22 1038   BP: 94/64   Pulse: 74   Temp: 98.1 °F (36.7 °C)   SpO2: 99%   Weight: 86.3 kg (190 lb 4.1 oz)   Height: 5' 4" (1.626 m)   PainSc:   1     Objective:   Physical Exam  Vitals reviewed.   Constitutional:       Appearance: Normal appearance.   HENT:      Head: Normocephalic.      Right Ear: Tympanic membrane, ear canal and external ear normal.      Left Ear: Tympanic membrane, ear canal and external ear normal.      Nose: Nose normal.      Mouth/Throat:      Mouth: Mucous membranes are moist.      Pharynx: Oropharynx is clear.   Eyes:      Conjunctiva/sclera: Conjunctivae normal.      Pupils: Pupils are equal, round, and reactive to light.   Neck:        Comments: Enlargement noted on neck, right side more enlarged than left. Painless.   Cardiovascular:      Rate and Rhythm: Normal rate and regular rhythm.      Pulses: Normal pulses.   Pulmonary:      Effort: Pulmonary effort is normal.      Breath sounds: Normal breath sounds.   Abdominal:      General: Abdomen is flat. Bowel sounds are normal.      Palpations: Abdomen is soft.   Musculoskeletal:      Cervical back: Neck supple.   Skin:     General: Skin is warm.   Neurological:      General: No focal deficit present.      Mental Status: She is alert.   Psychiatric:         Mood and Affect: Mood normal.       Assessment:     1. Thyroid enlargement      Plan:     Orders Placed " This Encounter    T3    T4, Free    TSH       Patient Instructions   1. 8 weeks follow up for well visit or sooner if needed.     This is a reminder to be on time for your appointments. When I see patients, I want to be fair and give my time equally to all of my patients. We have 20 minutes together, and 10 minutes of that time is usually spent on rooming. Even small delays can make a huge difference in your visit so I advise you to check-in 10 minutes before your appointment time. This ensures that I can spend as much time with you as possible instead of needing to cut your visit short. Thank you for your consideration.

## 2022-04-14 ENCOUNTER — PATIENT MESSAGE (OUTPATIENT)
Dept: PRIMARY CARE CLINIC | Facility: CLINIC | Age: 32
End: 2022-04-14
Payer: COMMERCIAL

## 2022-04-14 DIAGNOSIS — E04.9 ENLARGED THYROID: ICD-10-CM

## 2022-04-14 DIAGNOSIS — R22.1 LUMP IN THROAT: Primary | ICD-10-CM

## 2022-04-14 NOTE — TELEPHONE ENCOUNTER
Ok thank you. So would you say in that regard Im good to try for my surgery? Also in about how long should contact you if the swelling is not down? Do I schedule a appointment or message you first?       Please advise

## 2022-04-18 ENCOUNTER — PATIENT MESSAGE (OUTPATIENT)
Dept: PRIMARY CARE CLINIC | Facility: CLINIC | Age: 32
End: 2022-04-18

## 2022-04-18 ENCOUNTER — LAB VISIT (OUTPATIENT)
Dept: LAB | Facility: HOSPITAL | Age: 32
End: 2022-04-18
Attending: INTERNAL MEDICINE
Payer: COMMERCIAL

## 2022-04-18 ENCOUNTER — CLINICAL SUPPORT (OUTPATIENT)
Dept: PRIMARY CARE CLINIC | Facility: CLINIC | Age: 32
End: 2022-04-18
Payer: COMMERCIAL

## 2022-04-18 ENCOUNTER — TELEPHONE (OUTPATIENT)
Dept: PRIMARY CARE CLINIC | Facility: CLINIC | Age: 32
End: 2022-04-18

## 2022-04-18 ENCOUNTER — OFFICE VISIT (OUTPATIENT)
Dept: PRIMARY CARE CLINIC | Facility: CLINIC | Age: 32
End: 2022-04-18
Payer: COMMERCIAL

## 2022-04-18 VITALS — TEMPERATURE: 100 F

## 2022-04-18 DIAGNOSIS — J02.9 SORE THROAT: Primary | ICD-10-CM

## 2022-04-18 DIAGNOSIS — R05.9 COUGH: ICD-10-CM

## 2022-04-18 DIAGNOSIS — Z20.828 MONO EXPOSURE: ICD-10-CM

## 2022-04-18 DIAGNOSIS — J02.9 SORE THROAT: ICD-10-CM

## 2022-04-18 DIAGNOSIS — J02.9 ACUTE PHARYNGITIS, UNSPECIFIED ETIOLOGY: Primary | ICD-10-CM

## 2022-04-18 DIAGNOSIS — J02.9 ACUTE PHARYNGITIS, UNSPECIFIED ETIOLOGY: ICD-10-CM

## 2022-04-18 DIAGNOSIS — M54.2 NECK PAIN: ICD-10-CM

## 2022-04-18 DIAGNOSIS — R50.9 FEVER, UNSPECIFIED FEVER CAUSE: ICD-10-CM

## 2022-04-18 DIAGNOSIS — R05.9 COUGH: Primary | ICD-10-CM

## 2022-04-18 DIAGNOSIS — R68.83 CHILLS: ICD-10-CM

## 2022-04-18 LAB
CTP QC/QA: YES
HETEROPH AB SERPL QL IA: NEGATIVE
POC MOLECULAR INFLUENZA A AGN: POSITIVE
POC MOLECULAR INFLUENZA B AGN: NEGATIVE
S PYO RRNA THROAT QL PROBE: NEGATIVE
SARS-COV-2 RDRP RESP QL NAA+PROBE: NEGATIVE

## 2022-04-18 PROCEDURE — 1159F PR MEDICATION LIST DOCUMENTED IN MEDICAL RECORD: ICD-10-PCS | Mod: CPTII,95,, | Performed by: NURSE PRACTITIONER

## 2022-04-18 PROCEDURE — 87081 CULTURE SCREEN ONLY: CPT | Performed by: INTERNAL MEDICINE

## 2022-04-18 PROCEDURE — 87880 STREP A ASSAY W/OPTIC: CPT | Mod: QW,S$GLB,, | Performed by: INTERNAL MEDICINE

## 2022-04-18 PROCEDURE — 1159F MED LIST DOCD IN RCRD: CPT | Mod: CPTII,95,, | Performed by: NURSE PRACTITIONER

## 2022-04-18 PROCEDURE — U0002 COVID-19 LAB TEST NON-CDC: HCPCS | Mod: QW,S$GLB,, | Performed by: INTERNAL MEDICINE

## 2022-04-18 PROCEDURE — 99999 PR PBB SHADOW E&M-EST. PATIENT-LVL II: ICD-10-PCS | Mod: PBBFAC,,,

## 2022-04-18 PROCEDURE — 87502 INFLUENZA DNA AMP PROBE: CPT | Mod: QW,S$GLB,, | Performed by: INTERNAL MEDICINE

## 2022-04-18 PROCEDURE — U0002: ICD-10-PCS | Mod: QW,S$GLB,, | Performed by: INTERNAL MEDICINE

## 2022-04-18 PROCEDURE — 86308 HETEROPHILE ANTIBODY SCREEN: CPT | Performed by: INTERNAL MEDICINE

## 2022-04-18 PROCEDURE — 1160F RVW MEDS BY RX/DR IN RCRD: CPT | Mod: CPTII,95,, | Performed by: NURSE PRACTITIONER

## 2022-04-18 PROCEDURE — 1160F PR REVIEW ALL MEDS BY PRESCRIBER/CLIN PHARMACIST DOCUMENTED: ICD-10-PCS | Mod: CPTII,95,, | Performed by: NURSE PRACTITIONER

## 2022-04-18 PROCEDURE — 99999 PR PBB SHADOW E&M-EST. PATIENT-LVL II: CPT | Mod: PBBFAC,,,

## 2022-04-18 PROCEDURE — 87502 POCT INFLUENZA A/B MOLECULAR: ICD-10-PCS | Mod: QW,S$GLB,, | Performed by: INTERNAL MEDICINE

## 2022-04-18 PROCEDURE — 99213 OFFICE O/P EST LOW 20 MIN: CPT | Mod: 95,,, | Performed by: NURSE PRACTITIONER

## 2022-04-18 PROCEDURE — 36415 COLL VENOUS BLD VENIPUNCTURE: CPT | Mod: PN | Performed by: INTERNAL MEDICINE

## 2022-04-18 PROCEDURE — 99213 PR OFFICE/OUTPT VISIT, EST, LEVL III, 20-29 MIN: ICD-10-PCS | Mod: 95,,, | Performed by: NURSE PRACTITIONER

## 2022-04-18 PROCEDURE — 87880 POCT RAPID STREP A: ICD-10-PCS | Mod: QW,S$GLB,, | Performed by: INTERNAL MEDICINE

## 2022-04-18 RX ORDER — FLUTICASONE PROPIONATE 50 MCG
1 SPRAY, SUSPENSION (ML) NASAL DAILY
Qty: 16 G | Refills: 1 | Status: SHIPPED | OUTPATIENT
Start: 2022-04-18 | End: 2022-04-19

## 2022-04-18 RX ORDER — BENZONATATE 200 MG/1
200 CAPSULE ORAL 3 TIMES DAILY PRN
Qty: 30 CAPSULE | Refills: 0 | Status: SHIPPED | OUTPATIENT
Start: 2022-04-18 | End: 2022-04-28

## 2022-04-18 RX ORDER — IBUPROFEN 800 MG/1
800 TABLET ORAL EVERY 6 HOURS PRN
Qty: 4 TABLET | Refills: 0 | Status: ON HOLD | OUTPATIENT
Start: 2022-04-18 | End: 2023-04-11 | Stop reason: HOSPADM

## 2022-04-18 NOTE — PROGRESS NOTES
The patient location is: [place].  The chief complaint leading to consultation is: sore throat    Visit type: audiovisual    Face to Face time with patient: 10  20 minutes of total time spent on the encounter, which includes face to face time and non-face to face time preparing to see the patient (eg, review of tests), Obtaining and/or reviewing separately obtained history, Documenting clinical information in the electronic or other health record, Independently interpreting results (not separately reported) and communicating results to the patient/family/caregiver, or Care coordination (not separately reported).         Each patient to whom he or she provides medical services by telemedicine is:  (1) informed of the relationship between the physician and patient and the respective role of any other health care provider with respect to management of the patient; and (2) notified that he or she may decline to receive medical services by telemedicine and may withdraw from such care at any time.    Notes:   Subjective:       Patient ID: Maryan Grady is a 31 y.o. female.    Chief Complaint: Sore Throat    Ms. Maryan Grady is a 31 year old female patient, new to me,  presents for telemedicine visit for sore throat. PCP is Tita Gonzalez. Medical and surgical history in addition to problem list reviewed as listed below.    Patient presents with complaints of sore throat, coughing, expectorant of white, thick mucous as of April 13, 2022. Cough drops with no relief. Occasionally, there is pain with swallowing.      Past Medical History:   Diagnosis Date    GERD (gastroesophageal reflux disease)         Past Surgical History:   Procedure Laterality Date    EYE SURGERY      strabismus        Family History   Problem Relation Age of Onset    Hypothyroidism Mother     Hypertension Father     Melanoma Neg Hx     Breast cancer Neg Hx     Colon cancer Neg Hx     Ovarian cancer Neg Hx        Social History      Tobacco Use   Smoking Status Never Smoker   Smokeless Tobacco Never Used       Social History     Social History Narrative    Not on file       Review of patient's allergies indicates:   Allergen Reactions    Penicillins Rash        Review of Systems   Constitutional: Negative for chills, diaphoresis and fever.   HENT: Positive for sore throat and trouble swallowing.    Eyes: Negative.    Respiratory: Negative.    Gastrointestinal: Negative.    Endocrine: Negative.    Musculoskeletal: Negative.    Neurological: Negative.    Psychiatric/Behavioral: Negative.          Objective:        There were no vitals filed for this visit.     Physical Exam  Constitutional:       Appearance: Normal appearance.   Neurological:      General: No focal deficit present.      Mental Status: She is alert and oriented to person, place, and time.   Psychiatric:         Mood and Affect: Mood normal.         Behavior: Behavior normal.         Assessment:       1. Acute pharyngitis, unspecified etiology    2. Neck pain        Plan:       Acute pharyngitis, unspecified etiology  -     Strep A culture, throat; Future; Expected date: 04/18/2022  -     POCT Influenza A/B; Future; Expected date: 04/18/2022  -     ibuprofen (ADVIL,MOTRIN) 800 MG tablet; Take 1 tablet (800 mg total) by mouth every 6 (six) hours as needed for Pain (throat and neck).  Dispense: 4 tablet; Refill: 0    Neck pain  -     ibuprofen (ADVIL,MOTRIN) 800 MG tablet; Take 1 tablet (800 mg total) by mouth every 6 (six) hours as needed for Pain (throat and neck).  Dispense: 4 tablet; Refill: 0                Follow up if symptoms worsen or fail to improve.    Felisha Lagos, APRN, MSN, FNP-C

## 2022-04-18 NOTE — TELEPHONE ENCOUNTER
You are positive for the flu. Your lump may be related to the flu, but we will get the ultrasound to be sure. Make sure to rest and stay hydrated. Tylenol as needed for fevers. Ibuprofen as needed for both aches. Mucinex as needed for congestion. I am sending some medication over for the cough, along with nasal spray.

## 2022-04-18 NOTE — TELEPHONE ENCOUNTER
The following message is from the patient portal         Good morning Dr. Gonzalez     I still have that lump in my throat and I am starting to have some sort of hollow breathing as well as some rapid chest movements or palpitations I suppose. I was actually wondering if we could go ahead and do that ultrasound to see what is going on. or Ive been hearing that theres like a case of strep going around the city do you think that could be some thing along those lines? dont have any fever or any other symptoms Except I have developed as I said hallow breathing and a dry cough.

## 2022-04-19 ENCOUNTER — HOSPITAL ENCOUNTER (OUTPATIENT)
Dept: RADIOLOGY | Facility: OTHER | Age: 32
Discharge: HOME | End: 2022-04-19
Attending: INTERNAL MEDICINE
Payer: COMMERCIAL

## 2022-04-19 DIAGNOSIS — E04.9 ENLARGED THYROID: ICD-10-CM

## 2022-04-19 DIAGNOSIS — R22.1 LUMP IN THROAT: ICD-10-CM

## 2022-04-19 PROCEDURE — 76536 US EXAM OF HEAD AND NECK: CPT | Mod: 26,,, | Performed by: RADIOLOGY

## 2022-04-19 PROCEDURE — 76536 US EXAM OF HEAD AND NECK: CPT | Mod: TC

## 2022-04-19 PROCEDURE — 76536 US SOFT TISSUE HEAD NECK THYROID: ICD-10-PCS | Mod: 26,,, | Performed by: RADIOLOGY

## 2022-04-20 ENCOUNTER — PATIENT MESSAGE (OUTPATIENT)
Dept: PRIMARY CARE CLINIC | Facility: CLINIC | Age: 32
End: 2022-04-20
Payer: COMMERCIAL

## 2022-04-20 DIAGNOSIS — E04.1 THYROID NODULE GREATER THAN OR EQUAL TO 1.5 CM IN DIAMETER INCIDENTALLY NOTED ON IMAGING STUDY: Primary | ICD-10-CM

## 2022-04-20 NOTE — TELEPHONE ENCOUNTER
"Patient sent the following message through the portal;     "Jimy Gonzalez,   I tried calling you back at the same number and it said the number was not in service. Thank you for the information, I will wait for their call.       DESI  "

## 2022-04-20 NOTE — PROGRESS NOTES
Hello,  Seems like we found a nodule in your thyroid. Not all nodules are malignant and worrisome. With that said, given the size, we should go ahead and biopsy the nodule to make sure. I am scheduling a biopsy with [facility]ventional [facility]. They should be calling to try to schedule you for the procedure. It is a very quick, outpatient procedure. Feel free to call me if you have any questions, I tried to call you this morning but couldn't get a hold of you.     Blanca - can we make sure her biopsy is scheduled?

## 2022-04-21 LAB — BACTERIA THROAT CULT: NORMAL

## 2022-04-27 ENCOUNTER — HOSPITAL ENCOUNTER (OUTPATIENT)
Dept: RADIOLOGY | Facility: OTHER | Age: 32
Discharge: HOME | End: 2022-04-27
Attending: INTERNAL MEDICINE
Payer: COMMERCIAL

## 2022-04-27 DIAGNOSIS — E04.1 THYROID NODULE GREATER THAN OR EQUAL TO 1.5 CM IN DIAMETER INCIDENTALLY NOTED ON IMAGING STUDY: ICD-10-CM

## 2022-04-27 PROCEDURE — 10005 FNA BX W/US GDN 1ST LES: CPT | Mod: ,,, | Performed by: RADIOLOGY

## 2022-04-27 PROCEDURE — 88172 CYTP DX EVAL FNA 1ST EA SITE: CPT | Performed by: PATHOLOGY

## 2022-04-27 PROCEDURE — 88172 CYTP DX EVAL FNA 1ST EA SITE: CPT | Mod: 26,,, | Performed by: PATHOLOGY

## 2022-04-27 PROCEDURE — 88172 PR  EVALUATION OF FNA SMEAR TO DETERMINE ADEQUACY, FIRST EVAL: ICD-10-PCS | Mod: 26,,, | Performed by: PATHOLOGY

## 2022-04-27 PROCEDURE — 25000003 PHARM REV CODE 250: Performed by: INTERNAL MEDICINE

## 2022-04-27 PROCEDURE — 10005 IR US FINE NEEDLE ASPIRATION BIOPSY, FIRST LESION: ICD-10-PCS | Mod: ,,, | Performed by: RADIOLOGY

## 2022-04-27 PROCEDURE — 88173 PR  INTERPRETATION OF FNA SMEAR: ICD-10-PCS | Mod: 26,,, | Performed by: PATHOLOGY

## 2022-04-27 PROCEDURE — 88173 CYTOPATH EVAL FNA REPORT: CPT | Mod: 26,,, | Performed by: PATHOLOGY

## 2022-04-27 PROCEDURE — 10005 FNA BX W/US GDN 1ST LES: CPT

## 2022-04-27 PROCEDURE — 88173 CYTOPATH EVAL FNA REPORT: CPT | Performed by: PATHOLOGY

## 2022-04-27 RX ORDER — LIDOCAINE HYDROCHLORIDE 10 MG/ML
5 INJECTION INFILTRATION; PERINEURAL ONCE
Status: COMPLETED | OUTPATIENT
Start: 2022-04-27 | End: 2022-04-27

## 2022-04-27 RX ORDER — LIDOCAINE HYDROCHLORIDE 10 MG/ML
1 INJECTION INFILTRATION; PERINEURAL ONCE
Status: DISCONTINUED | OUTPATIENT
Start: 2022-04-27 | End: 2022-04-28 | Stop reason: HOSPADM

## 2022-04-27 RX ADMIN — LIDOCAINE HYDROCHLORIDE 5 ML: 10 INJECTION, SOLUTION EPIDURAL; INFILTRATION; INTRACAUDAL; PERINEURAL at 10:04

## 2022-04-27 NOTE — PROCEDURES
Radiology Post-Procedure Note    Pre Op Diagnosis: thyroid nodule  Post Op Diagnosis: Same    Procedure: FNA    Procedure performed by: Dayday Mccoy MD    Written Informed Consent Obtained: Yes  Specimen Removed: YES 3 FNA specimens  Estimated Blood Loss: Minimal    Findings:   FNA of R thyroid nodule    Patient tolerated procedure well.    @SIG@

## 2022-05-06 ENCOUNTER — PATIENT MESSAGE (OUTPATIENT)
Dept: PRIMARY CARE CLINIC | Facility: CLINIC | Age: 32
End: 2022-05-06
Payer: COMMERCIAL

## 2022-05-06 DIAGNOSIS — D34 BENIGN FOLLICULAR TUMOR OF THYROID GLAND: Primary | ICD-10-CM

## 2022-05-06 LAB
ADEQUACY: ABNORMAL
FINAL PATHOLOGIC DIAGNOSIS: ABNORMAL
Lab: ABNORMAL

## 2022-05-06 NOTE — TELEPHONE ENCOUNTER
I am sorry if the wording was too vague.     I am going off of what the pathology report is showing that reports follicular lesion of undetermined significance. While I note that this is most likely not related to cancer, the report does seem to indicate that there is a lesion there. Since this is out of my scope of practice, I am making a referral to an endocrinologist who can help us manage this better.

## 2022-05-09 PROBLEM — M54.2 NECK PAIN: Status: ACTIVE | Noted: 2022-05-09

## 2022-05-09 PROBLEM — J02.9 ACUTE PHARYNGITIS: Status: ACTIVE | Noted: 2022-05-09

## 2022-05-11 ENCOUNTER — TELEPHONE (OUTPATIENT)
Dept: PRIMARY CARE CLINIC | Facility: CLINIC | Age: 32
End: 2022-05-11
Payer: COMMERCIAL

## 2022-05-12 ENCOUNTER — TELEPHONE (OUTPATIENT)
Dept: PRIMARY CARE CLINIC | Facility: CLINIC | Age: 32
End: 2022-05-12
Payer: COMMERCIAL

## 2022-05-15 DIAGNOSIS — J02.9 SORE THROAT: ICD-10-CM

## 2022-05-15 DIAGNOSIS — R05.9 COUGH: ICD-10-CM

## 2022-05-16 RX ORDER — FLUTICASONE PROPIONATE 50 MCG
SPRAY, SUSPENSION (ML) NASAL
Qty: 48 G | Refills: 1 | Status: SHIPPED | OUTPATIENT
Start: 2022-05-16 | End: 2023-06-02

## 2022-07-19 ENCOUNTER — TELEPHONE (OUTPATIENT)
Dept: ENDOCRINOLOGY | Facility: CLINIC | Age: 32
End: 2022-07-19
Payer: COMMERCIAL

## 2022-07-19 NOTE — TELEPHONE ENCOUNTER
Offered Ms. Grady an appt with SADE Garnica PA-C on 9/26/22 & with Dr. Becerril on 10/20/22 but she declined both.

## 2022-07-19 NOTE — TELEPHONE ENCOUNTER
----- Message from Anabell Rodriguez sent at 7/19/2022 10:38 AM CDT -----  Contact: Pt  Type:  Sooner Appointment Request    Caller is requesting a sooner appointment.  Caller declined first available appointment listed below.  Caller will not accept being placed on the waitlist and is requesting a message be sent to doctor.    Name of Caller:  Pt  When is the first available appointment?  10/27  Symptoms:  Thyroid nodule, referred by Dr. Carlos Nolasco Call Back Number:  [phone]  Additional Information:  Please call back.  Thanks.

## 2022-08-22 ENCOUNTER — LAB VISIT (OUTPATIENT)
Dept: LAB | Facility: HOSPITAL | Age: 32
End: 2022-08-22
Attending: INTERNAL MEDICINE
Payer: COMMERCIAL

## 2022-08-22 ENCOUNTER — OFFICE VISIT (OUTPATIENT)
Dept: PRIMARY CARE CLINIC | Facility: CLINIC | Age: 32
End: 2022-08-22
Payer: COMMERCIAL

## 2022-08-22 VITALS
OXYGEN SATURATION: 99 % | SYSTOLIC BLOOD PRESSURE: 100 MMHG | TEMPERATURE: 98 F | WEIGHT: 192.44 LBS | BODY MASS INDEX: 32.85 KG/M2 | RESPIRATION RATE: 18 BRPM | HEIGHT: 64 IN | HEART RATE: 75 BPM | DIASTOLIC BLOOD PRESSURE: 80 MMHG

## 2022-08-22 DIAGNOSIS — Z01.818 PREOPERATIVE CLEARANCE: ICD-10-CM

## 2022-08-22 DIAGNOSIS — Z01.818 PREOPERATIVE CLEARANCE: Primary | ICD-10-CM

## 2022-08-22 LAB
ALBUMIN SERPL BCP-MCNC: 3.9 G/DL (ref 3.5–5.2)
ALP SERPL-CCNC: 37 U/L (ref 55–135)
ALT SERPL W/O P-5'-P-CCNC: 14 U/L (ref 10–44)
ANION GAP SERPL CALC-SCNC: 5 MMOL/L (ref 8–16)
AST SERPL-CCNC: 15 U/L (ref 10–40)
BASOPHILS # BLD AUTO: 0.05 K/UL (ref 0–0.2)
BASOPHILS NFR BLD: 1 % (ref 0–1.9)
BILIRUB SERPL-MCNC: 0.5 MG/DL (ref 0.1–1)
BUN SERPL-MCNC: 10 MG/DL (ref 6–20)
CALCIUM SERPL-MCNC: 9 MG/DL (ref 8.7–10.5)
CHLORIDE SERPL-SCNC: 102 MMOL/L (ref 95–110)
CO2 SERPL-SCNC: 26 MMOL/L (ref 23–29)
CREAT SERPL-MCNC: 0.8 MG/DL (ref 0.5–1.4)
DIFFERENTIAL METHOD: ABNORMAL
EOSINOPHIL # BLD AUTO: 0.1 K/UL (ref 0–0.5)
EOSINOPHIL NFR BLD: 2.5 % (ref 0–8)
ERYTHROCYTE [DISTWIDTH] IN BLOOD BY AUTOMATED COUNT: 14 % (ref 11.5–14.5)
EST. GFR  (NO RACE VARIABLE): >60 ML/MIN/1.73 M^2
GLUCOSE SERPL-MCNC: 85 MG/DL (ref 70–110)
HCT VFR BLD AUTO: 33.3 % (ref 37–48.5)
HGB BLD-MCNC: 10.9 G/DL (ref 12–16)
IMM GRANULOCYTES # BLD AUTO: 0.01 K/UL (ref 0–0.04)
IMM GRANULOCYTES NFR BLD AUTO: 0.2 % (ref 0–0.5)
LYMPHOCYTES # BLD AUTO: 2.5 K/UL (ref 1–4.8)
LYMPHOCYTES NFR BLD: 52.3 % (ref 18–48)
MCH RBC QN AUTO: 28.7 PG (ref 27–31)
MCHC RBC AUTO-ENTMCNC: 32.7 G/DL (ref 32–36)
MCV RBC AUTO: 88 FL (ref 82–98)
MONOCYTES # BLD AUTO: 0.2 K/UL (ref 0.3–1)
MONOCYTES NFR BLD: 4.8 % (ref 4–15)
NEUTROPHILS # BLD AUTO: 1.9 K/UL (ref 1.8–7.7)
NEUTROPHILS NFR BLD: 39.2 % (ref 38–73)
NRBC BLD-RTO: 0 /100 WBC
PLATELET # BLD AUTO: 302 K/UL (ref 150–450)
PMV BLD AUTO: 10.5 FL (ref 9.2–12.9)
POTASSIUM SERPL-SCNC: 3.9 MMOL/L (ref 3.5–5.1)
PROT SERPL-MCNC: 6.6 G/DL (ref 6–8.4)
RBC # BLD AUTO: 3.8 M/UL (ref 4–5.4)
SODIUM SERPL-SCNC: 133 MMOL/L (ref 136–145)
WBC # BLD AUTO: 4.84 K/UL (ref 3.9–12.7)

## 2022-08-22 PROCEDURE — 85025 COMPLETE CBC W/AUTO DIFF WBC: CPT | Performed by: INTERNAL MEDICINE

## 2022-08-22 PROCEDURE — 1160F PR REVIEW ALL MEDS BY PRESCRIBER/CLIN PHARMACIST DOCUMENTED: ICD-10-PCS | Mod: CPTII,S$GLB,, | Performed by: INTERNAL MEDICINE

## 2022-08-22 PROCEDURE — 36415 COLL VENOUS BLD VENIPUNCTURE: CPT | Mod: PN | Performed by: INTERNAL MEDICINE

## 2022-08-22 PROCEDURE — 3079F DIAST BP 80-89 MM HG: CPT | Mod: CPTII,S$GLB,, | Performed by: INTERNAL MEDICINE

## 2022-08-22 PROCEDURE — 85610 PROTHROMBIN TIME: CPT | Performed by: INTERNAL MEDICINE

## 2022-08-22 PROCEDURE — 3074F SYST BP LT 130 MM HG: CPT | Mod: CPTII,S$GLB,, | Performed by: INTERNAL MEDICINE

## 2022-08-22 PROCEDURE — 99214 OFFICE O/P EST MOD 30 MIN: CPT | Mod: S$GLB,,, | Performed by: INTERNAL MEDICINE

## 2022-08-22 PROCEDURE — 3079F PR MOST RECENT DIASTOLIC BLOOD PRESSURE 80-89 MM HG: ICD-10-PCS | Mod: CPTII,S$GLB,, | Performed by: INTERNAL MEDICINE

## 2022-08-22 PROCEDURE — 99999 PR PBB SHADOW E&M-EST. PATIENT-LVL III: ICD-10-PCS | Mod: PBBFAC,,, | Performed by: INTERNAL MEDICINE

## 2022-08-22 PROCEDURE — 80053 COMPREHEN METABOLIC PANEL: CPT | Performed by: INTERNAL MEDICINE

## 2022-08-22 PROCEDURE — 1159F PR MEDICATION LIST DOCUMENTED IN MEDICAL RECORD: ICD-10-PCS | Mod: CPTII,S$GLB,, | Performed by: INTERNAL MEDICINE

## 2022-08-22 PROCEDURE — 1160F RVW MEDS BY RX/DR IN RCRD: CPT | Mod: CPTII,S$GLB,, | Performed by: INTERNAL MEDICINE

## 2022-08-22 PROCEDURE — 1159F MED LIST DOCD IN RCRD: CPT | Mod: CPTII,S$GLB,, | Performed by: INTERNAL MEDICINE

## 2022-08-22 PROCEDURE — 93010 EKG 12-LEAD: ICD-10-PCS | Mod: S$GLB,,, | Performed by: INTERNAL MEDICINE

## 2022-08-22 PROCEDURE — 3008F PR BODY MASS INDEX (BMI) DOCUMENTED: ICD-10-PCS | Mod: CPTII,S$GLB,, | Performed by: INTERNAL MEDICINE

## 2022-08-22 PROCEDURE — 3074F PR MOST RECENT SYSTOLIC BLOOD PRESSURE < 130 MM HG: ICD-10-PCS | Mod: CPTII,S$GLB,, | Performed by: INTERNAL MEDICINE

## 2022-08-22 PROCEDURE — 93005 ELECTROCARDIOGRAM TRACING: CPT | Mod: S$GLB,,, | Performed by: INTERNAL MEDICINE

## 2022-08-22 PROCEDURE — 99999 PR PBB SHADOW E&M-EST. PATIENT-LVL III: CPT | Mod: PBBFAC,,, | Performed by: INTERNAL MEDICINE

## 2022-08-22 PROCEDURE — 93005 EKG 12-LEAD: ICD-10-PCS | Mod: S$GLB,,, | Performed by: INTERNAL MEDICINE

## 2022-08-22 PROCEDURE — 3008F BODY MASS INDEX DOCD: CPT | Mod: CPTII,S$GLB,, | Performed by: INTERNAL MEDICINE

## 2022-08-22 PROCEDURE — 99214 PR OFFICE/OUTPT VISIT, EST, LEVL IV, 30-39 MIN: ICD-10-PCS | Mod: S$GLB,,, | Performed by: INTERNAL MEDICINE

## 2022-08-22 PROCEDURE — 85730 THROMBOPLASTIN TIME PARTIAL: CPT | Performed by: INTERNAL MEDICINE

## 2022-08-22 PROCEDURE — 93010 ELECTROCARDIOGRAM REPORT: CPT | Mod: S$GLB,,, | Performed by: INTERNAL MEDICINE

## 2022-08-22 NOTE — PROGRESS NOTES
Subjective:      Patient ID: Maryan Grady is a 31 y.o. female.    Chief Complaint: Pre-op Exam    Preoperative exam: Patient is here to discuss upcoming procedure. She is going to be getting liposuction done with bariatric surgeon in [place]. She is here for clearance. She has been searching for a surgeon since last visit. Her surgeon's name is Roxanna Thakur and the procedure is planned for 9/23/2022.     Pt has no active cardiac condition (ACS/USA, decompenstated CHF, significant arrhythmias or severe valvular disease) and can easily achieve 4 METS.  Pt does not require any further workup prior to liposuction. These recommendations follow the most current Guideline on Perioperative Cardiovascular Evaluation and Management of Patients Undergoing Noncardiac Surgery released by the ACC/AHA. (JACC 2014.07.944).    EKG and blood work ordered     Denies any chest pain, shortness of breath, nausea vomiting constipation diarrhea, blood in stool, heartburn    Review of Systems   Constitutional: Negative for chills, fever and weight loss.   HENT: Negative for congestion, ear pain and sore throat.    Eyes: Negative for double vision.   Respiratory: Negative for cough and shortness of breath.    Cardiovascular: Negative for chest pain, palpitations and leg swelling.   Gastrointestinal: Negative for abdominal pain, heartburn, nausea and vomiting.   Skin: Negative for rash.   Neurological: Negative for dizziness, tingling and headaches.   Psychiatric/Behavioral: Negative for depression.         Current Outpatient Medications:     fluticasone propionate (FLONASE) 50 mcg/actuation nasal spray, USE 1 SPRAY IN EACH NOSTRIL ONCE DAILY (Patient not taking: Reported on 8/22/2022), Disp: 48 g, Rfl: 1    ibuprofen (ADVIL,MOTRIN) 800 MG tablet, Take 1 tablet (800 mg total) by mouth every 6 (six) hours as needed for Pain (throat and neck). (Patient not taking: Reported on 8/22/2022), Disp: 4 tablet, Rfl: 0    No results  "found for: HGBA1C  No results found for: MICALBCREAT  No results found for: LDLCALC, CHOL, HDL, TRIG    Lab Results   Component Value Date     (L) 08/06/2020    K 4.0 08/06/2020     08/06/2020    CO2 20 (L) 08/06/2020     (H) 08/06/2020    BUN 11 08/06/2020    CREATININE 0.7 08/06/2020    CALCIUM 9.3 08/06/2020    ANIONGAP 9 08/06/2020    ESTGFRAFRICA >60 08/06/2020    EGFRNONAA >60 08/06/2020    WBC 5.29 02/21/2022    HGB 11.3 (L) 02/21/2022    HGB 8.6 (L) 02/12/2021    HCT 34.6 (L) 02/21/2022    MCV 86 02/21/2022     02/21/2022    TSH 1.670 04/13/2022       No results found for: LH, FSH, TOTALTESTOST, PROGESTERONE, ESTRADIOL, [id number], [id number], FERRITIN, IRON, TRANSFERRIN, TIBC, FESATURATED, ZINC      Past Medical History:   Diagnosis Date    GERD (gastroesophageal reflux disease)      Past Surgical History:   Procedure Laterality Date    EYE SURGERY      strabismus     Social History     Social History Narrative    Not on file     Family History   Problem Relation Age of Onset    Hypothyroidism Mother     Hypertension Father     Melanoma Neg Hx     Breast cancer Neg Hx     Colon cancer Neg Hx     Ovarian cancer Neg Hx      Vitals:    08/22/22 1306   BP: 100/80   Pulse: 75   Resp: 18   Temp: 98.3 °F (36.8 °C)   SpO2: 99%   Weight: 87.3 kg (192 lb 7.4 oz)   Height: 5' 4" (1.626 m)   PainSc: 0-No pain     Objective:   Physical Exam  Vitals reviewed.   Constitutional:       Appearance: Normal appearance.   HENT:      Head: Normocephalic.   Eyes:      Pupils: Pupils are equal, round, and reactive to light.   Cardiovascular:      Rate and Rhythm: Normal rate.      Pulses: Normal pulses.      Heart sounds: Normal heart sounds.   Pulmonary:      Effort: Pulmonary effort is normal.      Breath sounds: Normal breath sounds.   Abdominal:      General: Bowel sounds are normal.      Palpations: Abdomen is soft.   Musculoskeletal:         General: Normal range of motion.   Skin:     " General: Skin is warm.   Neurological:      General: No focal deficit present.      Mental Status: She is alert. Mental status is at baseline.   Psychiatric:         Mood and Affect: Mood normal.       Assessment:     1. Preoperative clearance      Plan:     Orders Placed This Encounter    CBC Auto Differential    PROTIME-INR    APTT    Comprehensive Metabolic Panel    IN OFFICE EKG 12-LEAD (to [place])

## 2022-08-23 ENCOUNTER — PATIENT MESSAGE (OUTPATIENT)
Dept: PRIMARY CARE CLINIC | Facility: CLINIC | Age: 32
End: 2022-08-23
Payer: COMMERCIAL

## 2022-08-23 LAB
APTT BLDCRRT: 30.4 SEC (ref 21–32)
INR PPP: 1.1 (ref 0.8–1.2)
PROTHROMBIN TIME: 11.9 SEC (ref 9–12.5)

## 2022-08-23 NOTE — PROGRESS NOTES
Dave Steinberg,    Your labs are all good and acceptable. I wrote a letter for your surgeon and should be up in the portal shortly. Good luck with your procedure,     Dr. Gonzalez

## 2022-08-24 ENCOUNTER — PATIENT MESSAGE (OUTPATIENT)
Dept: PRIMARY CARE CLINIC | Facility: CLINIC | Age: 32
End: 2022-08-24
Payer: COMMERCIAL

## 2022-08-24 ENCOUNTER — PATIENT MESSAGE (OUTPATIENT)
Dept: ADMINISTRATIVE | Facility: HOSPITAL | Age: 32
End: 2022-08-24
Payer: COMMERCIAL

## 2022-08-24 DIAGNOSIS — Z01.818 PREOP EXAMINATION: Primary | ICD-10-CM

## 2022-08-29 ENCOUNTER — PATIENT MESSAGE (OUTPATIENT)
Dept: PRIMARY CARE CLINIC | Facility: CLINIC | Age: 32
End: 2022-08-29
Payer: COMMERCIAL

## 2022-08-31 ENCOUNTER — LAB VISIT (OUTPATIENT)
Dept: LAB | Facility: HOSPITAL | Age: 32
End: 2022-08-31
Attending: INTERNAL MEDICINE
Payer: COMMERCIAL

## 2022-08-31 DIAGNOSIS — Z01.818 PREOP EXAMINATION: ICD-10-CM

## 2022-08-31 LAB
ESTIMATED AVG GLUCOSE: 105 MG/DL (ref 68–131)
HBA1C MFR BLD: 5.3 % (ref 4–5.6)
T3 SERPL-MCNC: 101 NG/DL (ref 60–180)
T4 FREE SERPL-MCNC: 1.04 NG/DL (ref 0.71–1.51)
TSH SERPL DL<=0.005 MIU/L-ACNC: 2.37 UIU/ML (ref 0.4–4)

## 2022-08-31 PROCEDURE — 36415 COLL VENOUS BLD VENIPUNCTURE: CPT | Mod: PN | Performed by: INTERNAL MEDICINE

## 2022-08-31 PROCEDURE — 84439 ASSAY OF FREE THYROXINE: CPT | Performed by: INTERNAL MEDICINE

## 2022-08-31 PROCEDURE — 83036 HEMOGLOBIN GLYCOSYLATED A1C: CPT | Performed by: INTERNAL MEDICINE

## 2022-08-31 PROCEDURE — 84443 ASSAY THYROID STIM HORMONE: CPT | Performed by: INTERNAL MEDICINE

## 2022-08-31 PROCEDURE — 84480 ASSAY TRIIODOTHYRONINE (T3): CPT | Performed by: INTERNAL MEDICINE

## 2022-09-01 ENCOUNTER — PATIENT MESSAGE (OUTPATIENT)
Dept: PRIMARY CARE CLINIC | Facility: CLINIC | Age: 32
End: 2022-09-01
Payer: COMMERCIAL

## 2022-09-01 DIAGNOSIS — Z01.818 PREOP EXAMINATION: Primary | ICD-10-CM

## 2022-09-01 NOTE — PROGRESS NOTES
All your results are normal.     A1c shows that you do not have diabetes or prediabetes.     Your thyroid function labs (TSH, T3, T4) are all within normal limits.

## 2022-09-06 ENCOUNTER — LAB VISIT (OUTPATIENT)
Dept: LAB | Facility: HOSPITAL | Age: 32
End: 2022-09-06
Attending: INTERNAL MEDICINE
Payer: COMMERCIAL

## 2022-09-06 DIAGNOSIS — Z01.818 PREOP EXAMINATION: ICD-10-CM

## 2022-09-06 LAB
BASOPHILS # BLD AUTO: 0.05 K/UL (ref 0–0.2)
BASOPHILS NFR BLD: 1.2 % (ref 0–1.9)
DIFFERENTIAL METHOD: ABNORMAL
EOSINOPHIL # BLD AUTO: 0.1 K/UL (ref 0–0.5)
EOSINOPHIL NFR BLD: 3.3 % (ref 0–8)
ERYTHROCYTE [DISTWIDTH] IN BLOOD BY AUTOMATED COUNT: 13.7 % (ref 11.5–14.5)
HCT VFR BLD AUTO: 36.3 % (ref 37–48.5)
HGB BLD-MCNC: 12.1 G/DL (ref 12–16)
IMM GRANULOCYTES # BLD AUTO: 0.02 K/UL (ref 0–0.04)
IMM GRANULOCYTES NFR BLD AUTO: 0.5 % (ref 0–0.5)
LYMPHOCYTES # BLD AUTO: 2.1 K/UL (ref 1–4.8)
LYMPHOCYTES NFR BLD: 49.5 % (ref 18–48)
MCH RBC QN AUTO: 28.1 PG (ref 27–31)
MCHC RBC AUTO-ENTMCNC: 33.3 G/DL (ref 32–36)
MCV RBC AUTO: 84 FL (ref 82–98)
MONOCYTES # BLD AUTO: 0.3 K/UL (ref 0.3–1)
MONOCYTES NFR BLD: 5.8 % (ref 4–15)
NEUTROPHILS # BLD AUTO: 1.7 K/UL (ref 1.8–7.7)
NEUTROPHILS NFR BLD: 39.7 % (ref 38–73)
NRBC BLD-RTO: 0 /100 WBC
PLATELET # BLD AUTO: 304 K/UL (ref 150–450)
PMV BLD AUTO: 10.5 FL (ref 9.2–12.9)
RBC # BLD AUTO: 4.31 M/UL (ref 4–5.4)
WBC # BLD AUTO: 4.3 K/UL (ref 3.9–12.7)

## 2022-09-06 PROCEDURE — 85025 COMPLETE CBC W/AUTO DIFF WBC: CPT | Performed by: INTERNAL MEDICINE

## 2022-09-06 PROCEDURE — 36415 COLL VENOUS BLD VENIPUNCTURE: CPT | Mod: PN | Performed by: INTERNAL MEDICINE

## 2022-10-03 ENCOUNTER — TELEPHONE (OUTPATIENT)
Dept: PRIMARY CARE CLINIC | Facility: CLINIC | Age: 32
End: 2022-10-03

## 2022-10-03 ENCOUNTER — OFFICE VISIT (OUTPATIENT)
Dept: PRIMARY CARE CLINIC | Facility: CLINIC | Age: 32
End: 2022-10-03
Payer: COMMERCIAL

## 2022-10-03 VITALS
BODY MASS INDEX: 31.73 KG/M2 | SYSTOLIC BLOOD PRESSURE: 118 MMHG | HEART RATE: 103 BPM | HEIGHT: 64 IN | TEMPERATURE: 98 F | WEIGHT: 185.88 LBS | RESPIRATION RATE: 18 BRPM | OXYGEN SATURATION: 99 % | DIASTOLIC BLOOD PRESSURE: 73 MMHG

## 2022-10-03 DIAGNOSIS — I97.622 POSTOPERATIVE SEROMA INVOLVING CIRCULATORY SYSTEM AFTER NON-CIRCULATORY PROCEDURE: Primary | ICD-10-CM

## 2022-10-03 PROCEDURE — 1160F RVW MEDS BY RX/DR IN RCRD: CPT | Mod: CPTII,S$GLB,, | Performed by: INTERNAL MEDICINE

## 2022-10-03 PROCEDURE — 99213 PR OFFICE/OUTPT VISIT, EST, LEVL III, 20-29 MIN: ICD-10-PCS | Mod: S$GLB,,, | Performed by: INTERNAL MEDICINE

## 2022-10-03 PROCEDURE — 1159F PR MEDICATION LIST DOCUMENTED IN MEDICAL RECORD: ICD-10-PCS | Mod: CPTII,S$GLB,, | Performed by: INTERNAL MEDICINE

## 2022-10-03 PROCEDURE — 3078F PR MOST RECENT DIASTOLIC BLOOD PRESSURE < 80 MM HG: ICD-10-PCS | Mod: CPTII,S$GLB,, | Performed by: INTERNAL MEDICINE

## 2022-10-03 PROCEDURE — 3078F DIAST BP <80 MM HG: CPT | Mod: CPTII,S$GLB,, | Performed by: INTERNAL MEDICINE

## 2022-10-03 PROCEDURE — 99999 PR PBB SHADOW E&M-EST. PATIENT-LVL IV: CPT | Mod: PBBFAC,,, | Performed by: INTERNAL MEDICINE

## 2022-10-03 PROCEDURE — 1160F PR REVIEW ALL MEDS BY PRESCRIBER/CLIN PHARMACIST DOCUMENTED: ICD-10-PCS | Mod: CPTII,S$GLB,, | Performed by: INTERNAL MEDICINE

## 2022-10-03 PROCEDURE — 99999 PR PBB SHADOW E&M-EST. PATIENT-LVL IV: ICD-10-PCS | Mod: PBBFAC,,, | Performed by: INTERNAL MEDICINE

## 2022-10-03 PROCEDURE — 3074F SYST BP LT 130 MM HG: CPT | Mod: CPTII,S$GLB,, | Performed by: INTERNAL MEDICINE

## 2022-10-03 PROCEDURE — 1159F MED LIST DOCD IN RCRD: CPT | Mod: CPTII,S$GLB,, | Performed by: INTERNAL MEDICINE

## 2022-10-03 PROCEDURE — 3074F PR MOST RECENT SYSTOLIC BLOOD PRESSURE < 130 MM HG: ICD-10-PCS | Mod: CPTII,S$GLB,, | Performed by: INTERNAL MEDICINE

## 2022-10-03 PROCEDURE — 3008F BODY MASS INDEX DOCD: CPT | Mod: CPTII,S$GLB,, | Performed by: INTERNAL MEDICINE

## 2022-10-03 PROCEDURE — 3044F HG A1C LEVEL LT 7.0%: CPT | Mod: CPTII,S$GLB,, | Performed by: INTERNAL MEDICINE

## 2022-10-03 PROCEDURE — 3008F PR BODY MASS INDEX (BMI) DOCUMENTED: ICD-10-PCS | Mod: CPTII,S$GLB,, | Performed by: INTERNAL MEDICINE

## 2022-10-03 PROCEDURE — 99213 OFFICE O/P EST LOW 20 MIN: CPT | Mod: S$GLB,,, | Performed by: INTERNAL MEDICINE

## 2022-10-03 PROCEDURE — 3044F PR MOST RECENT HEMOGLOBIN A1C LEVEL <7.0%: ICD-10-PCS | Mod: CPTII,S$GLB,, | Performed by: INTERNAL MEDICINE

## 2022-10-03 NOTE — LETTER
October 4, 2022      [facility]  [address]  [facility] [address]  Phone: [phone]  Fax: [phone]       Patient: Maryan Grady   YOB: 1990  Date of Visit: 10/3/2022    To Whom It May Concern:    Dina Grady  was at [facility] on 10/04/2022. The patient may return to work/school on 10/10/2022 with no restrictions. If you have any questions or concerns, or if I can be of further assistance, please do not hesitate to contact me.    Sincerely,    Annalisa Acevedo MA   Office Tita Gonzalez MD

## 2022-10-03 NOTE — PROGRESS NOTES
Subjective:      Patient ID: Maryan Grady is a 31 y.o. female.    Chief Complaint: Post-op Problem    The patient is here today following a liposuction procedures that she has done in [place].  She states that she is postop day 7 following her procedure.  She reports that she had small pocket of fluid that was noted following the surgery which she was getting lymphatic massage for.  She underwent about four lymphatic massage is at this point.  She has no fevers/chills/ no erythema around the fluid collection. Incision site C/D/I.     Denies any chest pain, shortness of breath, nausea vomiting constipation diarrhea, blood in stool, heartburn    Review of Systems   Constitutional:  Negative for chills, fever and weight loss.   HENT:  Negative for congestion, ear pain and sore throat.    Eyes:  Negative for double vision.   Respiratory:  Negative for cough and shortness of breath.    Cardiovascular:  Negative for chest pain, palpitations and leg swelling.   Gastrointestinal:  Negative for abdominal pain, heartburn, nausea and vomiting.   Skin:  Negative for rash.   Neurological:  Negative for dizziness, tingling and headaches.   Psychiatric/Behavioral:  Negative for depression.        Current Outpatient Medications:     fluticasone propionate (FLONASE) 50 mcg/actuation nasal spray, USE 1 SPRAY IN EACH NOSTRIL ONCE DAILY (Patient not taking: No sig reported), Disp: 48 g, Rfl: 1    ibuprofen (ADVIL,MOTRIN) 800 MG tablet, Take 1 tablet (800 mg total) by mouth every 6 (six) hours as needed for Pain (throat and neck). (Patient not taking: No sig reported), Disp: 4 tablet, Rfl: 0    Lab Results   Component Value Date    HGBA1C 5.3 08/31/2022     No results found for: MICALBCREAT  No results found for: LDLCALC, CHOL, HDL, TRIG    Lab Results   Component Value Date     (L) 08/22/2022    K 3.9 08/22/2022     08/22/2022    CO2 26 08/22/2022    GLU 85 08/22/2022    BUN 10 08/22/2022    CREATININE 0.8  "08/22/2022    CALCIUM 9.0 08/22/2022    PROT 6.6 08/22/2022    ALBUMIN 3.9 08/22/2022    BILITOT 0.5 08/22/2022    ALKPHOS 37 (L) 08/22/2022    AST 15 08/22/2022    ALT 14 08/22/2022    ANIONGAP 5 (L) 08/22/2022    ESTGFRAFRICA >60 08/06/2020    EGFRNONAA >60 08/06/2020    WBC 4.30 09/06/2022    HGB 12.1 09/06/2022    HGB 10.9 (L) 08/22/2022    HCT 36.3 (L) 09/06/2022    MCV 84 09/06/2022     09/06/2022    TSH 2.373 08/31/2022       No results found for: LH, FSH, TOTALTESTOST, PROGESTERONE, ESTRADIOL, [id number], [id number], FERRITIN, IRON, TRANSFERRIN, TIBC, FESATURATED, ZINC      Past Medical History:   Diagnosis Date    GERD (gastroesophageal reflux disease)      Past Surgical History:   Procedure Laterality Date    EYE SURGERY      strabismus     Social History     Social History Narrative    Not on file     Family History   Problem Relation Age of Onset    Hypothyroidism Mother     Hypertension Father     Melanoma Neg Hx     Breast cancer Neg Hx     Colon cancer Neg Hx     Ovarian cancer Neg Hx      Vitals:    10/03/22 1123   BP: 118/73   Pulse: 103   Resp: 18   Temp: 98 °F (36.7 °C)   SpO2: 99%   Weight: 84.3 kg (185 lb 13.6 oz)   Height: 5' 4" (1.626 m)   PainSc:   8     Objective:   Physical Exam  Constitutional:       Appearance: Normal appearance.   HENT:      Head: Normocephalic.      Nose: Nose normal.   Abdominal:          Comments: Small cyst on RLQ, no erythema, swelling. Incision sites C/D/I.    Neurological:      Mental Status: She is alert and oriented to person, place, and time. Mental status is at baseline.   Psychiatric:         Mood and Affect: Mood normal.         Behavior: Behavior normal.     Assessment:     1. Postoperative seroma involving circulatory system after non-circulatory procedure      Plan:     Orders Placed This Encounter    Ambulatory referral/consult to General Surgery   - cont with lymphatic massages  - Patient was advised to reach out to her plastic surgeon for " further guidance.

## 2022-10-06 ENCOUNTER — OFFICE VISIT (OUTPATIENT)
Dept: SURGERY | Facility: CLINIC | Age: 32
End: 2022-10-06
Attending: SPECIALIST
Payer: COMMERCIAL

## 2022-10-06 VITALS — WEIGHT: 180 LBS | HEART RATE: 88 BPM | OXYGEN SATURATION: 99 % | BODY MASS INDEX: 29.99 KG/M2 | HEIGHT: 65 IN

## 2022-10-06 DIAGNOSIS — R19.03 ABDOMINAL SWELLING, RIGHT LOWER QUADRANT: ICD-10-CM

## 2022-10-06 PROCEDURE — 99999 PR PBB SHADOW E&M-EST. PATIENT-LVL III: ICD-10-PCS | Mod: PBBFAC,,, | Performed by: SPECIALIST

## 2022-10-06 PROCEDURE — 99999 PR PBB SHADOW E&M-EST. PATIENT-LVL III: CPT | Mod: PBBFAC,,, | Performed by: SPECIALIST

## 2022-10-06 NOTE — PROGRESS NOTES
Subjective:       Patient ID: Maryan Grady is a 31 y.o. female 2 weeks s/p lipo in [place] noted RLQ swelling .   Swelling stable since noted.       Chief Complaint: Other (Seroma )    HPI  Review of Systems     No pain or tenderness or sign of infection .    Objective:      Physical Exam      Firm, moderate swelling RLQ .   ? Seroma vs blood.     Assessment:       Problem List Items Addressed This Visit    None  Visit Diagnoses       Postoperative seroma involving circulatory system after non-circulatory procedure                  Plan:       Observe for now   RTC in 7-10 days if not resolving on it's own.

## 2022-10-10 ENCOUNTER — PATIENT MESSAGE (OUTPATIENT)
Dept: ADMINISTRATIVE | Facility: HOSPITAL | Age: 32
End: 2022-10-10
Payer: COMMERCIAL

## 2022-10-20 ENCOUNTER — OFFICE VISIT (OUTPATIENT)
Dept: ENDOCRINOLOGY | Facility: CLINIC | Age: 32
End: 2022-10-20
Payer: COMMERCIAL

## 2022-10-20 VITALS
TEMPERATURE: 99 F | OXYGEN SATURATION: 97 % | HEART RATE: 87 BPM | BODY MASS INDEX: 30.42 KG/M2 | DIASTOLIC BLOOD PRESSURE: 74 MMHG | SYSTOLIC BLOOD PRESSURE: 114 MMHG | WEIGHT: 180 LBS

## 2022-10-20 DIAGNOSIS — E04.2 MULTIPLE THYROID NODULES: Primary | ICD-10-CM

## 2022-10-20 DIAGNOSIS — D34 BENIGN FOLLICULAR TUMOR OF THYROID GLAND: ICD-10-CM

## 2022-10-20 PROBLEM — O26.899 PREGNANCY RELATED HIP PAIN, ANTEPARTUM: Status: RESOLVED | Noted: 2020-10-16 | Resolved: 2022-10-20

## 2022-10-20 PROBLEM — D64.9 ANEMIA: Status: RESOLVED | Noted: 2021-02-12 | Resolved: 2022-10-20

## 2022-10-20 PROBLEM — Z34.90 PREGNANCY WITH ONE FETUS, ANTEPARTUM: Status: RESOLVED | Noted: 2020-10-16 | Resolved: 2022-10-20

## 2022-10-20 PROBLEM — M25.559 PREGNANCY RELATED HIP PAIN, ANTEPARTUM: Status: RESOLVED | Noted: 2020-10-16 | Resolved: 2022-10-20

## 2022-10-20 PROBLEM — O99.891 BACK PAIN AFFECTING PREGNANCY, ANTEPARTUM: Status: RESOLVED | Noted: 2020-10-16 | Resolved: 2022-10-20

## 2022-10-20 PROBLEM — M54.9 BACK PAIN AFFECTING PREGNANCY, ANTEPARTUM: Status: RESOLVED | Noted: 2020-10-16 | Resolved: 2022-10-20

## 2022-10-20 PROCEDURE — 3044F HG A1C LEVEL LT 7.0%: CPT | Mod: CPTII,S$GLB,, | Performed by: INTERNAL MEDICINE

## 2022-10-20 PROCEDURE — 3074F PR MOST RECENT SYSTOLIC BLOOD PRESSURE < 130 MM HG: ICD-10-PCS | Mod: CPTII,S$GLB,, | Performed by: INTERNAL MEDICINE

## 2022-10-20 PROCEDURE — 99204 OFFICE O/P NEW MOD 45 MIN: CPT | Mod: S$GLB,,, | Performed by: INTERNAL MEDICINE

## 2022-10-20 PROCEDURE — 1160F RVW MEDS BY RX/DR IN RCRD: CPT | Mod: CPTII,S$GLB,, | Performed by: INTERNAL MEDICINE

## 2022-10-20 PROCEDURE — 1159F PR MEDICATION LIST DOCUMENTED IN MEDICAL RECORD: ICD-10-PCS | Mod: CPTII,S$GLB,, | Performed by: INTERNAL MEDICINE

## 2022-10-20 PROCEDURE — 3044F PR MOST RECENT HEMOGLOBIN A1C LEVEL <7.0%: ICD-10-PCS | Mod: CPTII,S$GLB,, | Performed by: INTERNAL MEDICINE

## 2022-10-20 PROCEDURE — 99999 PR PBB SHADOW E&M-EST. PATIENT-LVL IV: ICD-10-PCS | Mod: PBBFAC,,, | Performed by: INTERNAL MEDICINE

## 2022-10-20 PROCEDURE — 99204 PR OFFICE/OUTPT VISIT, NEW, LEVL IV, 45-59 MIN: ICD-10-PCS | Mod: S$GLB,,, | Performed by: INTERNAL MEDICINE

## 2022-10-20 PROCEDURE — 3078F DIAST BP <80 MM HG: CPT | Mod: CPTII,S$GLB,, | Performed by: INTERNAL MEDICINE

## 2022-10-20 PROCEDURE — 3078F PR MOST RECENT DIASTOLIC BLOOD PRESSURE < 80 MM HG: ICD-10-PCS | Mod: CPTII,S$GLB,, | Performed by: INTERNAL MEDICINE

## 2022-10-20 PROCEDURE — 99999 PR PBB SHADOW E&M-EST. PATIENT-LVL IV: CPT | Mod: PBBFAC,,, | Performed by: INTERNAL MEDICINE

## 2022-10-20 PROCEDURE — 1160F PR REVIEW ALL MEDS BY PRESCRIBER/CLIN PHARMACIST DOCUMENTED: ICD-10-PCS | Mod: CPTII,S$GLB,, | Performed by: INTERNAL MEDICINE

## 2022-10-20 PROCEDURE — 3074F SYST BP LT 130 MM HG: CPT | Mod: CPTII,S$GLB,, | Performed by: INTERNAL MEDICINE

## 2022-10-20 PROCEDURE — 1159F MED LIST DOCD IN RCRD: CPT | Mod: CPTII,S$GLB,, | Performed by: INTERNAL MEDICINE

## 2022-10-20 NOTE — ASSESSMENT & PLAN NOTE
2 cm nodule on the right.   no compressive symptoms   FNA with FLUS.    Reviewed ultrasound imaging. TR5 nodule, hypoechoic, some potential irregularirities around the margin, near the edge of the thyroid, concerning for cancer.     Discussed with patient that FLUS has about a 5-15% chance of being cancer.   based on imaging results and that biopsy, recommend consideration for lobectomy at least. Discussed briefly differences between lobectomy vs total thyroidectomy, potential need for levothyroxine vs definite need if total, etc.    Discussed alternatively if not interested in surgery, next best step would be a repeat FNA to re-evaluate. Though with imaging features even a benign FNA would not entirely resolve malignancy concerns.  Pt open to surgery.  Referral in  Check labs 6 weeks post-op to see how thyroid function is doing

## 2022-10-20 NOTE — PROGRESS NOTES
Subjective:      Chief Complaint: Thyroid Nodule    HPI: Maryan Grady is a 31 y.o. female who is here for an initial evaluation for thyroid.    With regards to the thyroid nodule:  The thyroid nodule was found on Exam. Pt noted neck swelling, had evaluation including US which confirmed nodules.    Last ultrasound: 4/19/2022. [facility]    Right mid 2.2 cm TR6  Left lower 5 mm    FNA done? 4/27/2022  Results: right mid -> FLUS    Lab Results   Component Value Date    TSH 2.373 08/31/2022    FREET4 1.04 08/31/2022    R8FGHKE 101 08/31/2022     Not taking biotin    +FH hypothyroidism (mother)    Relevant history:  No   Yes  [x]    []  FH thyroid cancer  [x]    []  Personal history of neck radiation    Relevant symptoms:  No   Yes  [x]    []  Trouble swallowing  [x]    []  Trouble breathing  [x]    []  Voice changes    [x]    []  Fatigue  [x]    []  Constipation/diarrhea  [x]    []  Heat/Cold intolerance  [x]    []  Weight gain or weight loss  [x]    []  Palpitations or tremor  [x]    []  Irregular menstrual cycles      Reviewed past medical, family, social history and updated as appropriate.    Review of Systems  As above    Objective:     Vitals:    10/20/22 1445   BP: 114/74   Pulse: 87   Temp: 98.9 °F (37.2 °C)     BP Readings from Last 5 Encounters:   10/20/22 114/74   10/03/22 118/73   08/22/22 100/80   04/13/22 94/64   02/07/22 124/76     Physical Exam  Vitals reviewed.   Constitutional:       General: She is not in acute distress.     Appearance: She is obese.   Neck:      Thyroid: Thyroid mass (right sided nodule) present. No thyroid tenderness.   Cardiovascular:      Heart sounds: Normal heart sounds.   Pulmonary:      Effort: Pulmonary effort is normal.   Musculoskeletal:         General: Swelling (bilateral legs) present.     Wt Readings from Last 5 Encounters:   10/20/22 1445 81.6 kg (180 lb)   10/06/22 1045 81.6 kg (180 lb)   10/03/22 1123 84.3 kg (185 lb 13.6 oz)   08/22/22 1306 87.3 kg (192 lb 7.4  oz)   04/13/22 1038 86.3 kg (190 lb 4.1 oz)     Lab Results   Component Value Date    HGBA1C 5.3 08/31/2022     No results found for: CHOL, HDL, LDLCALC, TRIG, CHOLHDL  Lab Results   Component Value Date     (L) 08/22/2022    K 3.9 08/22/2022     08/22/2022    CO2 26 08/22/2022    GLU 85 08/22/2022    BUN 10 08/22/2022    CREATININE 0.8 08/22/2022    CALCIUM 9.0 08/22/2022    PROT 6.6 08/22/2022    ALBUMIN 3.9 08/22/2022    BILITOT 0.5 08/22/2022    ALKPHOS 37 (L) 08/22/2022    AST 15 08/22/2022    ALT 14 08/22/2022    ANIONGAP 5 (L) 08/22/2022    ESTGFRAFRICA >60 08/06/2020    EGFRNONAA >60 08/06/2020    TSH 2.373 08/31/2022      No results found for: MICALBCREAT    Assessment/Plan:     Multiple thyroid nodules  2 cm nodule on the right.   no compressive symptoms   FNA with FLUS.    Reviewed ultrasound imaging. TR5 nodule, hypoechoic, some potential irregularirities around the margin, near the edge of the thyroid, concerning for cancer.     Discussed with patient that FLUS has about a 5-15% chance of being cancer.   based on imaging results and that biopsy, recommend consideration for lobectomy at least. Discussed briefly differences between lobectomy vs total thyroidectomy, potential need for levothyroxine vs definite need if total, etc.    Discussed alternatively if not interested in surgery, next best step would be a repeat FNA to re-evaluate. Though with imaging features even a benign FNA would not entirely resolve malignancy concerns.  Pt open to surgery.  Referral in  Check labs 6 weeks post-op to see how thyroid function is doing      Follow up in about 6 months (around 4/20/2023) for further monitoring, lab review.      Hayden Becerril MD  Endocrinology

## 2022-10-27 ENCOUNTER — PATIENT MESSAGE (OUTPATIENT)
Dept: PRIMARY CARE CLINIC | Facility: CLINIC | Age: 32
End: 2022-10-27
Payer: COMMERCIAL

## 2023-01-04 ENCOUNTER — TELEPHONE (OUTPATIENT)
Dept: OBSTETRICS AND GYNECOLOGY | Facility: CLINIC | Age: 33
End: 2023-01-04
Payer: COMMERCIAL

## 2023-01-04 NOTE — TELEPHONE ENCOUNTER
Lm for patienteliane appt is 3/15/23      ----- Message from Pierre Bo sent at 1/3/2023  4:49 PM CST -----  Please call pt her pcp is referral her to see  nothing is available that I can schedule [phone]

## 2023-01-18 DIAGNOSIS — Z00.00 ROUTINE GENERAL MEDICAL EXAMINATION AT A HEALTH CARE FACILITY: Primary | ICD-10-CM

## 2023-02-15 ENCOUNTER — OFFICE VISIT (OUTPATIENT)
Dept: PRIMARY CARE CLINIC | Facility: CLINIC | Age: 33
End: 2023-02-15
Payer: COMMERCIAL

## 2023-02-15 ENCOUNTER — PATIENT MESSAGE (OUTPATIENT)
Dept: SURGERY | Facility: CLINIC | Age: 33
End: 2023-02-15
Payer: COMMERCIAL

## 2023-02-15 VITALS
DIASTOLIC BLOOD PRESSURE: 62 MMHG | RESPIRATION RATE: 18 BRPM | WEIGHT: 184.5 LBS | TEMPERATURE: 98 F | BODY MASS INDEX: 30.74 KG/M2 | HEIGHT: 65 IN | OXYGEN SATURATION: 99 % | SYSTOLIC BLOOD PRESSURE: 115 MMHG | HEART RATE: 81 BPM

## 2023-02-15 DIAGNOSIS — E04.1 THYROID NODULE GREATER THAN OR EQUAL TO 1.5 CM IN DIAMETER INCIDENTALLY NOTED ON IMAGING STUDY: Primary | ICD-10-CM

## 2023-02-15 DIAGNOSIS — Z71.84 COUNSELING FOR TRAVEL: ICD-10-CM

## 2023-02-15 DIAGNOSIS — E04.2 MULTIPLE THYROID NODULES: ICD-10-CM

## 2023-02-15 PROCEDURE — 3078F PR MOST RECENT DIASTOLIC BLOOD PRESSURE < 80 MM HG: ICD-10-PCS | Mod: CPTII,S$GLB,, | Performed by: INTERNAL MEDICINE

## 2023-02-15 PROCEDURE — 99999 PR PBB SHADOW E&M-EST. PATIENT-LVL V: CPT | Mod: PBBFAC,,, | Performed by: INTERNAL MEDICINE

## 2023-02-15 PROCEDURE — 1159F MED LIST DOCD IN RCRD: CPT | Mod: CPTII,S$GLB,, | Performed by: INTERNAL MEDICINE

## 2023-02-15 PROCEDURE — 99999 PR PBB SHADOW E&M-EST. PATIENT-LVL V: ICD-10-PCS | Mod: PBBFAC,,, | Performed by: INTERNAL MEDICINE

## 2023-02-15 PROCEDURE — 3008F BODY MASS INDEX DOCD: CPT | Mod: CPTII,S$GLB,, | Performed by: INTERNAL MEDICINE

## 2023-02-15 PROCEDURE — 3074F PR MOST RECENT SYSTOLIC BLOOD PRESSURE < 130 MM HG: ICD-10-PCS | Mod: CPTII,S$GLB,, | Performed by: INTERNAL MEDICINE

## 2023-02-15 PROCEDURE — 3008F PR BODY MASS INDEX (BMI) DOCUMENTED: ICD-10-PCS | Mod: CPTII,S$GLB,, | Performed by: INTERNAL MEDICINE

## 2023-02-15 PROCEDURE — 1159F PR MEDICATION LIST DOCUMENTED IN MEDICAL RECORD: ICD-10-PCS | Mod: CPTII,S$GLB,, | Performed by: INTERNAL MEDICINE

## 2023-02-15 PROCEDURE — 3074F SYST BP LT 130 MM HG: CPT | Mod: CPTII,S$GLB,, | Performed by: INTERNAL MEDICINE

## 2023-02-15 PROCEDURE — 1160F RVW MEDS BY RX/DR IN RCRD: CPT | Mod: CPTII,S$GLB,, | Performed by: INTERNAL MEDICINE

## 2023-02-15 PROCEDURE — 3078F DIAST BP <80 MM HG: CPT | Mod: CPTII,S$GLB,, | Performed by: INTERNAL MEDICINE

## 2023-02-15 PROCEDURE — 99214 PR OFFICE/OUTPT VISIT, EST, LEVL IV, 30-39 MIN: ICD-10-PCS | Mod: S$GLB,,, | Performed by: INTERNAL MEDICINE

## 2023-02-15 PROCEDURE — 99214 OFFICE O/P EST MOD 30 MIN: CPT | Mod: S$GLB,,, | Performed by: INTERNAL MEDICINE

## 2023-02-15 PROCEDURE — 1160F PR REVIEW ALL MEDS BY PRESCRIBER/CLIN PHARMACIST DOCUMENTED: ICD-10-PCS | Mod: CPTII,S$GLB,, | Performed by: INTERNAL MEDICINE

## 2023-02-15 RX ORDER — CIPROFLOXACIN 500 MG/1
500 TABLET ORAL EVERY 12 HOURS
Qty: 14 TABLET | Refills: 0 | Status: SHIPPED | OUTPATIENT
Start: 2023-02-15 | End: 2023-02-22

## 2023-02-15 RX ORDER — ATOVAQUONE AND PROGUANIL HYDROCHLORIDE 250; 100 MG/1; MG/1
TABLET, FILM COATED ORAL
Qty: 20 TABLET | Refills: 0 | Status: SHIPPED | OUTPATIENT
Start: 2023-02-15 | End: 2023-06-02

## 2023-02-15 NOTE — PATIENT INSTRUCTIONS
Please call TRAVEL CLINIC with Infectious Disease [phone] or [facility] [place] [phone].to discuss appropriate vaccines - at least ONE MONTH prior to your trip.

## 2023-02-15 NOTE — PROGRESS NOTES
Subjective:      Patient ID: Maryan Grady is a 32 y.o. female.    Chief Complaint: Thyroid Problem    Multinodular thyroid nodule: Presents today to discuss thyroid nodule concerns. She was seen by an endocrinology and was recommended possible lobectomy after FNA which revealed FLUS. She reports that she is concerned in regards to next step. She is undecided whether lobectomy is something that she would want. She reports that she is concerned in regards to her thyroid nodule and is concerned that she would need to resolve it sooner. Would recommend consult with endocrine surgery at this time.    Travel to [place]: Patient reports that she will be traveling to [place] in 5 days. She is hoping to get advice as far as travel. Will need malaria prophylaxis which is ordered for her at this time. Will give ciprofloxacin for 7 days for traveller's diarrhea prophylaxis. She is referred to travel clinic which she will be going tomorrow.     Denies any chest pain, shortness of breath, nausea vomiting constipation diarrhea, blood in stool, heartburn    Review of Systems   Constitutional:  Negative for chills, fever and weight loss.   HENT:  Negative for congestion, ear pain and sore throat.    Eyes:  Negative for double vision.   Respiratory:  Negative for cough and shortness of breath.    Cardiovascular:  Negative for chest pain, palpitations and leg swelling.   Gastrointestinal:  Negative for abdominal pain, heartburn, nausea and vomiting.   Skin:  Negative for rash.   Neurological:  Negative for dizziness, tingling and headaches.   Psychiatric/Behavioral:  Negative for depression.        Current Outpatient Medications:     atovaquone-proguaniL (MALARONE) 250-100 mg Tab, Take 1 tablet with food 1-2 days prior to exposure; take 1 tablet once a day during exposure; take 1 tablet once a day for 7 days after returning., Disp: 20 tablet, Rfl: 0    ciprofloxacin HCl (CIPRO) 500 MG tablet, Take 1 tablet (500 mg total) by  "mouth every 12 (twelve) hours. for 7 days, Disp: 14 tablet, Rfl: 0    fluticasone propionate (FLONASE) 50 mcg/actuation nasal spray, USE 1 SPRAY IN EACH NOSTRIL ONCE DAILY (Patient not taking: No sig reported), Disp: 48 g, Rfl: 1    ibuprofen (ADVIL,MOTRIN) 800 MG tablet, Take 1 tablet (800 mg total) by mouth every 6 (six) hours as needed for Pain (throat and neck). (Patient not taking: No sig reported), Disp: 4 tablet, Rfl: 0    Lab Results   Component Value Date    HGBA1C 5.3 08/31/2022     No results found for: MICALBCREAT  No results found for: LDLCALC, CHOL, HDL, TRIG    Lab Results   Component Value Date     (L) 08/22/2022    K 3.9 08/22/2022     08/22/2022    CO2 26 08/22/2022    GLU 85 08/22/2022    BUN 10 08/22/2022    CREATININE 0.8 08/22/2022    CALCIUM 9.0 08/22/2022    PROT 6.6 08/22/2022    ALBUMIN 3.9 08/22/2022    BILITOT 0.5 08/22/2022    ALKPHOS 37 (L) 08/22/2022    AST 15 08/22/2022    ALT 14 08/22/2022    ANIONGAP 5 (L) 08/22/2022    ESTGFRAFRICA >60 08/06/2020    EGFRNONAA >60 08/06/2020    WBC 4.30 09/06/2022    HGB 12.1 09/06/2022    HGB 10.9 (L) 08/22/2022    HCT 36.3 (L) 09/06/2022    MCV 84 09/06/2022     09/06/2022    TSH 2.373 08/31/2022       No results found for: LH, FSH, TOTALTESTOST, PROGESTERONE, ESTRADIOL, [id number], [id number], FERRITIN, IRON, TRANSFERRIN, TIBC, FESATURATED, ZINC      Past Medical History:   Diagnosis Date    GERD (gastroesophageal reflux disease)      Past Surgical History:   Procedure Laterality Date    EYE SURGERY      strabismus     Social History     Social History Narrative    Not on file     Family History   Problem Relation Age of Onset    Hypothyroidism Mother     Hypertension Father     Melanoma Neg Hx     Breast cancer Neg Hx     Colon cancer Neg Hx     Ovarian cancer Neg Hx      Vitals:    02/15/23 1345   BP: 115/62   Pulse: 81   Resp: 18   Temp: 98 °F (36.7 °C)   SpO2: 99%   Weight: 83.7 kg (184 lb 8.4 oz)   Height: 5' 4.5" (1.638 " m)   PainSc: 0-No pain     Objective:   Physical Exam  Vitals reviewed.   Constitutional:       Appearance: Normal appearance.   HENT:      Head: Normocephalic.   Eyes:      Pupils: Pupils are equal, round, and reactive to light.   Cardiovascular:      Rate and Rhythm: Normal rate.      Pulses: Normal pulses.      Heart sounds: Normal heart sounds.   Pulmonary:      Effort: Pulmonary effort is normal.      Breath sounds: Normal breath sounds.   Abdominal:      General: Bowel sounds are normal.      Palpations: Abdomen is soft.   Musculoskeletal:         General: Normal range of motion.   Skin:     General: Skin is warm.   Neurological:      General: No focal deficit present.      Mental Status: She is alert. Mental status is at baseline.   Psychiatric:         Mood and Affect: Mood normal.     Assessment:     1. Thyroid nodule greater than or equal to 1.5 cm in diameter incidentally noted on imaging study    2. Multiple thyroid nodules    3. Counseling for travel      Plan:     Orders Placed This Encounter    Ambulatory referral/consult to Endocrine Surgery    ciprofloxacin HCl (CIPRO) 500 MG tablet    atovaquone-proguaniL (MALARONE) 250-100 mg Tab       Patient Instructions   Please call TRAVEL CLINIC with Infectious Disease [phone] or [facility] ([place]) [phone].to discuss appropriate vaccines - at least ONE MONTH prior to your trip.    Tests to Keep You Healthy    Cervical Cancer Screening: DUE

## 2023-03-11 ENCOUNTER — PATIENT MESSAGE (OUTPATIENT)
Dept: SURGERY | Facility: CLINIC | Age: 33
End: 2023-03-11
Payer: COMMERCIAL

## 2023-03-13 ENCOUNTER — PATIENT MESSAGE (OUTPATIENT)
Dept: PRIMARY CARE CLINIC | Facility: CLINIC | Age: 33
End: 2023-03-13
Payer: COMMERCIAL

## 2023-03-14 ENCOUNTER — OFFICE VISIT (OUTPATIENT)
Dept: OBSTETRICS AND GYNECOLOGY | Facility: CLINIC | Age: 33
End: 2023-03-14
Payer: COMMERCIAL

## 2023-03-14 VITALS
BODY MASS INDEX: 30.85 KG/M2 | SYSTOLIC BLOOD PRESSURE: 116 MMHG | DIASTOLIC BLOOD PRESSURE: 62 MMHG | HEIGHT: 65 IN | WEIGHT: 185.19 LBS

## 2023-03-14 DIAGNOSIS — N89.8 VAGINAL DISCHARGE: Primary | ICD-10-CM

## 2023-03-14 LAB
CANDIDA RRNA VAG QL PROBE: POSITIVE
G VAGINALIS RRNA GENITAL QL PROBE: POSITIVE
T VAGINALIS RRNA GENITAL QL PROBE: NEGATIVE

## 2023-03-14 PROCEDURE — 99999 PR PBB SHADOW E&M-EST. PATIENT-LVL III: CPT | Mod: PBBFAC,,, | Performed by: ADVANCED PRACTICE MIDWIFE

## 2023-03-14 PROCEDURE — 87480 CANDIDA DNA DIR PROBE: CPT | Performed by: ADVANCED PRACTICE MIDWIFE

## 2023-03-14 PROCEDURE — 3074F PR MOST RECENT SYSTOLIC BLOOD PRESSURE < 130 MM HG: ICD-10-PCS | Mod: CPTII,S$GLB,, | Performed by: ADVANCED PRACTICE MIDWIFE

## 2023-03-14 PROCEDURE — 1159F MED LIST DOCD IN RCRD: CPT | Mod: CPTII,S$GLB,, | Performed by: ADVANCED PRACTICE MIDWIFE

## 2023-03-14 PROCEDURE — 3008F PR BODY MASS INDEX (BMI) DOCUMENTED: ICD-10-PCS | Mod: CPTII,S$GLB,, | Performed by: ADVANCED PRACTICE MIDWIFE

## 2023-03-14 PROCEDURE — 3074F SYST BP LT 130 MM HG: CPT | Mod: CPTII,S$GLB,, | Performed by: ADVANCED PRACTICE MIDWIFE

## 2023-03-14 PROCEDURE — 3008F BODY MASS INDEX DOCD: CPT | Mod: CPTII,S$GLB,, | Performed by: ADVANCED PRACTICE MIDWIFE

## 2023-03-14 PROCEDURE — 87591 N.GONORRHOEAE DNA AMP PROB: CPT | Performed by: ADVANCED PRACTICE MIDWIFE

## 2023-03-14 PROCEDURE — 99999 PR PBB SHADOW E&M-EST. PATIENT-LVL III: ICD-10-PCS | Mod: PBBFAC,,, | Performed by: ADVANCED PRACTICE MIDWIFE

## 2023-03-14 PROCEDURE — 1159F PR MEDICATION LIST DOCUMENTED IN MEDICAL RECORD: ICD-10-PCS | Mod: CPTII,S$GLB,, | Performed by: ADVANCED PRACTICE MIDWIFE

## 2023-03-14 PROCEDURE — 3078F PR MOST RECENT DIASTOLIC BLOOD PRESSURE < 80 MM HG: ICD-10-PCS | Mod: CPTII,S$GLB,, | Performed by: ADVANCED PRACTICE MIDWIFE

## 2023-03-14 PROCEDURE — 3078F DIAST BP <80 MM HG: CPT | Mod: CPTII,S$GLB,, | Performed by: ADVANCED PRACTICE MIDWIFE

## 2023-03-14 PROCEDURE — 99212 OFFICE O/P EST SF 10 MIN: CPT | Mod: S$GLB,,, | Performed by: ADVANCED PRACTICE MIDWIFE

## 2023-03-14 PROCEDURE — 99212 PR OFFICE/OUTPT VISIT, EST, LEVL II, 10-19 MIN: ICD-10-PCS | Mod: S$GLB,,, | Performed by: ADVANCED PRACTICE MIDWIFE

## 2023-03-14 RX ORDER — FLUCONAZOLE 200 MG/1
200 TABLET ORAL EVERY OTHER DAY
Qty: 2 TABLET | Refills: 0 | Status: SHIPPED | OUTPATIENT
Start: 2023-03-14 | End: 2023-03-17

## 2023-03-14 NOTE — PROGRESS NOTES
Maryan Grady is a 32 y.o. female  presents to  [facility] with complaint of vaginal discharge .  Pt reports itching as the major symptom and she used a 3 day Moniastat cream topically.       ROS:  GENERAL: No fever, chills, fatigability or weight loss.  VULVAR: No pain, no lesions and no itching.  VAGINAL: No relaxation, no abnormal bleeding and no lesions.  ABDOMEN: No abdominal pain. Denies nausea. Denies vomiting. No diarrhea. No constipation  URINARY: No incontinence, no nocturia, no frequency and no dysuria.      Review of patient's allergies indicates:   Allergen Reactions    Penicillins Rash       Current Outpatient Medications:     atovaquone-proguaniL (MALARONE) 250-100 mg Tab, Take 1 tablet with food 1-2 days prior to exposure; take 1 tablet once a day during exposure; take 1 tablet once a day for 7 days after returning. (Patient not taking: Reported on 3/14/2023), Disp: 20 tablet, Rfl: 0    fluconazole (DIFLUCAN) 200 MG Tab, Take 1 tablet (200 mg total) by mouth every other day. for 2 doses, Disp: 2 tablet, Rfl: 0    fluticasone propionate (FLONASE) 50 mcg/actuation nasal spray, USE 1 SPRAY IN EACH NOSTRIL ONCE DAILY (Patient not taking: Reported on 2022), Disp: 48 g, Rfl: 1    ibuprofen (ADVIL,MOTRIN) 800 MG tablet, Take 1 tablet (800 mg total) by mouth every 6 (six) hours as needed for Pain (throat and neck). (Patient not taking: Reported on 2022), Disp: 4 tablet, Rfl: 0    Past Medical History:   Diagnosis Date    GERD (gastroesophageal reflux disease)      Past Surgical History:   Procedure Laterality Date    EYE SURGERY      strabismus     Social History     Tobacco Use    Smoking status: Never    Smokeless tobacco: Never   Substance Use Topics    Alcohol use: Not Currently     Comment: Only drink socially once a month if that    Drug use: No     OB History    Para Term  AB Living   3 3 3     3   SAB IAB Ectopic Multiple Live Births         0 3      #  "Outcome Date GA Lbr Ruslan/2nd Weight Sex Delivery Anes PTL Lv   3 Term 02/11/21 40w0d  3.232 kg (7 lb 2 oz) F Vag-Spont EPI N NELLY   2 Term 02/08/19 39w3d  3.118 kg (6 lb 14 oz) M Vag-Spont EPI N NELLY   1 Term 2013 40w3d  3.629 kg (8 lb) M Vag-Spont EPI  NELLY       /62   Ht 5' 4.5" (1.638 m)   Wt 84 kg (185 lb 3 oz)   LMP 02/19/2023 (Approximate)   Breastfeeding No   BMI 31.30 kg/m²     PHYSICAL EXAM:  GENERAL: Calm and appropriate affect, alert, oriented x4  SKIN: Color appropriate for race, warm and dry, clean and intact with no rashes.  RESP: Even, unlabored breathing  ABDOMEN: Soft, nontender, no masses.  :   Normal external female genitalia without lesions. Normal hair distribution. Adequate perineal body, normal urethral meatus.  Vagina pink and well rugated, no lesions, vaginal discharge - white, creamy, thick with no foul odor.   No significant cystocele or rectocele.  Cervix -no cervical motion tenderness.      ASSESSMENT / PLAN:    ICD-10-CM ICD-9-CM    1. Vaginal discharge  N89.8 623.5 Vaginosis Screen by DNA Probe      C. trachomatis/N. gonorrhoeae by AMP DNA      fluconazole (DIFLUCAN) 200 MG Tab        Vaginal discharge  -     Vaginosis Screen by DNA Probe  -     C. trachomatis/N. gonorrhoeae by AMP DNA  -     fluconazole (DIFLUCAN) 200 MG Tab; Take 1 tablet (200 mg total) by mouth every other day. for 2 doses  Dispense: 2 tablet; Refill: 0          Patient was counseled today on vaginitis prevention including :  a. avoiding feminine products such as deoderant soaps, body wash, bubble bath, douches, scented toilet paper, deoderant tampons or pads, feminine wipes, chronic pad use, etc.  b. avoiding other vulvovaginal irritants such as long hot baths, humidity, tight, synthetic clothing, chlorine and sitting around in wet bathing suits  c. wearing cotton underwear, avoiding thong underwear and no underwear to bed  d. taking showers instead of baths and use a hair dryer on cool setting afterwards " to dry  e. wearing cotton to exercise and shower immediately after exercise and change clothes  f. using polyurethane condoms without spermicide if sexually active and symptoms are triggered by intercourse  g. Discussed use of vagisil, along with repHresh and probiotics    FOLLOW UP:   Pending lab results, PRN lack of improvement.

## 2023-03-15 ENCOUNTER — PATIENT MESSAGE (OUTPATIENT)
Dept: OBSTETRICS AND GYNECOLOGY | Facility: CLINIC | Age: 33
End: 2023-03-15
Payer: COMMERCIAL

## 2023-03-15 ENCOUNTER — TELEPHONE (OUTPATIENT)
Dept: SURGERY | Facility: CLINIC | Age: 33
End: 2023-03-15
Payer: COMMERCIAL

## 2023-03-15 DIAGNOSIS — B96.89 BV (BACTERIAL VAGINOSIS): Primary | ICD-10-CM

## 2023-03-15 DIAGNOSIS — N76.0 BV (BACTERIAL VAGINOSIS): Primary | ICD-10-CM

## 2023-03-15 RX ORDER — METRONIDAZOLE 500 MG/1
500 TABLET ORAL 2 TIMES DAILY
Qty: 14 TABLET | Refills: 0 | Status: SHIPPED | OUTPATIENT
Start: 2023-03-15 | End: 2023-03-23 | Stop reason: ALTCHOICE

## 2023-03-16 LAB
C TRACH DNA SPEC QL NAA+PROBE: NOT DETECTED
N GONORRHOEA DNA SPEC QL NAA+PROBE: NOT DETECTED

## 2023-03-20 NOTE — PROGRESS NOTES
Endocrine Surgery History & Physical     REFERRING PROVIDER: Hayden Becerril MD; Tita Gonzalez MD    REASON FOR VISIT: Right 2.2 cm thyroid nodule    HPI: Maryan Grady is a 32 y.o. otherwise healthy female who presents in consultation for management of thyroid nodule.     She was found to have a thyroid nodule in 4/19/22.  Biochemical testing of thyroid function including a TSH documented normal. Thyroid ultrasound revealed 2.2cm right mid thyroid nodule TR6.  Ultrasound-guided fine-needle aspiration biopsy revealed FLUS [place] III cytology.    The patient denies hyperthyroid or hypothyroid symptoms.  She has noticed a bulge in her right neck but denies dysphagia, globus sensation, compression symptoms or anterior neck pain.  The patient has no hoarseness, voice changes or increased need to clear the throat.  The patient has not had prior neck surgery.      The patient has no history of radiation exposure or goitrogens.  The patient has not recently been on lithium, biotin, amiodarone or received iodine contrast.  There not a significant history of thyroid cancer, thyroid disease or familial endocrinopathies.  Mother has hypothyroidism.    Calcium levels have been normal and she denies symptoms of hyperparathyroidism, therefore is likely low risk for concurrent parathyroid disease.      LABORATORY STUDIES:  I personally and independently reviewed relevant lab test results, including the following:    TSH   Date Value Ref Range Status   08/31/2022 2.373 0.400 - 4.000 uIU/mL Final   04/13/2022 1.670 0.400 - 4.000 uIU/mL Final     Free T4   Date Value Ref Range Status   08/31/2022 1.04 0.71 - 1.51 ng/dL Final   04/13/2022 0.97 0.71 - 1.51 ng/dL Final     Calcium   Date Value Ref Range Status   08/22/2022 9.0 8.7 - 10.5 mg/dL Final   08/06/2020 9.3 8.7 - 10.5 mg/dL Final     Albumin   Date Value Ref Range Status   08/22/2022 3.9 3.5 - 5.2 g/dL Final        PAST MEDICAL HISTORY:  Patient Active Problem  List   Diagnosis    Acute pharyngitis    Neck pain    Multiple thyroid nodules        PAST SURGICAL HISTORY:  Past Surgical History:   Procedure Laterality Date    EYE SURGERY      strabismus        MEDICATIONS:  Current Outpatient Medications   Medication Sig Dispense Refill    atovaquone-proguaniL (MALARONE) 250-100 mg Tab Take 1 tablet with food 1-2 days prior to exposure; take 1 tablet once a day during exposure; take 1 tablet once a day for 7 days after returning. (Patient not taking: Reported on 3/14/2023) 20 tablet 0    fluticasone propionate (FLONASE) 50 mcg/actuation nasal spray USE 1 SPRAY IN EACH NOSTRIL ONCE DAILY (Patient not taking: Reported on 8/22/2022) 48 g 1    ibuprofen (ADVIL,MOTRIN) 800 MG tablet Take 1 tablet (800 mg total) by mouth every 6 (six) hours as needed for Pain (throat and neck). (Patient not taking: Reported on 8/22/2022) 4 tablet 0     No current facility-administered medications for this visit.       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 No         FAMILY HISTORY:  Family History   Problem Relation Age of Onset    Hypothyroidism Mother     Hypertension Father     Melanoma Neg Hx     Breast cancer Neg Hx     Colon cancer Neg Hx     Ovarian cancer Neg Hx          REVIEW OF SYSTEMS:  A detailed review of systems was reviewed with the patient, pertinent positives and negatives are presented in the note and is  "otherwise negative.    PHYSICAL EXAMINATION:  Vital Signs: /66 (BP Location: Left arm, Patient Position: Sitting, BP Method: Medium (Automatic))   Pulse 84   Resp 18   Ht 5' 4" (1.626 m)   Wt 81.2 kg (179 lb 0.2 oz)   LMP 03/22/2023 (Approximate)   SpO2 98%   BMI 30.73 kg/m²     Constitutional: no acute distress, comfortable, well appearing  HENT: no lid lag, no exophthalmos, no scleral icterus, moist mucous membranes, normal dentition  Neck: supple, trachea in midline, thyroid is palpable and moves well with swallowing, right thyroid nodule palpable, normal neck extension  Heme/Lymph: no cervical or supraclavicular lymphadenopathy  Respiratory: normal respiratory effort, no wheezes or stridor  Cardiovascular: regular rate and rhythm  Extremities: no edema  Skin: warm and dry, no rashes  Neurologic: no resting tremor of outstretched hands, voice normal  Vascular: radial pulses palpable bilaterally  Psychiatric: affect normal    IMAGING STUDIES:  I personally and independently reviewed, visualized and interpreted the images of the below listed radiology studies (including US) and my findings are notable for 2.2cm thyroid nodule of the right lobe with irregular margins and possible extrathyroidal extension, no suspicious lymphadenopathy, small sub-centimeter left thyroid nodule.  Reports below for reference.    US Soft Tissue Head Neck Thyroid 04/19/2022  Impression  Right midpole 2.2 cm thyroid nodule, correlating with the palpable abnormality.  FNA biopsy recommended per ACR criteria.    CYTOLOGY:  Final Pathologic Diagnosis   Date Value Ref Range Status   04/27/2022 (A)  Final    [place] [facility] Thyroid Cytology Category: Follicular Lesion of Undetermined  Significance (FLUS)  Follicular cells, some with Hurthle cell change, in a predominantly  microfollicular arrangement with scant colloid, most consistent with a  follicular lesion of undetermined significance.       Comment:     Interp By ZANDRA " Joanna Campoverde MD, Signed on 05/06/2022 at 09:39       IMPRESSION:  I had the pleasure of seeing Ms. Grady in endocrine surgical consultation regarding her thyroid nodule.  I have discussed with Ms. Grady at length regarding the current surgical and non-surgical treatment options.  Based on the current clinical findings and patient's preference I recommend a right thryoid lobectomy.     The patient has a thyroid nodule showing FLUS. I discussed with the patient the risk of malignancy with this diagnosis, ranging from 5-15%%.   Notably, the nodule does appear to have an irregular anterior margin and possible extrathyroidal extension and thus fine needle aspiration cannot determine the risk of malignancy with more accuracy. In order for us to determine whether this is malignant or benign, surgical resection is recommended.  I recommend a diagnostic right thyroid lobectomy.  The patient understood that once final pathology will be obtained, if this documents significant malignancy, a completion thyroidectomy may be necessary. This would be done at a later point.    I discussed with the patient the expected perioperative course. A thyroid lobectomy can be performed as an outpatient, if the patient tolerates it well.  The possible complications associated with this may include, but may not be restricted to hoarseness, recurrent laryngeal nerve or superior laryngeal nerve injury - temporary or permanent, neck hematoma, infection, scarring, death and imponderables.  Hypocalcemia would be a risk if the patient would need a total or completion thyroidectomy.  The patient understood that thyroid hormone replacement may be necessary even after a thyroid lobectomy, and definitely after a total or completion thyroidectomy. Thyroid function will need to be monitored.     All questions were answered and the patient expressed understanding of all the risks, benefits and alternatives, and agreed to proceed with the plan  despite the risks.  Surgical consent was signed and witnessed.    Problem List Items Addressed This Visit          Endocrine    Multiple thyroid nodules     Ms. Grady is our 31yo female who presents for a 2.2cm right thyroid nodule that is TR6 and FLUS cytology.  Given its imaging characteristics and biopsy results, I recommend a right thyroid lobectomy for final pathologic diagnosis.  There is a small sub-centimeter left thyroid nodule without suspicious features.     - Consent obtained in clinic today, all questions and concerns addressed  - OR for right thyroid lobectomy  - Preop labs: CBC, CMP, vit D          Other Visit Diagnoses       Thyroid nodule greater than or equal to 1.5 cm in diameter incidentally noted on imaging study            Patient was seen and evaluated with surgery resident Juan Manuel Short MD.    Zaira Valera MD  [facility] Surgeon  Endocrine Surgery  3/23/23

## 2023-03-22 ENCOUNTER — TELEPHONE (OUTPATIENT)
Dept: SURGERY | Facility: CLINIC | Age: 33
End: 2023-03-22
Payer: COMMERCIAL

## 2023-03-23 ENCOUNTER — OFFICE VISIT (OUTPATIENT)
Dept: SURGERY | Facility: CLINIC | Age: 33
End: 2023-03-23
Payer: COMMERCIAL

## 2023-03-23 ENCOUNTER — LAB VISIT (OUTPATIENT)
Dept: LAB | Facility: OTHER | Age: 33
End: 2023-03-23
Attending: STUDENT IN AN ORGANIZED HEALTH CARE EDUCATION/TRAINING PROGRAM
Payer: COMMERCIAL

## 2023-03-23 VITALS
OXYGEN SATURATION: 98 % | DIASTOLIC BLOOD PRESSURE: 66 MMHG | HEIGHT: 64 IN | RESPIRATION RATE: 18 BRPM | SYSTOLIC BLOOD PRESSURE: 108 MMHG | WEIGHT: 179 LBS | BODY MASS INDEX: 30.56 KG/M2 | HEART RATE: 84 BPM

## 2023-03-23 DIAGNOSIS — E04.1 THYROID NODULE GREATER THAN OR EQUAL TO 1.5 CM IN DIAMETER INCIDENTALLY NOTED ON IMAGING STUDY: ICD-10-CM

## 2023-03-23 DIAGNOSIS — E04.2 MULTIPLE THYROID NODULES: Primary | ICD-10-CM

## 2023-03-23 DIAGNOSIS — E04.2 MULTIPLE THYROID NODULES: ICD-10-CM

## 2023-03-23 LAB
25(OH)D3+25(OH)D2 SERPL-MCNC: 15 NG/ML (ref 30–96)
ALBUMIN SERPL BCP-MCNC: 4 G/DL (ref 3.5–5.2)
ALP SERPL-CCNC: 43 U/L (ref 55–135)
ALT SERPL W/O P-5'-P-CCNC: 11 U/L (ref 10–44)
ANION GAP SERPL CALC-SCNC: 5 MMOL/L (ref 8–16)
AST SERPL-CCNC: 14 U/L (ref 10–40)
BASOPHILS # BLD AUTO: 0.03 K/UL (ref 0–0.2)
BASOPHILS NFR BLD: 0.7 % (ref 0–1.9)
BILIRUB SERPL-MCNC: 0.4 MG/DL (ref 0.1–1)
BUN SERPL-MCNC: 14 MG/DL (ref 6–20)
CALCIUM SERPL-MCNC: 9.2 MG/DL (ref 8.7–10.5)
CHLORIDE SERPL-SCNC: 105 MMOL/L (ref 95–110)
CO2 SERPL-SCNC: 26 MMOL/L (ref 23–29)
CREAT SERPL-MCNC: 0.9 MG/DL (ref 0.5–1.4)
DIFFERENTIAL METHOD: ABNORMAL
EOSINOPHIL # BLD AUTO: 0.1 K/UL (ref 0–0.5)
EOSINOPHIL NFR BLD: 1.6 % (ref 0–8)
ERYTHROCYTE [DISTWIDTH] IN BLOOD BY AUTOMATED COUNT: 14.3 % (ref 11.5–14.5)
EST. GFR  (NO RACE VARIABLE): >60 ML/MIN/1.73 M^2
GLUCOSE SERPL-MCNC: 90 MG/DL (ref 70–110)
HCT VFR BLD AUTO: 37.6 % (ref 37–48.5)
HGB BLD-MCNC: 12 G/DL (ref 12–16)
IMM GRANULOCYTES # BLD AUTO: 0.02 K/UL (ref 0–0.04)
IMM GRANULOCYTES NFR BLD AUTO: 0.5 % (ref 0–0.5)
LYMPHOCYTES # BLD AUTO: 2 K/UL (ref 1–4.8)
LYMPHOCYTES NFR BLD: 46.9 % (ref 18–48)
MCH RBC QN AUTO: 27.8 PG (ref 27–31)
MCHC RBC AUTO-ENTMCNC: 31.9 G/DL (ref 32–36)
MCV RBC AUTO: 87 FL (ref 82–98)
MONOCYTES # BLD AUTO: 0.3 K/UL (ref 0.3–1)
MONOCYTES NFR BLD: 6.3 % (ref 4–15)
NEUTROPHILS # BLD AUTO: 1.9 K/UL (ref 1.8–7.7)
NEUTROPHILS NFR BLD: 44 % (ref 38–73)
NRBC BLD-RTO: 0 /100 WBC
PLATELET # BLD AUTO: 320 K/UL (ref 150–450)
PMV BLD AUTO: 10 FL (ref 9.2–12.9)
POTASSIUM SERPL-SCNC: 4.6 MMOL/L (ref 3.5–5.1)
PROT SERPL-MCNC: 7.3 G/DL (ref 6–8.4)
RBC # BLD AUTO: 4.32 M/UL (ref 4–5.4)
SODIUM SERPL-SCNC: 136 MMOL/L (ref 136–145)
WBC # BLD AUTO: 4.31 K/UL (ref 3.9–12.7)

## 2023-03-23 PROCEDURE — 3078F PR MOST RECENT DIASTOLIC BLOOD PRESSURE < 80 MM HG: ICD-10-PCS | Mod: CPTII,S$GLB,, | Performed by: STUDENT IN AN ORGANIZED HEALTH CARE EDUCATION/TRAINING PROGRAM

## 2023-03-23 PROCEDURE — 99215 PR OFFICE/OUTPT VISIT, EST, LEVL V, 40-54 MIN: ICD-10-PCS | Mod: S$GLB,,, | Performed by: STUDENT IN AN ORGANIZED HEALTH CARE EDUCATION/TRAINING PROGRAM

## 2023-03-23 PROCEDURE — 3074F PR MOST RECENT SYSTOLIC BLOOD PRESSURE < 130 MM HG: ICD-10-PCS | Mod: CPTII,S$GLB,, | Performed by: STUDENT IN AN ORGANIZED HEALTH CARE EDUCATION/TRAINING PROGRAM

## 2023-03-23 PROCEDURE — 3008F BODY MASS INDEX DOCD: CPT | Mod: CPTII,S$GLB,, | Performed by: STUDENT IN AN ORGANIZED HEALTH CARE EDUCATION/TRAINING PROGRAM

## 2023-03-23 PROCEDURE — 82306 VITAMIN D 25 HYDROXY: CPT | Performed by: STUDENT IN AN ORGANIZED HEALTH CARE EDUCATION/TRAINING PROGRAM

## 2023-03-23 PROCEDURE — 36415 COLL VENOUS BLD VENIPUNCTURE: CPT | Performed by: STUDENT IN AN ORGANIZED HEALTH CARE EDUCATION/TRAINING PROGRAM

## 2023-03-23 PROCEDURE — 99215 OFFICE O/P EST HI 40 MIN: CPT | Mod: S$GLB,,, | Performed by: STUDENT IN AN ORGANIZED HEALTH CARE EDUCATION/TRAINING PROGRAM

## 2023-03-23 PROCEDURE — 3008F PR BODY MASS INDEX (BMI) DOCUMENTED: ICD-10-PCS | Mod: CPTII,S$GLB,, | Performed by: STUDENT IN AN ORGANIZED HEALTH CARE EDUCATION/TRAINING PROGRAM

## 2023-03-23 PROCEDURE — 85025 COMPLETE CBC W/AUTO DIFF WBC: CPT | Performed by: STUDENT IN AN ORGANIZED HEALTH CARE EDUCATION/TRAINING PROGRAM

## 2023-03-23 PROCEDURE — 80053 COMPREHEN METABOLIC PANEL: CPT | Performed by: STUDENT IN AN ORGANIZED HEALTH CARE EDUCATION/TRAINING PROGRAM

## 2023-03-23 PROCEDURE — 1159F PR MEDICATION LIST DOCUMENTED IN MEDICAL RECORD: ICD-10-PCS | Mod: CPTII,S$GLB,, | Performed by: STUDENT IN AN ORGANIZED HEALTH CARE EDUCATION/TRAINING PROGRAM

## 2023-03-23 PROCEDURE — 3074F SYST BP LT 130 MM HG: CPT | Mod: CPTII,S$GLB,, | Performed by: STUDENT IN AN ORGANIZED HEALTH CARE EDUCATION/TRAINING PROGRAM

## 2023-03-23 PROCEDURE — 1159F MED LIST DOCD IN RCRD: CPT | Mod: CPTII,S$GLB,, | Performed by: STUDENT IN AN ORGANIZED HEALTH CARE EDUCATION/TRAINING PROGRAM

## 2023-03-23 PROCEDURE — 3078F DIAST BP <80 MM HG: CPT | Mod: CPTII,S$GLB,, | Performed by: STUDENT IN AN ORGANIZED HEALTH CARE EDUCATION/TRAINING PROGRAM

## 2023-03-23 NOTE — ASSESSMENT & PLAN NOTE
Ms. Grady is our 31yo female who presents for a 2.2cm right thyroid nodule that is TR6 and FLUS cytology.  Given its imaging characteristics and biopsy results, I recommend a right thyroid lobectomy for final pathologic diagnosis.  There is a small sub-centimeter left thyroid nodule without suspicious features.     - Consent obtained in clinic today, all questions and concerns addressed  - OR for right thyroid lobectomy  - Preop labs: CBC, CMP, vit D

## 2023-04-03 ENCOUNTER — PATIENT MESSAGE (OUTPATIENT)
Dept: ADMINISTRATIVE | Facility: HOSPITAL | Age: 33
End: 2023-04-03
Payer: COMMERCIAL

## 2023-04-06 DIAGNOSIS — E04.2 MULTIPLE THYROID NODULES: Primary | ICD-10-CM

## 2023-04-06 RX ORDER — SODIUM CHLORIDE 9 MG/ML
INJECTION, SOLUTION INTRAVENOUS CONTINUOUS
Status: CANCELLED | OUTPATIENT
Start: 2023-04-06

## 2023-04-06 RX ORDER — ONDANSETRON 2 MG/ML
4 INJECTION INTRAMUSCULAR; INTRAVENOUS EVERY 12 HOURS PRN
Status: CANCELLED | OUTPATIENT
Start: 2023-04-06

## 2023-04-06 RX ORDER — LIDOCAINE HYDROCHLORIDE 10 MG/ML
1 INJECTION, SOLUTION EPIDURAL; INFILTRATION; INTRACAUDAL; PERINEURAL ONCE
Status: CANCELLED | OUTPATIENT
Start: 2023-04-06 | End: 2023-04-06

## 2023-04-10 ENCOUNTER — ANESTHESIA EVENT (OUTPATIENT)
Dept: SURGERY | Facility: HOSPITAL | Age: 33
End: 2023-04-10
Payer: COMMERCIAL

## 2023-04-10 ENCOUNTER — TELEPHONE (OUTPATIENT)
Dept: SURGERY | Facility: CLINIC | Age: 33
End: 2023-04-10
Payer: COMMERCIAL

## 2023-04-10 RX ORDER — ACETAMINOPHEN 500 MG
1000 TABLET ORAL
Status: CANCELLED | OUTPATIENT
Start: 2023-04-11 | End: 2023-04-11

## 2023-04-10 NOTE — TELEPHONE ENCOUNTER
Pre-operative instructions given to pt. Instructed to have someone bring and park on second level in patient parking garage. Report to Day of Surgery for 5a, case start approx 7 am tomorrow 4/11/23. Taught to shower with Hibiclens tonight and morning of surgery. Instructed nothing to eat after midnight. Must have a ride after procedure. Went over post op instruction and appt. All questions addressed, Pt  verbalized understanding.

## 2023-04-10 NOTE — ANESTHESIA PREPROCEDURE EVALUATION
[facility]  Anesthesia Pre-Operative Evaluation         Patient Name: Maryan Grady  YOB: 1990  MRN: [id number]    SUBJECTIVE:     Pre-operative evaluation for Procedure(s) (LRB):  LOBECTOMY, THYROID (Right)  Scheduled for 4/11/2023    HPI 04/10/2023:  Maryan Grady is a 32 y.o. female with hx of GERD and obesity. Presented with thyroid nodule and s/p FNA. Presents now for above procedure.      Prev airway:   None on file    Oxygen/Ventilation Requirements:  On room air            Patient Active Problem List   Diagnosis    Acute pharyngitis    Neck pain    Multiple thyroid nodules       Review of patient's allergies indicates:   Allergen Reactions    Penicillins Rash       Outpatient Medications:  No current facility-administered medications on file prior to encounter.     Current Outpatient Medications on File Prior to Encounter   Medication Sig Dispense Refill    atovaquone-proguaniL (MALARONE) 250-100 mg Tab Take 1 tablet with food 1-2 days prior to exposure; take 1 tablet once a day during exposure; take 1 tablet once a day for 7 days after returning. (Patient not taking: Reported on 3/14/2023) 20 tablet 0    fluticasone propionate (FLONASE) 50 mcg/actuation nasal spray USE 1 SPRAY IN EACH NOSTRIL ONCE DAILY (Patient not taking: Reported on 8/22/2022) 48 g 1    ibuprofen (ADVIL,MOTRIN) 800 MG tablet Take 1 tablet (800 mg total) by mouth every 6 (six) hours as needed for Pain (throat and neck). 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 No       OBJECTIVE:     Weight:  Wt Readings from Last 1 Encounters:   03/23/23 81.2 kg (179 lb 0.2 oz)     There is no height or weight on file to calculate BMI.    Recent Blood Pressure Readings:  BP Readings from Last 3 Encounters:   03/23/23 108/66   03/14/23 116/62   02/15/23 115/62       Vital Signs Range (Last 24H):         CBC:   Lab Results   Component Value Date    WBC 4.31 03/23/2023    HGB 12.0 03/23/2023    HCT 37.6 03/23/2023    MCV 87 03/23/2023     03/23/2023       CMP:     Chemistry        Component Value Date/Time     03/23/2023 1010    K 4.6 03/23/2023 1010     03/23/2023 1010    CO2 26 03/23/2023 1010    BUN 14 03/23/2023 1010    CREATININE 0.9 03/23/2023 1010    GLU 90 03/23/2023 1010        Component Value Date/Time    CALCIUM 9.2 03/23/2023 1010    ALKPHOS 43 (L) 03/23/2023 1010    AST 14 03/23/2023 1010    ALT 11 03/23/2023 1010    BILITOT 0.4 03/23/2023 1010    ESTGFRAFRICA >60 08/06/2020 1528    EGFRNONAA >60 08/06/2020 1528            INR:  Lab Results   Component Value Date    INR 1.1 08/22/2022    INR 1.0 02/21/2022       Diagnostic Studies:      EKG:    Results for orders placed or performed in visit on 08/22/22   IN OFFICE EKG 12-LEAD (to [place])    Collection Time: 08/22/22 12:46 PM    Narrative    Test Reason : Z01.818    Vent. Rate : 070 BPM     Atrial Rate : 070 BPM     P-R Int : 196 ms          QRS Dur : 082 ms      QT Int : 400 ms       P-R-T Axes : 042 023 010 degrees     QTc Int : 432 ms    Normal sinus rhythm  Normal ECG  No previous ECGs available  Confirmed by David Ling MD ([id number]) on 8/26/2022 12:31:39 PM    Referred By: TIFFANIE HICKEY           Confirmed By:David Ling MD       2D Echo:    No results found for this or any  previous visit.    No results found for this or any previous visit.    No results found for this or any previous visit.      ASSESSMENT/PLAN:           Pre-op Assessment    I have reviewed the Patient Summary Reports.    I have reviewed the NPO Status.      Review of Systems  Social:  Non-Smoker    Cardiovascular:   Denies Hypertension.  Denies MI.  Denies CAD.       Pulmonary:   Denies COPD.  Denies Asthma.    Hepatic/GI:   GERD    Musculoskeletal:   Denies Arthritis.     Neurological:   Denies CVA.  Denies Dementia    Endocrine:   Denies Diabetes.  Obesity / BMI > 30  Psych:   Denies Psychiatric History.          Physical Exam  General: Well nourished, Cooperative, Alert and Oriented    Airway:  Mallampati: II   Mouth Opening: Normal  TM Distance: Normal  Tongue: Normal  Neck ROM: Normal ROM        Anesthesia Plan  Type of Anesthesia, risks & benefits discussed:    Anesthesia Type: Gen ETT  Intra-op Monitoring Plan: Standard ASA Monitors  Post Op Pain Control Plan: multimodal analgesia and IV/PO Opioids PRN  Induction:  IV  Airway Plan: Direct and Video, Post-Induction  Informed Consent: Informed consent signed with the Patient and all parties understand the risks and agree with anesthesia plan.  All questions answered.   ASA Score: 2  Day of Surgery Review of History & Physical: H&P Update referred to the surgeon/provider.    Ready For Surgery From Anesthesia Perspective.     .  I have seen the patient and reviewed the chart.  I agree with the resident's plan and evaluation.

## 2023-04-11 ENCOUNTER — ANESTHESIA (OUTPATIENT)
Dept: SURGERY | Facility: HOSPITAL | Age: 33
End: 2023-04-11
Payer: COMMERCIAL

## 2023-04-11 ENCOUNTER — PATIENT MESSAGE (OUTPATIENT)
Dept: SURGERY | Facility: HOSPITAL | Age: 33
End: 2023-04-11

## 2023-04-11 ENCOUNTER — HOSPITAL ENCOUNTER (OUTPATIENT)
Facility: HOSPITAL | Age: 33
Discharge: HOME | End: 2023-04-11
Attending: STUDENT IN AN ORGANIZED HEALTH CARE EDUCATION/TRAINING PROGRAM | Admitting: STUDENT IN AN ORGANIZED HEALTH CARE EDUCATION/TRAINING PROGRAM
Payer: COMMERCIAL

## 2023-04-11 VITALS
SYSTOLIC BLOOD PRESSURE: 120 MMHG | TEMPERATURE: 98 F | BODY MASS INDEX: 30.56 KG/M2 | RESPIRATION RATE: 17 BRPM | DIASTOLIC BLOOD PRESSURE: 74 MMHG | HEIGHT: 64 IN | HEART RATE: 83 BPM | WEIGHT: 179 LBS | OXYGEN SATURATION: 98 %

## 2023-04-11 DIAGNOSIS — E04.2 MULTIPLE THYROID NODULES: Primary | ICD-10-CM

## 2023-04-11 LAB
B-HCG UR QL: NEGATIVE
CTP QC/QA: YES

## 2023-04-11 PROCEDURE — 63600175 PHARM REV CODE 636 W HCPCS: Performed by: STUDENT IN AN ORGANIZED HEALTH CARE EDUCATION/TRAINING PROGRAM

## 2023-04-11 PROCEDURE — D9220A PRA ANESTHESIA: ICD-10-PCS | Mod: ,,, | Performed by: ANESTHESIOLOGY

## 2023-04-11 PROCEDURE — 71000015 HC POSTOP RECOV 1ST HR: Performed by: STUDENT IN AN ORGANIZED HEALTH CARE EDUCATION/TRAINING PROGRAM

## 2023-04-11 PROCEDURE — 27201423 OPTIME MED/SURG SUP & DEVICES STERILE SUPPLY: Performed by: STUDENT IN AN ORGANIZED HEALTH CARE EDUCATION/TRAINING PROGRAM

## 2023-04-11 PROCEDURE — 94799 UNLISTED PULMONARY SVC/PX: CPT

## 2023-04-11 PROCEDURE — 99900035 HC TECH TIME PER 15 MIN (STAT)

## 2023-04-11 PROCEDURE — 37000009 HC ANESTHESIA EA ADD 15 MINS: Performed by: STUDENT IN AN ORGANIZED HEALTH CARE EDUCATION/TRAINING PROGRAM

## 2023-04-11 PROCEDURE — 25000003 PHARM REV CODE 250: Performed by: STUDENT IN AN ORGANIZED HEALTH CARE EDUCATION/TRAINING PROGRAM

## 2023-04-11 PROCEDURE — 60220 PR THYROID LOBECTOMY,UNILAT: ICD-10-PCS | Mod: ,,, | Performed by: STUDENT IN AN ORGANIZED HEALTH CARE EDUCATION/TRAINING PROGRAM

## 2023-04-11 PROCEDURE — 36000707: Performed by: STUDENT IN AN ORGANIZED HEALTH CARE EDUCATION/TRAINING PROGRAM

## 2023-04-11 PROCEDURE — 63600175 PHARM REV CODE 636 W HCPCS

## 2023-04-11 PROCEDURE — D9220A PRA ANESTHESIA: Mod: ,,, | Performed by: ANESTHESIOLOGY

## 2023-04-11 PROCEDURE — 36000706: Performed by: STUDENT IN AN ORGANIZED HEALTH CARE EDUCATION/TRAINING PROGRAM

## 2023-04-11 PROCEDURE — 37000008 HC ANESTHESIA 1ST 15 MINUTES: Performed by: STUDENT IN AN ORGANIZED HEALTH CARE EDUCATION/TRAINING PROGRAM

## 2023-04-11 PROCEDURE — 81025 URINE PREGNANCY TEST: CPT | Performed by: STUDENT IN AN ORGANIZED HEALTH CARE EDUCATION/TRAINING PROGRAM

## 2023-04-11 PROCEDURE — 71000044 HC DOSC ROUTINE RECOVERY FIRST HOUR: Performed by: STUDENT IN AN ORGANIZED HEALTH CARE EDUCATION/TRAINING PROGRAM

## 2023-04-11 PROCEDURE — 71000045 HC DOSC ROUTINE RECOVERY EA ADD'L HR: Performed by: STUDENT IN AN ORGANIZED HEALTH CARE EDUCATION/TRAINING PROGRAM

## 2023-04-11 PROCEDURE — 94761 N-INVAS EAR/PLS OXIMETRY MLT: CPT

## 2023-04-11 PROCEDURE — 71000033 HC RECOVERY, INTIAL HOUR: Performed by: STUDENT IN AN ORGANIZED HEALTH CARE EDUCATION/TRAINING PROGRAM

## 2023-04-11 PROCEDURE — 60220 PARTIAL REMOVAL OF THYROID: CPT | Mod: ,,, | Performed by: STUDENT IN AN ORGANIZED HEALTH CARE EDUCATION/TRAINING PROGRAM

## 2023-04-11 PROCEDURE — 71000039 HC RECOVERY, EACH ADD'L HOUR: Performed by: STUDENT IN AN ORGANIZED HEALTH CARE EDUCATION/TRAINING PROGRAM

## 2023-04-11 RX ORDER — ACETAMINOPHEN 325 MG/1
650 TABLET ORAL EVERY 4 HOURS PRN
Status: DISCONTINUED | OUTPATIENT
Start: 2023-04-11 | End: 2023-04-11 | Stop reason: HOSPADM

## 2023-04-11 RX ORDER — OXYCODONE HYDROCHLORIDE 5 MG/1
5 TABLET ORAL EVERY 4 HOURS PRN
Qty: 10 TABLET | Refills: 0 | Status: SHIPPED | OUTPATIENT
Start: 2023-04-11 | End: 2023-04-27 | Stop reason: ALTCHOICE

## 2023-04-11 RX ORDER — NOREPINEPHRINE BITARTRATE 1 MG/ML
INJECTION, SOLUTION INTRAVENOUS
Status: DISCONTINUED | OUTPATIENT
Start: 2023-04-11 | End: 2023-04-11

## 2023-04-11 RX ORDER — PHENYLEPHRINE HCL IN 0.9% NACL 1 MG/10 ML
SYRINGE (ML) INTRAVENOUS
Status: DISCONTINUED | OUTPATIENT
Start: 2023-04-11 | End: 2023-04-11

## 2023-04-11 RX ORDER — ONDANSETRON 2 MG/ML
INJECTION INTRAMUSCULAR; INTRAVENOUS
Status: COMPLETED
Start: 2023-04-11 | End: 2023-04-11

## 2023-04-11 RX ORDER — DEXAMETHASONE SODIUM PHOSPHATE 4 MG/ML
INJECTION, SOLUTION INTRA-ARTICULAR; INTRALESIONAL; INTRAMUSCULAR; INTRAVENOUS; SOFT TISSUE
Status: DISCONTINUED | OUTPATIENT
Start: 2023-04-11 | End: 2023-04-11

## 2023-04-11 RX ORDER — SUCCINYLCHOLINE CHLORIDE 20 MG/ML
INJECTION INTRAMUSCULAR; INTRAVENOUS
Status: DISCONTINUED | OUTPATIENT
Start: 2023-04-11 | End: 2023-04-11

## 2023-04-11 RX ORDER — OXYCODONE HYDROCHLORIDE 5 MG/1
5 TABLET ORAL EVERY 4 HOURS PRN
Status: DISCONTINUED | OUTPATIENT
Start: 2023-04-11 | End: 2023-04-11 | Stop reason: HOSPADM

## 2023-04-11 RX ORDER — BUPIVACAINE HYDROCHLORIDE 2.5 MG/ML
INJECTION, SOLUTION EPIDURAL; INFILTRATION; INTRACAUDAL
Status: DISCONTINUED | OUTPATIENT
Start: 2023-04-11 | End: 2023-04-11 | Stop reason: HOSPADM

## 2023-04-11 RX ORDER — ONDANSETRON 8 MG/1
8 TABLET, ORALLY DISINTEGRATING ORAL EVERY 8 HOURS PRN
Status: DISCONTINUED | OUTPATIENT
Start: 2023-04-11 | End: 2023-04-11 | Stop reason: HOSPADM

## 2023-04-11 RX ORDER — LIDOCAINE HYDROCHLORIDE 10 MG/ML
1 INJECTION, SOLUTION EPIDURAL; INFILTRATION; INTRACAUDAL; PERINEURAL ONCE
Status: DISCONTINUED | OUTPATIENT
Start: 2023-04-11 | End: 2023-04-11 | Stop reason: HOSPADM

## 2023-04-11 RX ORDER — ONDANSETRON 2 MG/ML
4 INJECTION INTRAMUSCULAR; INTRAVENOUS EVERY 12 HOURS PRN
Status: DISCONTINUED | OUTPATIENT
Start: 2023-04-11 | End: 2023-04-11 | Stop reason: HOSPADM

## 2023-04-11 RX ORDER — MIDAZOLAM HYDROCHLORIDE 1 MG/ML
INJECTION, SOLUTION INTRAMUSCULAR; INTRAVENOUS
Status: DISCONTINUED | OUTPATIENT
Start: 2023-04-11 | End: 2023-04-11

## 2023-04-11 RX ORDER — SODIUM CHLORIDE 9 MG/ML
INJECTION, SOLUTION INTRAVENOUS CONTINUOUS
Status: DISCONTINUED | OUTPATIENT
Start: 2023-04-11 | End: 2023-04-11 | Stop reason: HOSPADM

## 2023-04-11 RX ORDER — PROPOFOL 10 MG/ML
VIAL (ML) INTRAVENOUS
Status: DISCONTINUED | OUTPATIENT
Start: 2023-04-11 | End: 2023-04-11

## 2023-04-11 RX ORDER — HYDROMORPHONE HYDROCHLORIDE 1 MG/ML
0.2 INJECTION, SOLUTION INTRAMUSCULAR; INTRAVENOUS; SUBCUTANEOUS EVERY 5 MIN PRN
Status: DISCONTINUED | OUTPATIENT
Start: 2023-04-11 | End: 2023-04-11 | Stop reason: HOSPADM

## 2023-04-11 RX ORDER — LIDOCAINE HYDROCHLORIDE 20 MG/ML
INJECTION, SOLUTION EPIDURAL; INFILTRATION; INTRACAUDAL; PERINEURAL
Status: DISCONTINUED | OUTPATIENT
Start: 2023-04-11 | End: 2023-04-11

## 2023-04-11 RX ORDER — HALOPERIDOL 5 MG/ML
0.5 INJECTION INTRAMUSCULAR EVERY 10 MIN PRN
Status: DISCONTINUED | OUTPATIENT
Start: 2023-04-11 | End: 2023-04-11 | Stop reason: HOSPADM

## 2023-04-11 RX ORDER — CHOLECALCIFEROL (VITAMIN D3) 25 MCG
1000 TABLET ORAL DAILY
COMMUNITY

## 2023-04-11 RX ORDER — KETAMINE HCL IN 0.9 % NACL 50 MG/5 ML
SYRINGE (ML) INTRAVENOUS
Status: DISCONTINUED | OUTPATIENT
Start: 2023-04-11 | End: 2023-04-11

## 2023-04-11 RX ORDER — FENTANYL CITRATE 50 UG/ML
INJECTION, SOLUTION INTRAMUSCULAR; INTRAVENOUS
Status: DISCONTINUED | OUTPATIENT
Start: 2023-04-11 | End: 2023-04-11

## 2023-04-11 RX ORDER — ONDANSETRON 2 MG/ML
INJECTION INTRAMUSCULAR; INTRAVENOUS
Status: DISCONTINUED | OUTPATIENT
Start: 2023-04-11 | End: 2023-04-11

## 2023-04-11 RX ORDER — DEXTROMETHORPHAN HYDROBROMIDE, GUAIFENESIN 5; 100 MG/5ML; MG/5ML
650 LIQUID ORAL EVERY 8 HOURS
Refills: 0 | COMMUNITY
Start: 2023-04-11 | End: 2023-06-02

## 2023-04-11 RX ADMIN — Medication 20 MG: at 07:04

## 2023-04-11 RX ADMIN — SODIUM CHLORIDE: 0.9 INJECTION, SOLUTION INTRAVENOUS at 07:04

## 2023-04-11 RX ADMIN — FENTANYL CITRATE 50 MCG: 50 INJECTION, SOLUTION INTRAMUSCULAR; INTRAVENOUS at 07:04

## 2023-04-11 RX ADMIN — ONDANSETRON 4 MG: 2 INJECTION INTRAMUSCULAR; INTRAVENOUS at 09:04

## 2023-04-11 RX ADMIN — ONDANSETRON 4 MG: 2 INJECTION INTRAMUSCULAR; INTRAVENOUS at 06:04

## 2023-04-11 RX ADMIN — Medication 10 MG: at 07:04

## 2023-04-11 RX ADMIN — OXYCODONE 5 MG: 5 TABLET ORAL at 11:04

## 2023-04-11 RX ADMIN — REMIFENTANIL HYDROCHLORIDE 0.04 MCG/KG/MIN: 1 INJECTION, POWDER, LYOPHILIZED, FOR SOLUTION INTRAVENOUS at 07:04

## 2023-04-11 RX ADMIN — PROPOFOL 50 MG: 10 INJECTION, EMULSION INTRAVENOUS at 07:04

## 2023-04-11 RX ADMIN — Medication 100 MCG: at 07:04

## 2023-04-11 RX ADMIN — SODIUM CHLORIDE, SODIUM GLUCONATE, SODIUM ACETATE, POTASSIUM CHLORIDE, MAGNESIUM CHLORIDE, SODIUM PHOSPHATE, DIBASIC, AND POTASSIUM PHOSPHATE: .53; .5; .37; .037; .03; .012; .00082 INJECTION, SOLUTION INTRAVENOUS at 07:04

## 2023-04-11 RX ADMIN — SUCCINYLCHOLINE CHLORIDE 120 MG: 20 INJECTION, SOLUTION INTRAMUSCULAR; INTRAVENOUS at 07:04

## 2023-04-11 RX ADMIN — DEXAMETHASONE SODIUM PHOSPHATE 4 MG: 4 INJECTION INTRA-ARTICULAR; INTRALESIONAL; INTRAMUSCULAR; INTRAVENOUS; SOFT TISSUE at 07:04

## 2023-04-11 RX ADMIN — Medication 10 MG: at 09:04

## 2023-04-11 RX ADMIN — NOREPINEPHRINE BITARTRATE 16 MCG: 1 INJECTION, SOLUTION, CONCENTRATE INTRAVENOUS at 08:04

## 2023-04-11 RX ADMIN — LIDOCAINE HYDROCHLORIDE 100 MG: 20 INJECTION, SOLUTION EPIDURAL; INFILTRATION; INTRACAUDAL; PERINEURAL at 07:04

## 2023-04-11 RX ADMIN — MIDAZOLAM HYDROCHLORIDE 2 MG: 2 INJECTION, SOLUTION INTRAMUSCULAR; INTRAVENOUS at 07:04

## 2023-04-11 RX ADMIN — NOREPINEPHRINE BITARTRATE 0.02 MCG/KG/MIN: 1 INJECTION, SOLUTION, CONCENTRATE INTRAVENOUS at 08:04

## 2023-04-11 RX ADMIN — PROPOFOL 150 MG: 10 INJECTION, EMULSION INTRAVENOUS at 07:04

## 2023-04-11 NOTE — PATIENT INSTRUCTIONS
Post-Operative Instructions: Thyroid Lobectomy    PAIN CONTROL AND MEDICATIONS  Chloraseptic lozenges or spray: Use as needed for sore throat  Please take Tylenol for mild pain.  You can take up to 1000mg every 6-8 hours.  Do not exceed 4000mg in 24 hours  You can take the narcotic pain (oxycodone) medication for more severe pain.    You may resume taking your normal medications, with the EXCEPTION of the following:  Please check with your surgeon and internist/cardiologist for specific instructions about when you should re-start:  Apixaban (Eliquis), Clopidogrel (Plavix), Dabigatran (Pradaxa), Dipyridamole (Aggrenox), Rivaroxaban (Xarelto), Warfarin (Coumadin), or any other type of blood thinner you may be taking.  Aspirin & anti-inflammatories (i.e. Goody's, Excedrin, ibuprofen, Advil, Aleve): May begin 72 hours after surgery.  Vitamins, minerals, and herbal supplements: Please wait 1 week after surgery to restart these medications    WOUND CARE  Please use your ice pack for 30 minutes on / 30 minutes off for at least the first 3 days.  You will be discharged from the hospital with your incision covered by skin glue that will remain on until your postoperative appointment.  It is normal to develop a lump under the incision, this is expected and is related to the healing process.  The lump will gradually go away with time.  Ok to shower when you return home after surgery  Please notify your physician if your incision is red, warm/hot, if you have an increase in swelling, any new drainage, or if you have a fever >101.5 F.  Please call 911 or proceed to the Emergency Room if you have difficulty breathing.    ACTIVITIES  You may resume normal activities, depending on your energy level.  Please refrain from driving for at least 3 days, or until you have complete mobility of your neck.    Do not drive if you are taking narcotic pain medication.  Please refrain from heavy lifting (10 lbs.) for 10 days.    If you have any  questions or concerns, please call the surgeon's office at [phone].      Future Appointments   Date Time Provider Department Center   4/27/2023 11:00 AM Zaira Valera MD [facility]

## 2023-04-11 NOTE — ANESTHESIA PROCEDURE NOTES
Intubation    Date/Time: 4/11/2023 7:10 AM  Performed by: Jackelin Izquierdo DO  Authorized by: Khang Cárdenas Jr., MD     Intubation:     Induction:  Intravenous    Intubated:  Postinduction    Mask Ventilation:  Easy mask    Attempts:  1    Attempted By:  Resident anesthesiologist    Method of Intubation:  Video laryngoscopy    Blade:  Smith 3    Laryngeal View Grade: Grade I - full view of cords      Difficult Airway Encountered?: No      Complications:  None    Airway Device:  EMG ETT (NIMS)    Airway Device Size:  7.0    Style/Cuff Inflation:  Cuffed    Tube secured:  19    Secured at:  The teeth    Placement Verified By:  Capnometry    Complicating Factors:  None    Findings Post-Intubation:  BS equal bilateral and atraumatic/condition of teeth unchanged

## 2023-04-11 NOTE — PLAN OF CARE
Discharge instructions given to patient and spouse. Tolerating po fluids. Vital signs remain stable.

## 2023-04-11 NOTE — TRANSFER OF CARE
"Anesthesia Transfer of Care Note    Patient: Maryan Grady    Procedure(s) Performed: Procedure(s) (LRB):  LOBECTOMY, THYROID (Right)    Patient location: PACU    Anesthesia Type: general    Transport from OR: Transported from OR on 6-10 L/min O2 by face mask with adequate spontaneous ventilation    Post pain: adequate analgesia    Post assessment: no apparent anesthetic complications and tolerated procedure well    Post vital signs: stable    Level of consciousness: awake and alert    Nausea/Vomiting: no nausea/vomiting    Complications: none    Transfer of care protocol was followed      Last vitals:   Visit Vitals  /76 (BP Location: Right arm, Patient Position: Lying)   Pulse 110   Temp 36.3 °C (97.3 °F) (Temporal)   Resp 14   Ht 5' 4" (1.626 m)   Wt 81.2 kg (179 lb 0.2 oz)   LMP 03/22/2023 (Approximate)   SpO2 100%   Breastfeeding No   BMI 30.73 kg/m²     "

## 2023-04-11 NOTE — ANESTHESIA POSTPROCEDURE EVALUATION
Anesthesia Post Evaluation    Patient: Maryan Grady    Procedure(s) Performed: Procedure(s) (LRB):  LOBECTOMY, THYROID (Right)    Final Anesthesia Type: general      Patient location during evaluation: PACU  Patient participation: Yes- Able to Participate  Level of consciousness: awake and alert and oriented  Post-procedure vital signs: reviewed and stable  Pain management: adequate  Airway patency: patent    PONV status at discharge: No PONV  Anesthetic complications: no      Cardiovascular status: stable  Respiratory status: unassisted, spontaneous ventilation and room air  Hydration status: euvolemic  Follow-up not needed.          Vitals Value Taken Time   /74 04/11/23 1146   Temp 36.3 °C (97.3 °F) 04/11/23 1018   Pulse 94 04/11/23 1156   Resp 12 04/11/23 1156   SpO2 100 % 04/11/23 1156   Vitals shown include unvalidated device data.      No case tracking events are documented in the log.      Pain/Yesenia Score: Pain Rating Prior to Med Admin: 4 (4/11/2023 11:07 AM)

## 2023-04-11 NOTE — BRIEF OP NOTE
[facility] - Surgery ([facility])  Brief Operative Note    Surgery Date: 4/11/2023     Surgeon(s) and Role:     * Zaira Valera MD - Primary     * Citlali Etienne MD - Resident - Assisting        Pre-op Diagnosis:  Multiple thyroid nodules [E04.2]    Post-op Diagnosis:  Post-Op Diagnosis Codes:     * Multiple thyroid nodules [E04.2]    Procedure(s) (LRB):  LOBECTOMY, THYROID (Right)    Anesthesia: General    Operative Findings:   Right thyroid nodule with no gross extension into surrounding structures  Parathyroid tissue preserved     Estimated Blood Loss: 10 ml         Specimens:   Specimen (24h ago, onward)       Start     Ordered    04/11/23 0930  Specimen to Pathology, Surgery General Surgery  Once        Comments: Pre-op Diagnosis: Multiple thyroid nodules [E04.2]Procedure(s):LOBECTOMY, THYROID Number of specimens: 1Name of specimens: 1. Right thyroid lobectomy and isthmus- short stitch marks superior pole, long stitch marks isthmus- permanent     References:    Click here for ordering Quick Tip   Question Answer Comment   Procedure Type: General Surgery    Specimen Class: Known or suspected malignancy    Which provider would you like to cc? TIFFANIE HICKEY    Release to patient Immediate        04/11/23 0942                      Discharge Note    OUTCOME: Patient tolerated treatment/procedure well without complication and is now ready for discharge.    DISPOSITION: Home or Self Care    FINAL DIAGNOSIS:  Multiple thyroid nodules    FOLLOWUP: In clinic    DISCHARGE INSTRUCTIONS:    Discharge Procedure Orders   Diet general     Call MD for:  temperature >100.4     Call MD for:  persistent nausea and vomiting     Call MD for:  severe uncontrolled pain     Call MD for:  redness, tenderness, or signs of infection (pain, swelling, redness, odor or green/yellow discharge around incision site)     No dressing needed   Order Comments: Activity as tolerated. Patient can shower, allow water to run over incisions. No soaking  in bath or pools for 2 weeks. Do not pick at surgical glue, it will come off on its own.     Activity as tolerated

## 2023-04-11 NOTE — OP NOTE
[facility]  Endocrine Surgery  Operative Report         Date of Procedure: 4/11/2023     Procedure: Procedure(s) (LRB):  LOBECTOMY, THYROID (Right)     Indications: This patient presents with a right 2.2 cm nodule with FLUS cytology and potential extrathyroidal extension on ultrasound.     Surgeon(s) and Role:     * Zaira Valera MD - Primary     * Citlali Etienne MD - Resident - Assisting (PGY-4)    Pre-Operative Diagnosis: Multiple thyroid nodules [E04.2]    Post-Operative Diagnosis: Multiple thyroid nodules [E04.2]    Anesthesia: General    Procedures:   Right thyroid lobectomy and isthmusectomy  Intraoperative monitoring and interpretation of bilateral cranial nerves (vagus and recurrent laryngeal nerves) using the [employer] system    Intraoperative Findings:   Dominant right thyroid nodule palpable, no gross invasion into surrounding structures.   The right recurrent laryngeal and vagus nerves were identified and preserved.  Function was verified using the nerve monitoring system.  Parathyroid tissue was identified and preserved with a viable blood supply.     Description of the Procedure:  The patient was seen in the Holding Room. The risks, benefits, complications, treatment options, and expected outcomes were discussed with the patient.  Possible complications associated with this may include, but may not be restricted to hoarseness, voice changes, recurrent laryngeal nerve or superior laryngeal nerve injury - temporary or permanent, neck hematoma, bleeding, infection, scarring, reaction to medication, pulmonary aspiration, perforation of viscus, the need for additional procedures, failure to diagnose a condition, creating a complication requiring transfusion or operation, death and imponderables.  Hypocalcemia would be a risk if the patient would need a total or completion thyroidectomy.  The patient understood that thyroid hormone replacement may be necessary even after a thyroid  lobectomy, and definitely after a total or completion thyroidectomy. Thyroid function will need to be monitored.  The patient concurred with the proposed plan and agreed to proceed despite the risks, giving informed consent.  The site of surgery properly noted/marked. The patient was taken to Operating Room, identified as Maryan Grady and the procedure verified as right thyroid lobectomy.    The patient was placed supine after induction of a general anesthetic.  Appropriate lines and access were confirmed by the anesthesia team.  The neck was supported in an extended position and the surgical field was prepped and draped in sterile fashion.  A comprehensive time out was performed.      A 4.5 cm transverse cervical incision was created below the cricoid cartilage within a natural skin fold.  Dissection was carried down through the platysma layer.  Once this was completed, sub-platysmal flaps were raised superiorly to the thyroid cartilage and inferiorly to the sternal notch. The strap muscles were identified and divided at the midline. Sharp and blunt dissection were used to mobilize the right thyroid lobe in a medial direction.  The right thyroid nodule was palpable, however there was no gross invasion into the surrounding structures and there were clear tissue planes between the thyroid capsule and the strap muscles.  The middle thyroid vein was ligated with a silk tie and divided with the Ligasure.  Dissection continued posterolaterally to expose the tracheoesophageal groove and right carotid artery.  Vagus nerve signal was confirmed with the nerve monitoring system.  The superior pole was carefully dissected free and the superior pole vessels were individually ligated with silk ties and divided with the Ligasure.  The right thyroid lobe was mobilized further, the inferior pole vessels were similarly ligated along the thyroid capsule with silk ties and divided with the Ligasure allowing for delivery of  the thyroid lobe. The right recurrent laryngeal nerve was identified and preserved.  Function was verified using the nerve monitoring system.  The ligament of Aldana was carefully dissected and divided taking care to preserve the recurrent laryngeal nerve.  The right superior parathyroid gland was clearly identified and preserved in situ on a viable vascular pedicle.  The thyroid and isthmus were dissected off the trachea using the Ligasure and bipolar cautery.  The isthmus was divided at the border of the contralateral thyroid lobe.  The specimen was submitted to pathology for permanent evaluation.  A suture was placed for orientation purposes, short stitch marks superior pole, long stitch marks isthmus margin.     The wound was irrigated and inspected carefully.  Multiple Valsalva maneuvers were performed at 30-35 cm of water and additional hemostasis was achieved as necessary with focal application of bipolar cautery.  This was augmented with Fibrillar which was placed in the thyroid fossa.  The superior parathyroid gland was inspected and confirmed to appear viable.  Function of the recurrent laryngeal and vagus nerves were again verified using the nerve monitoring system prior to closure.  The strap muscles were closed with interrupted 3-0 Vicryl suture.  The platysma was closed with interrupted 3-0 Vicryl suture, 0.25% Marcaine was injected into the subcutaneous tissue of the incision for post-op analgesia and the skin incision was closed with a 6-0 Vicryl subcuticular knot-less closure.  Sterile skin glue was applied to the incision.    Instrument, sponge and needle counts were reported correct prior to closure and at the conclusion of the case.  Procedures and specimens were confirmed with the circulating nurse at the completion of the case.  The family was updated at the completion of the case.    Complications: No    Estimated Blood Loss (EBL): less than 50 mL           Drains: None    Specimens:   Specimen  (24h ago, onward)       Start     Ordered    04/11/23 0930  Specimen to Pathology, Surgery General Surgery  Once        Comments: Pre-op Diagnosis: Multiple thyroid nodules [E04.2]Procedure(s):LOBECTOMY, THYROID Number of specimens: 1Name of specimens: 1. Right thyroid lobectomy and isthmus- short stitch marks superior pole, long stitch marks isthmus- permanent     References:    Click here for ordering Quick Tip   Question Answer Comment   Procedure Type: General Surgery    Specimen Class: Known or suspected malignancy    Which provider would you like to cc? TIFFANIE HICKEY    Release to patient Immediate        04/11/23 0942                            Condition: stable    Disposition: PACU - hemodynamically stable.    Attestation: I was present and scrubbed for the entire procedure.

## 2023-04-17 LAB
FINAL PATHOLOGIC DIAGNOSIS: NORMAL
Lab: NORMAL

## 2023-04-26 ENCOUNTER — PATIENT MESSAGE (OUTPATIENT)
Dept: SURGERY | Facility: CLINIC | Age: 33
End: 2023-04-26
Payer: COMMERCIAL

## 2023-04-27 ENCOUNTER — OFFICE VISIT (OUTPATIENT)
Dept: SURGERY | Facility: CLINIC | Age: 33
End: 2023-04-27
Payer: COMMERCIAL

## 2023-04-27 VITALS
HEART RATE: 83 BPM | HEIGHT: 64 IN | BODY MASS INDEX: 30.54 KG/M2 | SYSTOLIC BLOOD PRESSURE: 108 MMHG | WEIGHT: 178.88 LBS | DIASTOLIC BLOOD PRESSURE: 73 MMHG | OXYGEN SATURATION: 98 % | TEMPERATURE: 98 F

## 2023-04-27 DIAGNOSIS — E04.2 MULTIPLE THYROID NODULES: Primary | ICD-10-CM

## 2023-04-27 DIAGNOSIS — E55.9 VITAMIN D INSUFFICIENCY: ICD-10-CM

## 2023-04-27 PROCEDURE — 99024 POSTOP FOLLOW-UP VISIT: CPT | Mod: S$GLB,,, | Performed by: STUDENT IN AN ORGANIZED HEALTH CARE EDUCATION/TRAINING PROGRAM

## 2023-04-27 PROCEDURE — 1159F PR MEDICATION LIST DOCUMENTED IN MEDICAL RECORD: ICD-10-PCS | Mod: CPTII,S$GLB,, | Performed by: STUDENT IN AN ORGANIZED HEALTH CARE EDUCATION/TRAINING PROGRAM

## 2023-04-27 PROCEDURE — 99999 PR PBB SHADOW E&M-EST. PATIENT-LVL III: ICD-10-PCS | Mod: PBBFAC,,, | Performed by: STUDENT IN AN ORGANIZED HEALTH CARE EDUCATION/TRAINING PROGRAM

## 2023-04-27 PROCEDURE — 3074F SYST BP LT 130 MM HG: CPT | Mod: CPTII,S$GLB,, | Performed by: STUDENT IN AN ORGANIZED HEALTH CARE EDUCATION/TRAINING PROGRAM

## 2023-04-27 PROCEDURE — 3008F BODY MASS INDEX DOCD: CPT | Mod: CPTII,S$GLB,, | Performed by: STUDENT IN AN ORGANIZED HEALTH CARE EDUCATION/TRAINING PROGRAM

## 2023-04-27 PROCEDURE — 3008F PR BODY MASS INDEX (BMI) DOCUMENTED: ICD-10-PCS | Mod: CPTII,S$GLB,, | Performed by: STUDENT IN AN ORGANIZED HEALTH CARE EDUCATION/TRAINING PROGRAM

## 2023-04-27 PROCEDURE — 1159F MED LIST DOCD IN RCRD: CPT | Mod: CPTII,S$GLB,, | Performed by: STUDENT IN AN ORGANIZED HEALTH CARE EDUCATION/TRAINING PROGRAM

## 2023-04-27 PROCEDURE — 3074F PR MOST RECENT SYSTOLIC BLOOD PRESSURE < 130 MM HG: ICD-10-PCS | Mod: CPTII,S$GLB,, | Performed by: STUDENT IN AN ORGANIZED HEALTH CARE EDUCATION/TRAINING PROGRAM

## 2023-04-27 PROCEDURE — 99999 PR PBB SHADOW E&M-EST. PATIENT-LVL III: CPT | Mod: PBBFAC,,, | Performed by: STUDENT IN AN ORGANIZED HEALTH CARE EDUCATION/TRAINING PROGRAM

## 2023-04-27 PROCEDURE — 99024 PR POST-OP FOLLOW-UP VISIT: ICD-10-PCS | Mod: S$GLB,,, | Performed by: STUDENT IN AN ORGANIZED HEALTH CARE EDUCATION/TRAINING PROGRAM

## 2023-04-27 PROCEDURE — 3078F DIAST BP <80 MM HG: CPT | Mod: CPTII,S$GLB,, | Performed by: STUDENT IN AN ORGANIZED HEALTH CARE EDUCATION/TRAINING PROGRAM

## 2023-04-27 PROCEDURE — 3078F PR MOST RECENT DIASTOLIC BLOOD PRESSURE < 80 MM HG: ICD-10-PCS | Mod: CPTII,S$GLB,, | Performed by: STUDENT IN AN ORGANIZED HEALTH CARE EDUCATION/TRAINING PROGRAM

## 2023-04-27 NOTE — PROGRESS NOTES
"Postoperative Endocrine Surgery Clinic Note    Reason for visit / Chief complaint: Postoperative evaluation  Procedure:  Lobectomy, Thyroid - Right  Procedure Date: 4/11/2023      Subjective:     Maryan Grady returns today for postoperative evaluation, she is approximately 2 weeks post op.    Procedure:   Right thyroid lobectomy and isthmusectomy  Intraoperative monitoring and interpretation of bilateral cranial nerves (vagus and recurrent laryngeal nerves) using the [employer] system    Operative findings:   Dominant right thyroid nodule palpable, no gross invasion into surrounding structures.   The right recurrent laryngeal and vagus nerves were identified and preserved.  Function was verified using the nerve monitoring system.  Parathyroid tissue was identified and preserved with a viable blood supply.     She has had an uncomplicated postoperative course. She is not having any pain or dysphagia.     Current Outpatient Medications   Medication Sig Dispense Refill    vitamin D (VITAMIN D3) 1000 units Tab Take 1,000 Units by mouth once daily.      acetaminophen (TYLENOL) 650 MG TbSR Take 1 tablet (650 mg total) by mouth every 8 (eight) hours. (Patient not taking: Reported on 4/27/2023)  0    atovaquone-proguaniL (MALARONE) 250-100 mg Tab Take 1 tablet with food 1-2 days prior to exposure; take 1 tablet once a day during exposure; take 1 tablet once a day for 7 days after returning. (Patient not taking: Reported on 3/14/2023) 20 tablet 0    fluticasone propionate (FLONASE) 50 mcg/actuation nasal spray USE 1 SPRAY IN EACH NOSTRIL ONCE DAILY (Patient not taking: Reported on 8/22/2022) 48 g 1     No current facility-administered medications for this visit.     Review of patient's allergies indicates:   Allergen Reactions    Latex, natural rubber Rash    Penicillins Rash        Review of Systems  Negative except as per HPI.     Objective:   /73   Pulse 83   Temp 97.8 °F (36.6 °C)   Ht 5' 4" (1.626 " m)   Wt 81.1 kg (178 lb 14.5 oz)   LMP 04/11/2023   SpO2 98%   BMI 30.71 kg/m²     General: alert, well appearing, and in no distress  Neck: neck is flat, no erythema or edema  Incision: well approximated, healing well  Neurological: phonation is normal    Labs:  Lab Results   Component Value Date    CALCIUM 9.2 03/23/2023    ALBUMIN 4.0 03/23/2023    [id number] 15 (L) 03/23/2023       Pathology  Final Pathologic Diagnosis   Date Value Ref Range Status   04/11/2023   Final    [facility] DIAGNOSIS:    RIGHT THYROID AND ISTHMUS, LOBECTOMY:  -Benign thyroid parenchyma with follicular adenoma.  -Multiple levels examined.    JULIANO SEVERINO M.D.     Report attached.    Performing site:  [address] [address]    &quot;Disclaimer:  This case diagnosis was rendered completely by the outside consultation pathologist and the case is electronically signed by an [facility] pathologist listed below solely to release the report into the medical record.&quot;       Comment:     Interp By Caty Burnett M.D., Signed on 04/17/2023 at 15:15       Assessment and Plan:     Problem List Items Addressed This Visit          Endocrine    Multiple thyroid nodules - Primary    Current Assessment & Plan     S/p right thyroid lobectomy on 4/11/23. Uncomplicated post-operative course.     - Discontinue postoperative calcium supplementation  - Recommended taking daily calcium via dietary sources or supplementation equal to about 1200 mg daily and vitamin D3 supplementation,equal to 1000 - 2000 IU a day  - Incision care discussed, scar massage and routine scar care information provided  - Discussed pathology  - Discontinue narcotics  - Obtain thyroid function tests in approximately 4-6 weeks         Vitamin D insufficiency    Current Assessment & Plan     Vitamin D 15 in 3/2023.    - 2000 IU of over the counter vitamin D3 daily            Zaira Valera MD  Staff Surgeon  Endocrine Surgery  4/27/23

## 2023-04-27 NOTE — ASSESSMENT & PLAN NOTE
S/p right thyroid lobectomy on 4/11/23. Uncomplicated post-operative course.     - Discontinue postoperative calcium supplementation  - Recommended taking daily calcium via dietary sources or supplementation equal to about 1200 mg daily and vitamin D3 supplementation,equal to 1000 - 2000 IU a day  - Incision care discussed, scar massage and routine scar care information provided  - Discussed pathology  - Discontinue narcotics  - Obtain thyroid function tests in approximately 4-6 weeks

## 2023-06-02 ENCOUNTER — LAB VISIT (OUTPATIENT)
Dept: LAB | Facility: OTHER | Age: 33
End: 2023-06-02
Attending: OBSTETRICS & GYNECOLOGY
Payer: COMMERCIAL

## 2023-06-02 ENCOUNTER — OFFICE VISIT (OUTPATIENT)
Dept: OBSTETRICS AND GYNECOLOGY | Facility: CLINIC | Age: 33
End: 2023-06-02
Attending: OBSTETRICS & GYNECOLOGY
Payer: COMMERCIAL

## 2023-06-02 VITALS
HEIGHT: 64 IN | BODY MASS INDEX: 31.24 KG/M2 | DIASTOLIC BLOOD PRESSURE: 68 MMHG | SYSTOLIC BLOOD PRESSURE: 106 MMHG | WEIGHT: 183 LBS

## 2023-06-02 DIAGNOSIS — N89.8 VAGINAL IRRITATION: ICD-10-CM

## 2023-06-02 DIAGNOSIS — Z01.419 WOMEN'S ANNUAL ROUTINE GYNECOLOGICAL EXAMINATION: ICD-10-CM

## 2023-06-02 DIAGNOSIS — N89.8 VAGINAL DISCHARGE: ICD-10-CM

## 2023-06-02 DIAGNOSIS — N39.46 URINARY INCONTINENCE, MIXED: ICD-10-CM

## 2023-06-02 DIAGNOSIS — Z01.419 WOMEN'S ANNUAL ROUTINE GYNECOLOGICAL EXAMINATION: Primary | ICD-10-CM

## 2023-06-02 PROBLEM — J02.9 ACUTE PHARYNGITIS: Status: RESOLVED | Noted: 2022-05-09 | Resolved: 2023-06-02

## 2023-06-02 LAB
25(OH)D3+25(OH)D2 SERPL-MCNC: 32 NG/ML (ref 30–96)
ALBUMIN SERPL BCP-MCNC: 4 G/DL (ref 3.5–5.2)
ALP SERPL-CCNC: 37 U/L (ref 55–135)
ALT SERPL W/O P-5'-P-CCNC: 14 U/L (ref 10–44)
ANION GAP SERPL CALC-SCNC: 7 MMOL/L (ref 8–16)
AST SERPL-CCNC: 13 U/L (ref 10–40)
BASOPHILS # BLD AUTO: 0.04 K/UL (ref 0–0.2)
BASOPHILS NFR BLD: 0.8 % (ref 0–1.9)
BILIRUB SERPL-MCNC: 0.4 MG/DL (ref 0.1–1)
BUN SERPL-MCNC: 13 MG/DL (ref 6–20)
CALCIUM SERPL-MCNC: 9.3 MG/DL (ref 8.7–10.5)
CHLORIDE SERPL-SCNC: 105 MMOL/L (ref 95–110)
CO2 SERPL-SCNC: 24 MMOL/L (ref 23–29)
CREAT SERPL-MCNC: 0.8 MG/DL (ref 0.5–1.4)
DIFFERENTIAL METHOD: ABNORMAL
EOSINOPHIL # BLD AUTO: 0.1 K/UL (ref 0–0.5)
EOSINOPHIL NFR BLD: 2 % (ref 0–8)
ERYTHROCYTE [DISTWIDTH] IN BLOOD BY AUTOMATED COUNT: 13.7 % (ref 11.5–14.5)
EST. GFR  (NO RACE VARIABLE): >60 ML/MIN/1.73 M^2
ESTIMATED AVG GLUCOSE: 108 MG/DL (ref 68–131)
GLUCOSE SERPL-MCNC: 88 MG/DL (ref 70–110)
HBA1C MFR BLD: 5.4 % (ref 4–5.6)
HCT VFR BLD AUTO: 35.7 % (ref 37–48.5)
HGB BLD-MCNC: 11.7 G/DL (ref 12–16)
IMM GRANULOCYTES # BLD AUTO: 0.02 K/UL (ref 0–0.04)
IMM GRANULOCYTES NFR BLD AUTO: 0.4 % (ref 0–0.5)
LYMPHOCYTES # BLD AUTO: 1.7 K/UL (ref 1–4.8)
LYMPHOCYTES NFR BLD: 34 % (ref 18–48)
MCH RBC QN AUTO: 27.9 PG (ref 27–31)
MCHC RBC AUTO-ENTMCNC: 32.8 G/DL (ref 32–36)
MCV RBC AUTO: 85 FL (ref 82–98)
MONOCYTES # BLD AUTO: 0.2 K/UL (ref 0.3–1)
MONOCYTES NFR BLD: 4.1 % (ref 4–15)
NEUTROPHILS # BLD AUTO: 3 K/UL (ref 1.8–7.7)
NEUTROPHILS NFR BLD: 58.7 % (ref 38–73)
NRBC BLD-RTO: 0 /100 WBC
PLATELET # BLD AUTO: 284 K/UL (ref 150–450)
PMV BLD AUTO: 10.2 FL (ref 9.2–12.9)
POTASSIUM SERPL-SCNC: 4 MMOL/L (ref 3.5–5.1)
PROT SERPL-MCNC: 7.4 G/DL (ref 6–8.4)
RBC # BLD AUTO: 4.19 M/UL (ref 4–5.4)
SODIUM SERPL-SCNC: 136 MMOL/L (ref 136–145)
T4 FREE SERPL-MCNC: 1.09 NG/DL (ref 0.71–1.51)
TSH SERPL DL<=0.005 MIU/L-ACNC: 4.11 UIU/ML (ref 0.4–4)
WBC # BLD AUTO: 5.09 K/UL (ref 3.9–12.7)

## 2023-06-02 PROCEDURE — 87102 FUNGUS ISOLATION CULTURE: CPT | Performed by: OBSTETRICS & GYNECOLOGY

## 2023-06-02 PROCEDURE — 84439 ASSAY OF FREE THYROXINE: CPT | Performed by: OBSTETRICS & GYNECOLOGY

## 2023-06-02 PROCEDURE — 1159F PR MEDICATION LIST DOCUMENTED IN MEDICAL RECORD: ICD-10-PCS | Mod: CPTII,S$GLB,, | Performed by: OBSTETRICS & GYNECOLOGY

## 2023-06-02 PROCEDURE — 83036 HEMOGLOBIN GLYCOSYLATED A1C: CPT | Performed by: OBSTETRICS & GYNECOLOGY

## 2023-06-02 PROCEDURE — 99395 PR PREVENTIVE VISIT,EST,18-39: ICD-10-PCS | Mod: 25,S$GLB,, | Performed by: OBSTETRICS & GYNECOLOGY

## 2023-06-02 PROCEDURE — 81514 NFCT DS BV&VAGINITIS DNA ALG: CPT | Performed by: OBSTETRICS & GYNECOLOGY

## 2023-06-02 PROCEDURE — 82306 VITAMIN D 25 HYDROXY: CPT | Performed by: OBSTETRICS & GYNECOLOGY

## 2023-06-02 PROCEDURE — 80053 COMPREHEN METABOLIC PANEL: CPT | Performed by: OBSTETRICS & GYNECOLOGY

## 2023-06-02 PROCEDURE — 1159F MED LIST DOCD IN RCRD: CPT | Mod: CPTII,S$GLB,, | Performed by: OBSTETRICS & GYNECOLOGY

## 2023-06-02 PROCEDURE — 87591 N.GONORRHOEAE DNA AMP PROB: CPT | Performed by: OBSTETRICS & GYNECOLOGY

## 2023-06-02 PROCEDURE — 99395 PREV VISIT EST AGE 18-39: CPT | Mod: 25,S$GLB,, | Performed by: OBSTETRICS & GYNECOLOGY

## 2023-06-02 PROCEDURE — 3008F BODY MASS INDEX DOCD: CPT | Mod: CPTII,S$GLB,, | Performed by: OBSTETRICS & GYNECOLOGY

## 2023-06-02 PROCEDURE — 3074F PR MOST RECENT SYSTOLIC BLOOD PRESSURE < 130 MM HG: ICD-10-PCS | Mod: CPTII,S$GLB,, | Performed by: OBSTETRICS & GYNECOLOGY

## 2023-06-02 PROCEDURE — 84443 ASSAY THYROID STIM HORMONE: CPT | Performed by: OBSTETRICS & GYNECOLOGY

## 2023-06-02 PROCEDURE — 3078F DIAST BP <80 MM HG: CPT | Mod: CPTII,S$GLB,, | Performed by: OBSTETRICS & GYNECOLOGY

## 2023-06-02 PROCEDURE — 3008F PR BODY MASS INDEX (BMI) DOCUMENTED: ICD-10-PCS | Mod: CPTII,S$GLB,, | Performed by: OBSTETRICS & GYNECOLOGY

## 2023-06-02 PROCEDURE — 3078F PR MOST RECENT DIASTOLIC BLOOD PRESSURE < 80 MM HG: ICD-10-PCS | Mod: CPTII,S$GLB,, | Performed by: OBSTETRICS & GYNECOLOGY

## 2023-06-02 PROCEDURE — 3074F SYST BP LT 130 MM HG: CPT | Mod: CPTII,S$GLB,, | Performed by: OBSTETRICS & GYNECOLOGY

## 2023-06-02 PROCEDURE — 87106 FUNGI IDENTIFICATION YEAST: CPT | Performed by: OBSTETRICS & GYNECOLOGY

## 2023-06-02 PROCEDURE — 87086 URINE CULTURE/COLONY COUNT: CPT | Performed by: OBSTETRICS & GYNECOLOGY

## 2023-06-02 PROCEDURE — 99999 PR PBB SHADOW E&M-EST. PATIENT-LVL III: ICD-10-PCS | Mod: PBBFAC,,, | Performed by: OBSTETRICS & GYNECOLOGY

## 2023-06-02 PROCEDURE — 36415 COLL VENOUS BLD VENIPUNCTURE: CPT | Performed by: OBSTETRICS & GYNECOLOGY

## 2023-06-02 PROCEDURE — 99999 PR PBB SHADOW E&M-EST. PATIENT-LVL III: CPT | Mod: PBBFAC,,, | Performed by: OBSTETRICS & GYNECOLOGY

## 2023-06-02 PROCEDURE — 85025 COMPLETE CBC W/AUTO DIFF WBC: CPT | Performed by: OBSTETRICS & GYNECOLOGY

## 2023-06-02 RX ORDER — CLOBETASOL PROPIONATE 0.5 MG/G
OINTMENT TOPICAL 2 TIMES DAILY
Qty: 45 G | Refills: 1 | Status: SHIPPED | OUTPATIENT
Start: 2023-06-02 | End: 2023-12-27

## 2023-06-02 NOTE — PROGRESS NOTES
"  CC: Well woman exam    Maryan Grady is a 32 y.o. female  presents for well woman exam.  LMP: Patient's last menstrual period was 2023 (exact date)..  Concerns today:  Urinary leakage with holding for long periods of time  New yeast infections- bad reaction to monistat 7, had yeast/ BV  [place] is painful, dryness. Deep pelvic pain with intercourse as well.    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 No     Family History   Problem Relation Age of Onset    Hypothyroidism Mother     Hypertension Father     Melanoma Neg Hx     Breast cancer Neg Hx     Colon cancer Neg Hx     Ovarian cancer Neg Hx      OB History          3    Para   3    Term   3            AB        Living   3         SAB        IAB        Ectopic        Multiple   0    Live Births   3                 /68   Ht 5' 4" (1.626 m)   Wt 83 kg (182 lb 15.7 oz)   LMP 2023 (Exact Date)   BMI 31.41 kg/m²       ROS:  GENERAL: Denies weight gain or weight loss. Feeling well overall.   SKIN: Denies rash or lesions.   HEAD: Denies head injury or headache.   NODES: Denies enlarged lymph nodes.   CHEST: Denies " chest pain or shortness of breath.   CARDIOVASCULAR: Denies palpitations or left sided chest pain.   ABDOMEN: No abdominal pain, constipation, diarrhea, nausea, vomiting or rectal bleeding.   URINARY: No frequency, dysuria, hematuria, or burning on urination.  REPRODUCTIVE: See HPI.   BREASTS: The patient performs breast self-examination and denies pain, lumps, or nipple discharge.   HEMATOLOGIC: No easy bruisability or excessive bleeding.   MUSCULOSKELETAL: Denies joint pain or swelling.   NEUROLOGIC: Denies syncope or weakness.   PSYCHIATRIC: Denies depression, anxiety or mood swings.    PHYSICAL EXAM:  APPEARANCE: Well nourished, well developed, in no acute distress.  AFFECT: WNL, alert and oriented x 3  SKIN: No acne or hirsutism  NECK: Neck symmetric without masses or thyromegaly  NODES: No inguinal, cervical, axillary, or femoral lymph node enlargement  CHEST: Good respiratory effect  ABDOMEN: Soft.  No tenderness or masses.  No hepatosplenomegaly.  No hernias.  BREASTS: Symmetrical, no skin changes or visible lesions.  No palpable masses, nipple discharge bilaterally.  PELVIC: Normal external genitalia without lesions.  Normal hair distribution.  Adequate perineal body, normal urethral meatus.  Vagina moist and well rugated without lesions or discharge.  Cervix pink, without lesions, discharge or tenderness.  No significant cystocele or rectocele.  Bimanual exam shows uterus to be normal size, regular, mobile and nontender.  Adnexa without masses or tenderness.    EXTREMITIES: No edema.    Women's annual routine gynecological examination  -     CBC Auto Differential; Future; Expected date: 06/02/2023  -     Comprehensive Metabolic Panel; Future; Expected date: 06/02/2023  -     Hemoglobin A1C; Future; Expected date: 06/02/2023  -     TSH; Future; Expected date: 06/02/2023  -     T4, Free; Future; Expected date: 06/02/2023  -     Vitamin D; Future; Expected date: 06/02/2023    Urinary incontinence, mixed  -      Ambulatory referral/consult to Physical/Occupational Therapy; Future; Expected date: 06/09/2023  -     Urine culture    Vaginal irritation  -     CULTURE, FUNGUS  -     Vaginosis Screen by DNA Probe    Vaginal discharge  -     Vaginosis Screen by DNA Probe  -     C. trachomatis/N. gonorrhoeae by AMP DNA    Other orders  -     clobetasol 0.05% (TEMOVATE) 0.05 % Oint; Apply topically 2 (two) times daily.  Dispense: 45 g; Refill: 1            Patient was counseled today on A.C.S. Pap guidelines and recommendations for yearly pelvic exams, mammograms and monthly self breast exams; to see her PCP for other health maintenance.     No follow-ups on file.

## 2023-06-03 LAB — BACTERIA UR CULT: NORMAL

## 2023-06-05 LAB
BACTERIAL VAGINOSIS DNA: NEGATIVE
C TRACH DNA SPEC QL NAA+PROBE: NOT DETECTED
CANDIDA GLABRATA DNA: NEGATIVE
CANDIDA KRUSEI DNA: NEGATIVE
CANDIDA RRNA VAG QL PROBE: POSITIVE
N GONORRHOEA DNA SPEC QL NAA+PROBE: NOT DETECTED
T VAGINALIS RRNA GENITAL QL PROBE: NEGATIVE

## 2023-06-06 ENCOUNTER — PATIENT MESSAGE (OUTPATIENT)
Dept: OBSTETRICS AND GYNECOLOGY | Facility: CLINIC | Age: 33
End: 2023-06-06
Payer: COMMERCIAL

## 2023-06-07 ENCOUNTER — PATIENT MESSAGE (OUTPATIENT)
Dept: PRIMARY CARE CLINIC | Facility: CLINIC | Age: 33
End: 2023-06-07
Payer: COMMERCIAL

## 2023-06-07 ENCOUNTER — TELEPHONE (OUTPATIENT)
Dept: PRIMARY CARE CLINIC | Facility: CLINIC | Age: 33
End: 2023-06-07
Payer: COMMERCIAL

## 2023-06-07 ENCOUNTER — PATIENT MESSAGE (OUTPATIENT)
Dept: OBSTETRICS AND GYNECOLOGY | Facility: HOSPITAL | Age: 33
End: 2023-06-07
Payer: COMMERCIAL

## 2023-06-07 ENCOUNTER — PATIENT MESSAGE (OUTPATIENT)
Dept: OBSTETRICS AND GYNECOLOGY | Facility: CLINIC | Age: 33
End: 2023-06-07
Payer: COMMERCIAL

## 2023-06-07 RX ORDER — CLOTRIMAZOLE AND BETAMETHASONE DIPROPIONATE 10; .64 MG/G; MG/G
CREAM TOPICAL 2 TIMES DAILY
Qty: 15 G | Refills: 1 | Status: SHIPPED | OUTPATIENT
Start: 2023-06-07 | End: 2024-01-19

## 2023-06-07 RX ORDER — FLUCONAZOLE 200 MG/1
200 TABLET ORAL
Qty: 2 TABLET | Refills: 0 | Status: SHIPPED | OUTPATIENT
Start: 2023-06-07 | End: 2023-06-11

## 2023-06-07 NOTE — TELEPHONE ENCOUNTER
----- Message from Margy Mcintosh sent at 6/7/2023  2:51 PM CDT -----  Contact: [phone]  Pt is calling she is needing immunizations done please give return call

## 2023-07-05 DIAGNOSIS — R79.89 ABNORMAL TSH: Primary | ICD-10-CM

## 2023-07-06 LAB — FUNGUS SPEC CULT: ABNORMAL

## 2023-08-24 ENCOUNTER — PATIENT MESSAGE (OUTPATIENT)
Dept: OBSTETRICS AND GYNECOLOGY | Facility: CLINIC | Age: 33
End: 2023-08-24
Payer: COMMERCIAL

## 2023-08-24 DIAGNOSIS — N39.46 URINARY INCONTINENCE, MIXED: Primary | ICD-10-CM

## 2023-10-22 ENCOUNTER — PATIENT MESSAGE (OUTPATIENT)
Dept: OBSTETRICS AND GYNECOLOGY | Facility: CLINIC | Age: 33
End: 2023-10-22
Payer: COMMERCIAL

## 2023-11-29 ENCOUNTER — PATIENT MESSAGE (OUTPATIENT)
Dept: OBSTETRICS AND GYNECOLOGY | Facility: CLINIC | Age: 33
End: 2023-11-29
Payer: COMMERCIAL

## 2023-11-30 ENCOUNTER — PATIENT MESSAGE (OUTPATIENT)
Dept: OBSTETRICS AND GYNECOLOGY | Facility: CLINIC | Age: 33
End: 2023-11-30
Payer: COMMERCIAL

## 2023-11-30 DIAGNOSIS — M54.2 NECK PAIN: Primary | ICD-10-CM

## 2023-12-12 ENCOUNTER — TELEPHONE (OUTPATIENT)
Dept: INTERNAL MEDICINE | Facility: CLINIC | Age: 33
End: 2023-12-12
Payer: COMMERCIAL

## 2023-12-12 NOTE — TELEPHONE ENCOUNTER
----- Message from Margy Mcintosh sent at 12/12/2023  9:25 AM CST -----  Contact: [phone]  [email or web address]     Patient is calling for Medical Advice regarding:needing a clearance     How long has patient had these symptoms:    Pharmacy name and phone#:    Would like response via [facility]:  no     Comments:  Pt is calling we have made an appt for 12/14 and she is asking if there is a sooner appt please let her know she ios also needing a clearance for a surgery the surgery will be 01/03/2024 please advise

## 2023-12-13 ENCOUNTER — OFFICE VISIT (OUTPATIENT)
Dept: INTERNAL MEDICINE | Facility: CLINIC | Age: 33
End: 2023-12-13
Payer: COMMERCIAL

## 2023-12-13 VITALS
WEIGHT: 191.69 LBS | HEART RATE: 95 BPM | BODY MASS INDEX: 32.73 KG/M2 | OXYGEN SATURATION: 98 % | DIASTOLIC BLOOD PRESSURE: 72 MMHG | HEIGHT: 64 IN | SYSTOLIC BLOOD PRESSURE: 112 MMHG

## 2023-12-13 DIAGNOSIS — E04.2 MULTIPLE THYROID NODULES: ICD-10-CM

## 2023-12-13 DIAGNOSIS — E55.9 VITAMIN D INSUFFICIENCY: ICD-10-CM

## 2023-12-13 DIAGNOSIS — Z01.818 PREOPERATIVE EXAMINATION: Primary | ICD-10-CM

## 2023-12-13 PROCEDURE — 1159F MED LIST DOCD IN RCRD: CPT | Mod: CPTII,S$GLB,, | Performed by: INTERNAL MEDICINE

## 2023-12-13 PROCEDURE — 93005 EKG 12-LEAD: ICD-10-PCS | Mod: S$GLB,,, | Performed by: INTERNAL MEDICINE

## 2023-12-13 PROCEDURE — 99214 OFFICE O/P EST MOD 30 MIN: CPT | Mod: S$GLB,,, | Performed by: INTERNAL MEDICINE

## 2023-12-13 PROCEDURE — 3008F BODY MASS INDEX DOCD: CPT | Mod: CPTII,S$GLB,, | Performed by: INTERNAL MEDICINE

## 2023-12-13 PROCEDURE — 99999 PR PBB SHADOW E&M-EST. PATIENT-LVL IV: ICD-10-PCS | Mod: PBBFAC,,, | Performed by: INTERNAL MEDICINE

## 2023-12-13 PROCEDURE — 93010 ELECTROCARDIOGRAM REPORT: CPT | Mod: S$GLB,,, | Performed by: INTERNAL MEDICINE

## 2023-12-13 PROCEDURE — 3008F PR BODY MASS INDEX (BMI) DOCUMENTED: ICD-10-PCS | Mod: CPTII,S$GLB,, | Performed by: INTERNAL MEDICINE

## 2023-12-13 PROCEDURE — 3044F PR MOST RECENT HEMOGLOBIN A1C LEVEL <7.0%: ICD-10-PCS | Mod: CPTII,S$GLB,, | Performed by: INTERNAL MEDICINE

## 2023-12-13 PROCEDURE — 3078F PR MOST RECENT DIASTOLIC BLOOD PRESSURE < 80 MM HG: ICD-10-PCS | Mod: CPTII,S$GLB,, | Performed by: INTERNAL MEDICINE

## 2023-12-13 PROCEDURE — 1159F PR MEDICATION LIST DOCUMENTED IN MEDICAL RECORD: ICD-10-PCS | Mod: CPTII,S$GLB,, | Performed by: INTERNAL MEDICINE

## 2023-12-13 PROCEDURE — 93010 EKG 12-LEAD: ICD-10-PCS | Mod: S$GLB,,, | Performed by: INTERNAL MEDICINE

## 2023-12-13 PROCEDURE — 99214 PR OFFICE/OUTPT VISIT, EST, LEVL IV, 30-39 MIN: ICD-10-PCS | Mod: S$GLB,,, | Performed by: INTERNAL MEDICINE

## 2023-12-13 PROCEDURE — 3074F SYST BP LT 130 MM HG: CPT | Mod: CPTII,S$GLB,, | Performed by: INTERNAL MEDICINE

## 2023-12-13 PROCEDURE — 93005 ELECTROCARDIOGRAM TRACING: CPT | Mod: S$GLB,,, | Performed by: INTERNAL MEDICINE

## 2023-12-13 PROCEDURE — 99999 PR PBB SHADOW E&M-EST. PATIENT-LVL IV: CPT | Mod: PBBFAC,,, | Performed by: INTERNAL MEDICINE

## 2023-12-13 PROCEDURE — 3044F HG A1C LEVEL LT 7.0%: CPT | Mod: CPTII,S$GLB,, | Performed by: INTERNAL MEDICINE

## 2023-12-13 PROCEDURE — 3078F DIAST BP <80 MM HG: CPT | Mod: CPTII,S$GLB,, | Performed by: INTERNAL MEDICINE

## 2023-12-13 PROCEDURE — 3074F PR MOST RECENT SYSTOLIC BLOOD PRESSURE < 130 MM HG: ICD-10-PCS | Mod: CPTII,S$GLB,, | Performed by: INTERNAL MEDICINE

## 2023-12-13 NOTE — PATIENT INSTRUCTIONS
This is a reminder to be on time for your appointments. When I see patients, I want to be fair and give my time equally to all of my patients. We have 20 minutes together, and 10 minutes of that time is usually spent on rooming. Even small delays can make a huge difference in your visit so I advise you to check-in 10 minutes before your appointment time. This ensures that I can spend as much time with you as possible instead of needing to cut your visit short. Thank you for your consideration.

## 2023-12-13 NOTE — PROGRESS NOTES
Subjective:      Patient ID: Maryan Grady is a 33 y.o. female.    Chief Complaint: Preoperative examination    Maryan Grady is a 33 y.o. female with chronic conditions significant for multinodular thyroid s/p resection.   Presenting today for preoperative examination.    Preoperative examination:Here for preoperative examination. She is scheduled for liposuction on 1/3/2024 in [place].     Pt has no active cardiac condition (ACS/USA, decompenstated CHF, significant arrhythmias or severe valvular disease) and can easily achieve 4 METS.  Pt does not require any further workup prior to lipoduction. These recommendations follow the most current Guideline on Perioperative Cardiovascular Evaluation and Management of Patients Undergoing Noncardiac Surgery released by the ACC/AHA. (JACC 2014.07.944).    Multinodular thyroid nodule: S/p lobectomy on 4/11/2023. Recheck TSH.     Denies any chest pain, shortness of breath, nausea vomiting constipation diarrhea, blood in stool, heartburn    Review of Systems   Constitutional:  Negative for chills, fever and weight loss.   HENT:  Negative for congestion, ear pain and sore throat.    Eyes:  Negative for double vision.   Respiratory:  Negative for cough and shortness of breath.    Cardiovascular:  Negative for chest pain, palpitations and leg swelling.   Gastrointestinal:  Negative for abdominal pain, heartburn, nausea and vomiting.   Skin:  Negative for rash.   Neurological:  Negative for dizziness, tingling and headaches.   Psychiatric/Behavioral:  Negative for depression.          Current Outpatient Medications:     clobetasol 0.05% (TEMOVATE) 0.05 % Oint, Apply topically 2 (two) times daily., Disp: 45 g, Rfl: 1    clotrimazole-betamethasone 1-0.05% (LOTRISONE) cream, Apply topically 2 (two) times daily., Disp: 15 g, Rfl: 1    TENS units Chely, 1 each by Misc.(Non-Drug; Combo Route) route as needed (urinary incontinence)., Disp: 1 each, Rfl: 1    vitamin D  "(VITAMIN D3) 1000 units Tab, Take 1,000 Units by mouth once daily., Disp: , Rfl:     Lab Results   Component Value Date    HGBA1C 5.4 06/02/2023    HGBA1C 5.3 08/31/2022     No results found for: "MICALBCREAT"  No results found for: "LDLCALC", "CHOL", "HDL", "TRIG"    Lab Results   Component Value Date     06/02/2023    K 4.0 06/02/2023     06/02/2023    CO2 24 06/02/2023    GLU 88 06/02/2023    BUN 13 06/02/2023    CREATININE 0.8 06/02/2023    CALCIUM 9.3 06/02/2023    PROT 7.4 06/02/2023    ALBUMIN 4.0 06/02/2023    BILITOT 0.4 06/02/2023    ALKPHOS 37 (L) 06/02/2023    AST 13 06/02/2023    ALT 14 06/02/2023    ANIONGAP 7 (L) 06/02/2023    ESTGFRAFRICA >60 08/06/2020    EGFRNONAA >60 08/06/2020    WBC 5.09 06/02/2023    HGB 11.7 (L) 06/02/2023    HGB 12.0 03/23/2023    HCT 35.7 (L) 06/02/2023    MCV 85 06/02/2023     06/02/2023    TSH 4.106 (H) 06/02/2023       Lab Results   Component Value Date    YWOXJBAQ23RM 32 06/02/2023    EUFXCTHO30NM 15 (L) 03/23/2023         Past Medical History:   Diagnosis Date    GERD (gastroesophageal reflux disease)      Past Surgical History:   Procedure Laterality Date    EYE SURGERY      strabismus    THYROID LOBECTOMY Right 4/11/2023    Procedure: LOBECTOMY, THYROID;  Surgeon: Zaira Valera MD;  Location: [facility] OR [address] [place] [address];  Service: General;  Laterality: Right;     Social History     Social History Narrative    Not on file     Family History   Problem Relation Age of Onset    Hypothyroidism Mother     Hypertension Father     Melanoma Neg Hx     Breast cancer Neg Hx     Colon cancer Neg Hx     Ovarian cancer Neg Hx      Vitals:    12/13/23 1500   BP: 112/72   Pulse: 95   SpO2: 98%   Weight: 87 kg (191 lb 11 oz)   Height: 5' 4" (1.626 m)   PainSc: 0-No pain     Objective:   Physical Exam  Vitals reviewed.   Constitutional:       Appearance: Normal appearance.   HENT:      Head: Normocephalic.      Right Ear: External ear normal.      Left Ear: External ear " normal.      Nose: Nose normal.      Mouth/Throat:      Mouth: Mucous membranes are moist.      Pharynx: Oropharynx is clear.   Eyes:      Conjunctiva/sclera: Conjunctivae normal.      Pupils: Pupils are equal, round, and reactive to light.   Cardiovascular:      Rate and Rhythm: Normal rate and regular rhythm.      Pulses: Normal pulses.   Pulmonary:      Effort: Pulmonary effort is normal.      Breath sounds: Normal breath sounds.   Abdominal:      General: Abdomen is flat. Bowel sounds are normal.      Palpations: Abdomen is soft.   Musculoskeletal:      Cervical back: Neck supple.   Skin:     General: Skin is warm.   Neurological:      General: No focal deficit present.      Mental Status: She is alert.   Psychiatric:         Mood and Affect: Mood normal.       Assessment:     1. Preoperative examination    2. Multiple thyroid nodules    3. Vitamin D insufficiency      Plan:       Pt has no active cardiac condition (ACS/USA, decompenstated CHF, significant arrhythmias or severe valvular disease) and can easily achieve 4 METS.  Pt does not require any further workup prior to liposuction. These recommendations follow the most current Guideline on Perioperative Cardiovascular Evaluation and Management of Patients Undergoing Noncardiac Surgery released by the ACC/AHA. (JACC 2014.07.944).    Orders Placed This Encounter    CBC Auto Differential    T4, Free    TSH    Urinalysis, Reflex to Urine Culture Urine, Clean Catch    Hemoglobin A1C    Comprehensive Metabolic Panel    T3    SICKLE CELL SCREEN    PROTIME-INR    APTT    HIV 1/2 Ag/Ab (4th Gen)    HCG, QUANTITATIVE, PREGNANCY    IN OFFICE EKG 12-LEAD (to Muse)       Patient Instructions   This is a reminder to be on time for your appointments. When I see patients, I want to be fair and give my time equally to all of my patients. We have 20 minutes together, and 10 minutes of that time is usually spent on rooming. Even small delays can make a huge difference in your  visit so I advise you to check-in 10 minutes before your appointment time. This ensures that I can spend as much time with you as possible instead of needing to cut your visit short. Thank you for your consideration.   Tests to Keep You Healthy    Cervical Cancer Screening: DUE

## 2023-12-15 LAB
ALBUMIN SERPL-MCNC: 4.3 G/DL (ref 3.6–5.1)
ALBUMIN/GLOB SERPL: 1.5 (CALC) (ref 1–2.5)
ALP SERPL-CCNC: 32 U/L (ref 31–125)
ALT SERPL-CCNC: 24 U/L (ref 6–29)
APPEARANCE UR: CLEAR
APTT PPP: 29 SEC (ref 23–32)
AST SERPL-CCNC: 19 U/L (ref 10–30)
B-HCG SERPL-ACNC: <5 MIU/ML
BACTERIA #/AREA URNS HPF: NORMAL /HPF
BACTERIA UR CULT: NORMAL
BASOPHILS # BLD AUTO: 30 CELLS/UL (ref 0–200)
BASOPHILS NFR BLD AUTO: 0.8 %
BILIRUB SERPL-MCNC: 0.5 MG/DL (ref 0.2–1.2)
BILIRUB UR QL STRIP: NEGATIVE
BUN SERPL-MCNC: 10 MG/DL (ref 7–25)
BUN/CREAT SERPL: NORMAL (CALC) (ref 6–22)
CALCIUM SERPL-MCNC: 9.5 MG/DL (ref 8.6–10.2)
CHLORIDE SERPL-SCNC: 104 MMOL/L (ref 98–110)
CO2 SERPL-SCNC: 24 MMOL/L (ref 20–32)
COLOR UR: NORMAL
CREAT SERPL-MCNC: 0.79 MG/DL (ref 0.5–0.97)
EGFR: 101 ML/MIN/1.73M2
EOSINOPHIL # BLD AUTO: 70 CELLS/UL (ref 15–500)
EOSINOPHIL NFR BLD AUTO: 1.9 %
ERYTHROCYTE [DISTWIDTH] IN BLOOD BY AUTOMATED COUNT: 13.6 % (ref 11–15)
GLOBULIN SER CALC-MCNC: 2.8 G/DL (CALC) (ref 1.9–3.7)
GLUCOSE SERPL-MCNC: 96 MG/DL (ref 65–99)
GLUCOSE UR QL STRIP: NEGATIVE
HBA1C MFR BLD: 5.4 % OF TOTAL HGB
HCT VFR BLD AUTO: 33.9 % (ref 35–45)
HGB BLD-MCNC: 11.5 G/DL (ref 11.7–15.5)
HGB S BLD QL SOLY: NEGATIVE
HGB UR QL STRIP: NEGATIVE
HIV 1+2 AB+HIV1 P24 AG SERPL QL IA: NORMAL
HYALINE CASTS #/AREA URNS LPF: NORMAL /LPF
INR PPP: 1
KETONES UR QL STRIP: NEGATIVE
LEUKOCYTE ESTERASE UR QL STRIP: NEGATIVE
LYMPHOCYTES # BLD AUTO: 1199 CELLS/UL (ref 850–3900)
LYMPHOCYTES NFR BLD AUTO: 32.4 %
MCH RBC QN AUTO: 29 PG (ref 27–33)
MCHC RBC AUTO-ENTMCNC: 33.9 G/DL (ref 32–36)
MCV RBC AUTO: 85.4 FL (ref 80–100)
MONOCYTES # BLD AUTO: 266 CELLS/UL (ref 200–950)
MONOCYTES NFR BLD AUTO: 7.2 %
NEUTROPHILS # BLD AUTO: 2135 CELLS/UL (ref 1500–7800)
NEUTROPHILS NFR BLD AUTO: 57.7 %
NITRITE UR QL STRIP: NEGATIVE
PH UR STRIP: 6 [PH] (ref 5–8)
PLATELET # BLD AUTO: 305 THOUSAND/UL (ref 140–400)
PMV BLD REES-ECKER: 10.8 FL (ref 7.5–12.5)
POTASSIUM SERPL-SCNC: 4 MMOL/L (ref 3.5–5.3)
PROT SERPL-MCNC: 7.1 G/DL (ref 6.1–8.1)
PROT UR QL STRIP: NEGATIVE
PROTHROMBIN TIME: 10.3 SEC (ref 9–11.5)
RBC # BLD AUTO: 3.97 MILLION/UL (ref 3.8–5.1)
RBC #/AREA URNS HPF: NORMAL /HPF
SERVICE CMNT-IMP: NORMAL
SODIUM SERPL-SCNC: 137 MMOL/L (ref 135–146)
SP GR UR STRIP: 1.02 (ref 1–1.03)
SQUAMOUS #/AREA URNS HPF: NORMAL /HPF
T3 SERPL-MCNC: 103 NG/DL (ref 76–181)
T4 FREE SERPL-MCNC: 1.2 NG/DL (ref 0.8–1.8)
TSH SERPL-ACNC: 3.19 MIU/L
WBC # BLD AUTO: 3.7 THOUSAND/UL (ref 3.8–10.8)
WBC #/AREA URNS HPF: NORMAL /HPF

## 2023-12-18 NOTE — PROGRESS NOTES
Hello, your blood work came back all within normal range. I have completed your paperwork. My staff will give you a call so you can come pick it up when you are available.

## 2023-12-19 ENCOUNTER — TELEPHONE (OUTPATIENT)
Dept: INTERNAL MEDICINE | Facility: CLINIC | Age: 33
End: 2023-12-19
Payer: COMMERCIAL

## 2023-12-19 NOTE — TELEPHONE ENCOUNTER
----- Message from Tita Gonzalez MD sent at 12/18/2023  1:10 PM CST -----  Hello, your blood work came back all within normal range. I have completed your paperwork. My staff will give you a call so you can come pick it up when you are available.

## 2023-12-26 ENCOUNTER — TELEPHONE (OUTPATIENT)
Dept: OBSTETRICS AND GYNECOLOGY | Facility: CLINIC | Age: 33
End: 2023-12-26
Payer: COMMERCIAL

## 2023-12-26 NOTE — TELEPHONE ENCOUNTER
----- Message from Priscila Avalos sent at 12/26/2023  1:12 PM CST -----  Regarding: appointment  Name of Who is Calling: Maryan           What is the request in detail: Patient is requesting a call back to schedule an appointment in regards to her hormones.            Can the clinic reply by [facility]: Yes           What Number to Call Back if not in [facility]: [phone]

## 2023-12-27 ENCOUNTER — OFFICE VISIT (OUTPATIENT)
Dept: INTERNAL MEDICINE | Facility: CLINIC | Age: 33
End: 2023-12-27
Payer: COMMERCIAL

## 2023-12-27 VITALS
WEIGHT: 188.06 LBS | OXYGEN SATURATION: 100 % | HEART RATE: 81 BPM | SYSTOLIC BLOOD PRESSURE: 112 MMHG | BODY MASS INDEX: 32.11 KG/M2 | DIASTOLIC BLOOD PRESSURE: 78 MMHG | HEIGHT: 64 IN

## 2023-12-27 DIAGNOSIS — L30.9 DERMATITIS: ICD-10-CM

## 2023-12-27 DIAGNOSIS — E66.9 CLASS 1 OBESITY WITH BODY MASS INDEX (BMI) OF 32.0 TO 32.9 IN ADULT, UNSPECIFIED OBESITY TYPE, UNSPECIFIED WHETHER SERIOUS COMORBIDITY PRESENT: ICD-10-CM

## 2023-12-27 DIAGNOSIS — E55.9 VITAMIN D INSUFFICIENCY: ICD-10-CM

## 2023-12-27 DIAGNOSIS — D64.9 ANEMIA, UNSPECIFIED TYPE: ICD-10-CM

## 2023-12-27 DIAGNOSIS — E04.2 MULTIPLE THYROID NODULES: ICD-10-CM

## 2023-12-27 DIAGNOSIS — Z00.00 ROUTINE GENERAL MEDICAL EXAMINATION AT A HEALTH CARE FACILITY: Primary | ICD-10-CM

## 2023-12-27 PROBLEM — E66.811 CLASS 1 OBESITY WITH BODY MASS INDEX (BMI) OF 32.0 TO 32.9 IN ADULT: Status: ACTIVE | Noted: 2023-12-27

## 2023-12-27 PROCEDURE — 1160F RVW MEDS BY RX/DR IN RCRD: CPT | Mod: CPTII,S$GLB,, | Performed by: INTERNAL MEDICINE

## 2023-12-27 PROCEDURE — 3078F DIAST BP <80 MM HG: CPT | Mod: CPTII,S$GLB,, | Performed by: INTERNAL MEDICINE

## 2023-12-27 PROCEDURE — 1159F PR MEDICATION LIST DOCUMENTED IN MEDICAL RECORD: ICD-10-PCS | Mod: CPTII,S$GLB,, | Performed by: INTERNAL MEDICINE

## 2023-12-27 PROCEDURE — 1159F MED LIST DOCD IN RCRD: CPT | Mod: CPTII,S$GLB,, | Performed by: INTERNAL MEDICINE

## 2023-12-27 PROCEDURE — 99395 PREV VISIT EST AGE 18-39: CPT | Mod: S$GLB,,, | Performed by: INTERNAL MEDICINE

## 2023-12-27 PROCEDURE — 1160F PR REVIEW ALL MEDS BY PRESCRIBER/CLIN PHARMACIST DOCUMENTED: ICD-10-PCS | Mod: CPTII,S$GLB,, | Performed by: INTERNAL MEDICINE

## 2023-12-27 PROCEDURE — 99999 PR PBB SHADOW E&M-EST. PATIENT-LVL IV: ICD-10-PCS | Mod: PBBFAC,,, | Performed by: INTERNAL MEDICINE

## 2023-12-27 PROCEDURE — 3044F PR MOST RECENT HEMOGLOBIN A1C LEVEL <7.0%: ICD-10-PCS | Mod: CPTII,S$GLB,, | Performed by: INTERNAL MEDICINE

## 2023-12-27 PROCEDURE — 99395 PR PREVENTIVE VISIT,EST,18-39: ICD-10-PCS | Mod: S$GLB,,, | Performed by: INTERNAL MEDICINE

## 2023-12-27 PROCEDURE — 3074F SYST BP LT 130 MM HG: CPT | Mod: CPTII,S$GLB,, | Performed by: INTERNAL MEDICINE

## 2023-12-27 PROCEDURE — 3044F HG A1C LEVEL LT 7.0%: CPT | Mod: CPTII,S$GLB,, | Performed by: INTERNAL MEDICINE

## 2023-12-27 PROCEDURE — 3008F BODY MASS INDEX DOCD: CPT | Mod: CPTII,S$GLB,, | Performed by: INTERNAL MEDICINE

## 2023-12-27 PROCEDURE — 3078F PR MOST RECENT DIASTOLIC BLOOD PRESSURE < 80 MM HG: ICD-10-PCS | Mod: CPTII,S$GLB,, | Performed by: INTERNAL MEDICINE

## 2023-12-27 PROCEDURE — 3074F PR MOST RECENT SYSTOLIC BLOOD PRESSURE < 130 MM HG: ICD-10-PCS | Mod: CPTII,S$GLB,, | Performed by: INTERNAL MEDICINE

## 2023-12-27 PROCEDURE — 99999 PR PBB SHADOW E&M-EST. PATIENT-LVL IV: CPT | Mod: PBBFAC,,, | Performed by: INTERNAL MEDICINE

## 2023-12-27 PROCEDURE — 3008F PR BODY MASS INDEX (BMI) DOCUMENTED: ICD-10-PCS | Mod: CPTII,S$GLB,, | Performed by: INTERNAL MEDICINE

## 2023-12-27 RX ORDER — TRIAMCINOLONE ACETONIDE 1 MG/G
CREAM TOPICAL 2 TIMES DAILY
Qty: 80 G | Refills: 1 | Status: SHIPPED | OUTPATIENT
Start: 2023-12-27

## 2023-12-27 NOTE — PROGRESS NOTES
Subjective:      Patient ID: Maryan Grady is a 33 y.o. female.    Chief Complaint: Annual Exam      Maryan Grady is a 33 y.o. female with chronic conditions significant for multinodular thyroid nodule s/p lobectomy, hx of liposuction  Presenting today for annual.    She is scheduled for liposuction on 1/4/2024 in [place]. Preoperative examination completed at last visit. She has had hx of 1 prior liposuction, is undergoing a repeat. The surgeon would like a repeat of CBC, ordered today.    Multinodular thyroid nodule: S/p lobectomy on 4/11/2023. TSH, T3 and T4 obtained during preop was unremarkable.     Dermatitis: Has been having mild dermatitis of her shoulder. Notes itching, will send for steroid cream at this time.     Denies any chest pain, shortness of breath, nausea vomiting constipation diarrhea, blood in stool, heartburn    Review of Systems   Constitutional:  Negative for chills, fever and weight loss.   HENT:  Negative for congestion, ear pain and sore throat.    Eyes:  Negative for double vision.   Respiratory:  Negative for cough and shortness of breath.    Cardiovascular:  Negative for chest pain, palpitations and leg swelling.   Gastrointestinal:  Negative for abdominal pain, heartburn, nausea and vomiting.   Skin:  Negative for rash.   Neurological:  Negative for dizziness, tingling and headaches.   Psychiatric/Behavioral:  Negative for depression.           Current Outpatient Medications:     clotrimazole-betamethasone 1-0.05% (LOTRISONE) cream, Apply topically 2 (two) times daily. (Patient not taking: Reported on 12/27/2023), Disp: 15 g, Rfl: 1    TENS units Chely, 1 each by Misc.(Non-Drug; Combo Route) route as needed (urinary incontinence). (Patient not taking: Reported on 12/27/2023), Disp: 1 each, Rfl: 1    triamcinolone acetonide 0.1% (KENALOG) 0.1 % cream, Apply topically 2 (two) times daily., Disp: 80 g, Rfl: 1    vitamin D (VITAMIN D3) 1000 units Tab, Take 1,000 Units  "by mouth once daily., Disp: , Rfl:     Lab Results   Component Value Date    HGBA1C 5.4 12/14/2023    HGBA1C 5.4 06/02/2023    HGBA1C 5.3 08/31/2022     No results found for: "MICALBCREAT"  No results found for: "LDLCALC", "CHOL", "HDL", "TRIG"    Lab Results   Component Value Date     12/14/2023    K 4.0 12/14/2023     12/14/2023    CO2 24 12/14/2023    GLU 96 12/14/2023    BUN 10 12/14/2023    CREATININE 0.79 12/14/2023    CALCIUM 9.5 12/14/2023    PROT 7.1 12/14/2023    ALBUMIN 4.3 12/14/2023    BILITOT 0.5 12/14/2023    ALKPHOS 37 (L) 06/02/2023    AST 19 12/14/2023    ALT 24 12/14/2023    ANIONGAP 7 (L) 06/02/2023    ESTGFRAFRICA >60 08/06/2020    EGFRNONAA >60 08/06/2020    WBC 3.7 (L) 12/14/2023    HGB 11.5 (L) 12/14/2023    HGB 11.7 (L) 06/02/2023    HCT 33.9 (L) 12/14/2023    MCV 85.4 12/14/2023     12/14/2023    TSH 3.19 12/14/2023       Lab Results   Component Value Date    NMUANOLJ03IE 32 06/02/2023    JHHMKTAH85HN 15 (L) 03/23/2023         Past Medical History:   Diagnosis Date    GERD (gastroesophageal reflux disease)      Past Surgical History:   Procedure Laterality Date    EYE SURGERY      strabismus    THYROID LOBECTOMY Right 4/11/2023    Procedure: LOBECTOMY, THYROID;  Surgeon: Zaira Valera MD;  Location: [address];  Service: General;  Laterality: Right;     Social History     Social History Narrative    Not on file     Family History   Problem Relation Age of Onset    Hypothyroidism Mother     Hypertension Father     Melanoma Neg Hx     Breast cancer Neg Hx     Colon cancer Neg Hx     Ovarian cancer Neg Hx      Vitals:    12/27/23 1340   BP: 112/78   Pulse: 81   SpO2: 100%   Weight: 85.3 kg (188 lb 0.8 oz)   Height: 5' 4" (1.626 m)   PainSc: 0-No pain     Objective:   Physical Exam  Vitals reviewed.   Constitutional:       Appearance: Normal appearance.   HENT:      Head: Normocephalic.      Right Ear: Tympanic membrane, ear canal and external ear normal.      Left " Ear: Tympanic membrane, ear canal and external ear normal.      Nose: Nose normal.      Mouth/Throat:      Mouth: Mucous membranes are moist.      Pharynx: Oropharynx is clear.   Eyes:      Conjunctiva/sclera: Conjunctivae normal.      Pupils: Pupils are equal, round, and reactive to light.   Cardiovascular:      Rate and Rhythm: Normal rate and regular rhythm.      Pulses: Normal pulses.   Pulmonary:      Effort: Pulmonary effort is normal.      Breath sounds: Normal breath sounds.   Abdominal:      General: Abdomen is flat. Bowel sounds are normal.      Palpations: Abdomen is soft.   Musculoskeletal:      Cervical back: Neck supple.   Skin:     General: Skin is warm.   Neurological:      General: No focal deficit present.      Mental Status: She is alert.   Psychiatric:         Mood and Affect: Mood normal.       Assessment/Plan     Maryan Grady is a 33 y.o.female with:    Routine general medical examination at a health care facility  -     CBC Auto Differential; Future; Expected date: 12/27/2023  -     Lipid Panel; Future; Expected date: 12/27/2023  - Bloodwork that was obtained prior to this appointment was reviewed in detail with the patient during the office visit.     Anemia, unspecified type  -     CBC Auto Differential; Future; Expected date: 12/27/2023    Multiple thyroid nodules  - Bloodwork that was obtained prior to this appointment was reviewed in detail with the patient during the office visit.     Vitamin D insufficiency    Dermatitis  -     triamcinolone acetonide 0.1% (KENALOG) 0.1 % cream; Apply topically 2 (two) times daily.  Dispense: 80 g; Refill: 1    Class I Obesity  - Discussed healthy BMI, goal weight.  Recommend diet/exercise and health lifestyle choices.          Chronic conditions status updated as per HPI.  Other than changes above, cont current medications and maintain follow up with specialists.  Return to clinic in Follow up in about 1 year (around 12/27/2024).      Tita  LANIE Gonzalez MD  [facility]    Patient Instructions   Labs are fasting. Please do not eat or drink anything other than water for 8-10 hrs prior to your lab work.    Tests to Keep You Healthy    Cervical Cancer Screening: DUE

## 2023-12-27 NOTE — PATIENT INSTRUCTIONS
Labs are fasting. Please do not eat or drink anything other than water for 8-10 hrs prior to your lab work.

## 2024-01-19 ENCOUNTER — OFFICE VISIT (OUTPATIENT)
Dept: OBSTETRICS AND GYNECOLOGY | Facility: CLINIC | Age: 34
End: 2024-01-19
Payer: COMMERCIAL

## 2024-01-19 VITALS
BODY MASS INDEX: 32.45 KG/M2 | WEIGHT: 190.06 LBS | SYSTOLIC BLOOD PRESSURE: 114 MMHG | DIASTOLIC BLOOD PRESSURE: 78 MMHG | HEIGHT: 64 IN

## 2024-01-19 DIAGNOSIS — L70.9 ACNE, UNSPECIFIED ACNE TYPE: ICD-10-CM

## 2024-01-19 DIAGNOSIS — R53.83 FATIGUE, UNSPECIFIED TYPE: Primary | ICD-10-CM

## 2024-01-19 PROCEDURE — 3074F SYST BP LT 130 MM HG: CPT | Mod: CPTII,S$GLB,,

## 2024-01-19 PROCEDURE — 3078F DIAST BP <80 MM HG: CPT | Mod: CPTII,S$GLB,,

## 2024-01-19 PROCEDURE — 99999 PR PBB SHADOW E&M-EST. PATIENT-LVL III: CPT | Mod: PBBFAC,,,

## 2024-01-19 PROCEDURE — 1159F MED LIST DOCD IN RCRD: CPT | Mod: CPTII,S$GLB,,

## 2024-01-19 PROCEDURE — 3008F BODY MASS INDEX DOCD: CPT | Mod: CPTII,S$GLB,,

## 2024-01-19 PROCEDURE — 1160F RVW MEDS BY RX/DR IN RCRD: CPT | Mod: CPTII,S$GLB,,

## 2024-01-19 PROCEDURE — 99213 OFFICE O/P EST LOW 20 MIN: CPT | Mod: S$GLB,,,

## 2024-01-19 NOTE — PROGRESS NOTES
"History & Physical  Gynecology      SUBJECTIVE:     Chief Complaint:   Hormone check     History of Present Illness  Maryan Grady is a 33 y.o. female presents for "hormone check." Pt states that for the past year she has felt increased fatigue, acne and rashes to her body. Does have a hx of  thyroid surgery- recently had thyroid checked with normal results. Also had CBC ordered recently. She states she gets regular periods once monthly. Not on birth control. Just feels like something is"off." Also reports recent skin discoloration.     BP Readings from Last 2 Encounters:   24 114/78   23 112/78          Review of patient's allergies indicates:   Allergen Reactions    Mushroom Anaphylaxis    Latex, natural rubber Rash    Penicillins Rash       Past Medical History:   Diagnosis Date    GERD (gastroesophageal reflux disease)      Past Surgical History:   Procedure Laterality Date    EYE SURGERY      strabismus    THYROID LOBECTOMY Right 2023    Procedure: LOBECTOMY, THYROID;  Surgeon: Zaira Valera MD;  Location: [facility] OR [address] [place] [address];  Service: General;  Laterality: Right;     OB History          3    Para   3    Term   3            AB        Living   3         SAB        IAB        Ectopic        Multiple   0    Live Births   3               Family History   Problem Relation Age of Onset    Hypothyroidism Mother     Hypertension Father     Melanoma Neg Hx     Breast cancer Neg Hx     Colon cancer Neg Hx     Ovarian cancer Neg Hx      Social History     Tobacco Use    Smoking status: Never    Smokeless tobacco: Never   Substance Use Topics    Alcohol use: Not Currently     Comment: Only drink socially once a month if that    Drug use: No       Current Outpatient Medications   Medication Sig    triamcinolone acetonide 0.1% (KENALOG) 0.1 % cream Apply topically 2 (two) times daily.    clotrimazole-betamethasone 1-0.05% (LOTRISONE) cream Apply topically 2 (two) " times daily. (Patient not taking: Reported on 12/27/2023)    TENS units Chely 1 each by Misc.(Non-Drug; Combo Route) route as needed (urinary incontinence). (Patient not taking: Reported on 12/27/2023)    vitamin D (VITAMIN D3) 1000 units Tab Take 1,000 Units by mouth once daily.     No current facility-administered medications for this visit.     Review of Systems:  Review of Systems   Constitutional:  Positive for fatigue.   Genitourinary:  Negative for menstrual problem.   Integumentary:  Positive for rash and acne.      OBJECTIVE:     Physical Exam:  Physical Exam  Pulmonary:      Effort: Pulmonary effort is normal.   Neurological:      Mental Status: She is alert and oriented to person, place, and time.   Psychiatric:         Mood and Affect: Mood normal.         Behavior: Behavior normal.       ASSESSMENT:       ICD-10-CM ICD-9-CM    1. Fatigue, unspecified type  R53.83 780.79 Testosterone, Free      TESTOSTERONE      ESTRADIOL      FOLLICLE STIMULATING HORMONE      2. Acne, unspecified acne type  L70.9 706.1 Testosterone, Free      TESTOSTERONE      ESTRADIOL      FOLLICLE STIMULATING HORMONE      Ambulatory referral/consult to Dermatology          Plan:      - Labs ordered, see above  - Referral to derm for acne/skin discoloration   - Discussed possible follow up with endocrine for further hormone evaluation     I spent a total of 20 minutes on the day of the visit.This includes face to face time and non-face to face time preparing to see the patient (eg, review of tests), Obtaining and/or reviewing separately obtained history, Documenting clinical information in the electronic or other health record, Independently interpreting resultsand communicating results to the patient/family/caregiver, or Care coordination     VENTURA Howell

## 2024-01-24 ENCOUNTER — LAB VISIT (OUTPATIENT)
Dept: LAB | Facility: OTHER | Age: 34
End: 2024-01-24
Payer: COMMERCIAL

## 2024-01-24 DIAGNOSIS — L70.9 ACNE, UNSPECIFIED ACNE TYPE: ICD-10-CM

## 2024-01-24 DIAGNOSIS — R53.83 FATIGUE, UNSPECIFIED TYPE: ICD-10-CM

## 2024-01-24 DIAGNOSIS — Z00.00 ROUTINE GENERAL MEDICAL EXAMINATION AT A HEALTH CARE FACILITY: ICD-10-CM

## 2024-01-24 LAB
CHOLEST SERPL-MCNC: 187 MG/DL (ref 120–199)
CHOLEST/HDLC SERPL: 2.8 {RATIO} (ref 2–5)
ESTRADIOL SERPL-MCNC: 158 PG/ML
FSH SERPL-ACNC: 6.09 MIU/ML
HDLC SERPL-MCNC: 66 MG/DL (ref 40–75)
HDLC SERPL: 35.3 % (ref 20–50)
LDLC SERPL CALC-MCNC: 107.4 MG/DL (ref 63–159)
NONHDLC SERPL-MCNC: 121 MG/DL
TESTOST SERPL-MCNC: 37 NG/DL (ref 5–73)
TRIGL SERPL-MCNC: 68 MG/DL (ref 30–150)

## 2024-01-24 PROCEDURE — 83001 ASSAY OF GONADOTROPIN (FSH): CPT

## 2024-01-24 PROCEDURE — 84403 ASSAY OF TOTAL TESTOSTERONE: CPT

## 2024-01-24 PROCEDURE — 82670 ASSAY OF TOTAL ESTRADIOL: CPT

## 2024-01-24 PROCEDURE — 80061 LIPID PANEL: CPT | Performed by: INTERNAL MEDICINE

## 2024-01-24 PROCEDURE — 84402 ASSAY OF FREE TESTOSTERONE: CPT

## 2024-01-29 ENCOUNTER — PATIENT MESSAGE (OUTPATIENT)
Dept: OBSTETRICS AND GYNECOLOGY | Facility: CLINIC | Age: 34
End: 2024-01-29
Payer: COMMERCIAL

## 2024-01-29 LAB — TESTOST FREE SERPL-MCNC: 0.6 PG/ML

## 2024-01-31 NOTE — PROGRESS NOTES
Your Cholesterol is NORMAL. Continue to focus on low fat, high fiber foods and aerobic exericse (huffing & puffing) for at least 20 minutes most days of the week.

## 2024-02-06 ENCOUNTER — PATIENT MESSAGE (OUTPATIENT)
Dept: INTERNAL MEDICINE | Facility: CLINIC | Age: 34
End: 2024-02-06
Payer: COMMERCIAL

## 2024-02-06 DIAGNOSIS — L70.0 ACNE VULGARIS: Primary | ICD-10-CM

## 2024-02-07 RX ORDER — TRETINOIN 0.25 MG/G
CREAM TOPICAL NIGHTLY
Qty: 45 G | Refills: 0 | OUTPATIENT
Start: 2024-02-07

## 2024-02-16 ENCOUNTER — PATIENT MESSAGE (OUTPATIENT)
Dept: INTERNAL MEDICINE | Facility: CLINIC | Age: 34
End: 2024-02-16
Payer: COMMERCIAL

## 2024-08-28 ENCOUNTER — PATIENT OUTREACH (OUTPATIENT)
Dept: ADMINISTRATIVE | Facility: HOSPITAL | Age: 34
End: 2024-08-28
Payer: COMMERCIAL

## 2024-10-04 ENCOUNTER — CLINICAL SUPPORT (OUTPATIENT)
Dept: PEDIATRICS | Facility: CLINIC | Age: 34
End: 2024-10-04
Payer: COMMERCIAL

## 2024-10-04 DIAGNOSIS — Z23 NEED FOR VACCINATION: Primary | ICD-10-CM

## 2024-10-04 PROCEDURE — 99999 PR PBB SHADOW E&M-EST. PATIENT-LVL I: CPT | Mod: PBBFAC,,,

## 2024-10-04 PROCEDURE — 90471 IMMUNIZATION ADMIN: CPT | Mod: S$GLB,,, | Performed by: PEDIATRICS

## 2024-10-04 PROCEDURE — 90656 IIV3 VACC NO PRSV 0.5 ML IM: CPT | Mod: S$GLB,,, | Performed by: PEDIATRICS

## 2024-10-16 ENCOUNTER — TELEPHONE (OUTPATIENT)
Dept: INTERNAL MEDICINE | Facility: CLINIC | Age: 34
End: 2024-10-16
Payer: COMMERCIAL

## 2024-10-16 NOTE — TELEPHONE ENCOUNTER
----- Message from [employer] sent at 10/16/2024 10:26 AM CDT -----  Name of Who is calling :EDIN VIRAMONTES [[id number]]        What is the request in detail:  Pt has random bruises on body. She would like to be seen as soon as possible. Please assist       Can the clinic reply by [facility]:no             What [facility]: [phone]

## 2024-10-17 ENCOUNTER — OFFICE VISIT (OUTPATIENT)
Dept: INTERNAL MEDICINE | Facility: CLINIC | Age: 34
End: 2024-10-17
Payer: COMMERCIAL

## 2024-10-17 ENCOUNTER — LAB VISIT (OUTPATIENT)
Dept: LAB | Facility: OTHER | Age: 34
End: 2024-10-17
Attending: INTERNAL MEDICINE
Payer: COMMERCIAL

## 2024-10-17 VITALS
DIASTOLIC BLOOD PRESSURE: 72 MMHG | OXYGEN SATURATION: 99 % | HEIGHT: 64 IN | HEART RATE: 80 BPM | BODY MASS INDEX: 31.74 KG/M2 | WEIGHT: 185.94 LBS | SYSTOLIC BLOOD PRESSURE: 118 MMHG

## 2024-10-17 DIAGNOSIS — E04.2 MULTIPLE THYROID NODULES: ICD-10-CM

## 2024-10-17 DIAGNOSIS — E55.9 VITAMIN D INSUFFICIENCY: ICD-10-CM

## 2024-10-17 DIAGNOSIS — Z11.3 SCREENING EXAMINATION FOR STI: ICD-10-CM

## 2024-10-17 DIAGNOSIS — R23.3 EASY BRUISING: Primary | ICD-10-CM

## 2024-10-17 DIAGNOSIS — R23.3 EASY BRUISING: ICD-10-CM

## 2024-10-17 DIAGNOSIS — L30.9 DERMATITIS: ICD-10-CM

## 2024-10-17 LAB
ALBUMIN SERPL BCP-MCNC: 4.2 G/DL (ref 3.5–5.2)
ALP SERPL-CCNC: 39 U/L (ref 40–150)
ALT SERPL W/O P-5'-P-CCNC: 11 U/L (ref 10–44)
ANION GAP SERPL CALC-SCNC: 7 MMOL/L (ref 8–16)
AST SERPL-CCNC: 14 U/L (ref 10–40)
BASOPHILS # BLD AUTO: 0.04 K/UL (ref 0–0.2)
BASOPHILS NFR BLD: 0.9 % (ref 0–1.9)
BILIRUB SERPL-MCNC: 0.6 MG/DL (ref 0.1–1)
BUN SERPL-MCNC: 12 MG/DL (ref 6–20)
CALCIUM SERPL-MCNC: 9.4 MG/DL (ref 8.7–10.5)
CHLORIDE SERPL-SCNC: 104 MMOL/L (ref 95–110)
CHOLEST SERPL-MCNC: 190 MG/DL (ref 120–199)
CHOLEST/HDLC SERPL: 3 {RATIO} (ref 2–5)
CO2 SERPL-SCNC: 24 MMOL/L (ref 23–29)
CREAT SERPL-MCNC: 0.8 MG/DL (ref 0.5–1.4)
DIFFERENTIAL METHOD BLD: ABNORMAL
EOSINOPHIL # BLD AUTO: 0.1 K/UL (ref 0–0.5)
EOSINOPHIL NFR BLD: 2.2 % (ref 0–8)
ERYTHROCYTE [DISTWIDTH] IN BLOOD BY AUTOMATED COUNT: 13.9 % (ref 11.5–14.5)
EST. GFR  (NO RACE VARIABLE): >60 ML/MIN/1.73 M^2
FERRITIN SERPL-MCNC: 20 NG/ML (ref 20–300)
GLUCOSE SERPL-MCNC: 87 MG/DL (ref 70–110)
HCT VFR BLD AUTO: 36 % (ref 37–48.5)
HCV AB SERPL QL IA: NEGATIVE
HDLC SERPL-MCNC: 64 MG/DL (ref 40–75)
HDLC SERPL: 33.7 % (ref 20–50)
HGB BLD-MCNC: 11.7 G/DL (ref 12–16)
HIV 1+2 AB+HIV1 P24 AG SERPL QL IA: NEGATIVE
IMM GRANULOCYTES # BLD AUTO: 0.01 K/UL (ref 0–0.04)
IMM GRANULOCYTES NFR BLD AUTO: 0.2 % (ref 0–0.5)
IRON SERPL-MCNC: 100 UG/DL (ref 30–160)
LDLC SERPL CALC-MCNC: 111 MG/DL (ref 63–159)
LYMPHOCYTES # BLD AUTO: 2.3 K/UL (ref 1–4.8)
LYMPHOCYTES NFR BLD: 49.9 % (ref 18–48)
MCH RBC QN AUTO: 27.9 PG (ref 27–31)
MCHC RBC AUTO-ENTMCNC: 32.5 G/DL (ref 32–36)
MCV RBC AUTO: 86 FL (ref 82–98)
MONOCYTES # BLD AUTO: 0.2 K/UL (ref 0.3–1)
MONOCYTES NFR BLD: 4.4 % (ref 4–15)
NEUTROPHILS # BLD AUTO: 1.9 K/UL (ref 1.8–7.7)
NEUTROPHILS NFR BLD: 42.4 % (ref 38–73)
NONHDLC SERPL-MCNC: 126 MG/DL
NRBC BLD-RTO: 0 /100 WBC
PLATELET # BLD AUTO: 310 K/UL (ref 150–450)
PMV BLD AUTO: 10.1 FL (ref 9.2–12.9)
POTASSIUM SERPL-SCNC: 4 MMOL/L (ref 3.5–5.1)
PROT SERPL-MCNC: 7.5 G/DL (ref 6–8.4)
RBC # BLD AUTO: 4.19 M/UL (ref 4–5.4)
SATURATED IRON: 21 % (ref 20–50)
SODIUM SERPL-SCNC: 135 MMOL/L (ref 136–145)
T3FREE SERPL-MCNC: 2.8 PG/ML (ref 2.3–4.2)
T4 FREE SERPL-MCNC: 1.01 NG/DL (ref 0.71–1.51)
TOTAL IRON BINDING CAPACITY: 469 UG/DL (ref 250–450)
TRANSFERRIN SERPL-MCNC: 317 MG/DL (ref 200–375)
TREPONEMA PALLIDUM IGG+IGM AB [PRESENCE] IN SERUM OR PLASMA BY IMMUNOASSAY: NONREACTIVE
TRIGL SERPL-MCNC: 75 MG/DL (ref 30–150)
TSH SERPL DL<=0.005 MIU/L-ACNC: 3.18 UIU/ML (ref 0.4–4)
WBC # BLD AUTO: 4.55 K/UL (ref 3.9–12.7)

## 2024-10-17 PROCEDURE — 82728 ASSAY OF FERRITIN: CPT | Performed by: INTERNAL MEDICINE

## 2024-10-17 PROCEDURE — 84443 ASSAY THYROID STIM HORMONE: CPT | Performed by: INTERNAL MEDICINE

## 2024-10-17 PROCEDURE — 84439 ASSAY OF FREE THYROXINE: CPT | Performed by: INTERNAL MEDICINE

## 2024-10-17 PROCEDURE — 87389 HIV-1 AG W/HIV-1&-2 AB AG IA: CPT | Performed by: INTERNAL MEDICINE

## 2024-10-17 PROCEDURE — 83540 ASSAY OF IRON: CPT | Performed by: INTERNAL MEDICINE

## 2024-10-17 PROCEDURE — 86803 HEPATITIS C AB TEST: CPT | Performed by: INTERNAL MEDICINE

## 2024-10-17 PROCEDURE — 85025 COMPLETE CBC W/AUTO DIFF WBC: CPT | Performed by: INTERNAL MEDICINE

## 2024-10-17 PROCEDURE — 1159F MED LIST DOCD IN RCRD: CPT | Mod: CPTII,S$GLB,, | Performed by: INTERNAL MEDICINE

## 2024-10-17 PROCEDURE — 82607 VITAMIN B-12: CPT | Performed by: INTERNAL MEDICINE

## 2024-10-17 PROCEDURE — 82746 ASSAY OF FOLIC ACID SERUM: CPT | Performed by: INTERNAL MEDICINE

## 2024-10-17 PROCEDURE — 3078F DIAST BP <80 MM HG: CPT | Mod: CPTII,S$GLB,, | Performed by: INTERNAL MEDICINE

## 2024-10-17 PROCEDURE — 80061 LIPID PANEL: CPT | Performed by: INTERNAL MEDICINE

## 2024-10-17 PROCEDURE — 3074F SYST BP LT 130 MM HG: CPT | Mod: CPTII,S$GLB,, | Performed by: INTERNAL MEDICINE

## 2024-10-17 PROCEDURE — 36415 COLL VENOUS BLD VENIPUNCTURE: CPT | Performed by: INTERNAL MEDICINE

## 2024-10-17 PROCEDURE — 99215 OFFICE O/P EST HI 40 MIN: CPT | Mod: S$GLB,,, | Performed by: INTERNAL MEDICINE

## 2024-10-17 PROCEDURE — 99999 PR PBB SHADOW E&M-EST. PATIENT-LVL III: CPT | Mod: PBBFAC,,, | Performed by: INTERNAL MEDICINE

## 2024-10-17 PROCEDURE — 80053 COMPREHEN METABOLIC PANEL: CPT | Performed by: INTERNAL MEDICINE

## 2024-10-17 PROCEDURE — 82306 VITAMIN D 25 HYDROXY: CPT | Performed by: INTERNAL MEDICINE

## 2024-10-17 PROCEDURE — 86593 SYPHILIS TEST NON-TREP QUANT: CPT | Performed by: INTERNAL MEDICINE

## 2024-10-17 PROCEDURE — 1160F RVW MEDS BY RX/DR IN RCRD: CPT | Mod: CPTII,S$GLB,, | Performed by: INTERNAL MEDICINE

## 2024-10-17 PROCEDURE — 3008F BODY MASS INDEX DOCD: CPT | Mod: CPTII,S$GLB,, | Performed by: INTERNAL MEDICINE

## 2024-10-17 PROCEDURE — 84481 FREE ASSAY (FT-3): CPT | Performed by: INTERNAL MEDICINE

## 2024-10-17 RX ORDER — TRIAMCINOLONE ACETONIDE 1 MG/G
CREAM TOPICAL 2 TIMES DAILY
Qty: 90 G | Refills: 1 | Status: SHIPPED | OUTPATIENT
Start: 2024-10-17

## 2024-10-17 NOTE — PROGRESS NOTES
"Subjective:      Patient ID: Maryan Grady is a 33 y.o. female.    Chief Complaint: Annual Exam and Bleeding/Bruising      Maryan Grady is a 33 y.o. female with chronic conditions significant for multinodular thyroid nodule s/p lobectomy, hx of liposuction   Presenting today for follow up / annual. Date of last annual is 12/27/2023     The patient consents verbally to being recorded for [employer] service today.     History of Present Illness    CHIEF COMPLAINT:  Ms. Grady presents today for follow up.    Multinodular thyroid nodule: S/p lobectomy on 4/11/2023. TSH, T3 and T4 ordered.    BRUISING AND BLEEDING:  She reports large bruises appearing on her legs, including a recent "huge" bruise. She denies bruising on her belly or other areas. She has a history of bruising issues, for which she was given vitamins, but indicates this current bruising is different. No hx of bleeding into joints.    SKIN CONCERNS:  She reports patchy, itchy rashes on her abdomen, initially thought to be ringworm-like. The rashes have been responding positively to steroid cream application. She denies the rashes forming complete circles. Given the response to steroid treatment and the presentation, there is a possibility of eczema or a similar skin condition.    COGNITIVE AND VOICE ISSUES:  She reports forgetting things more frequently than usual and an increase in stuttering. She wonders if these symptoms could be related to thyroid issues. She has been losing her voice and sometimes has a sore throat, with no associated weight changes.    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    MEDICAL HISTORY:  She has a history of thyroid surgery, with her right thyroid removed and left intact. She denies requiring any medication following the surgery and states that subsequent thyroid level " checks confirmed no need for thyroid medication.    MEDICATIONS AND SUPPLEMENTS:  She is using clobetasol cream for skin rashes. She recalls being prescribed ketoconazole cream in the past. She is currently taking a liquid iron supplement due to past concerns about her iron levels.    DIET AND LIFESTYLE:  She is attempting to increase meat consumption and incorporating beans, chickpeas, and spinach to boost iron intake. She acknowledges a possible deficiency in vegetable consumption.    PREVENTIVE CARE:  She received the flu vaccine in September along with her children during a family appointment.    FAMILY PLANNING AND SEXUAL HEALTH:  She has three children and emphatically states no desire for additional children. She requests STD testing for routine screening purposes, denying any new lesions or concerning symptoms.      ROS:  General: -fever, -chills, -fatigue, -weight gain, -weight loss, -change in weight  Eyes: -vision changes, -redness, -discharge  ENT: -ear pain, -nasal congestion, +sore throat, +bleeding gums  Cardiovascular: -chest pain, -palpitations, -lower extremity edema  Respiratory: -cough, -shortness of breath  Gastrointestinal: -abdominal pain, -nausea, -vomiting, -diarrhea, -constipation, -blood in stool  Genitourinary: -dysuria, -hematuria, -frequency  Musculoskeletal: -joint pain, -muscle pain  Skin: -rash, -lesion  Neurological: -headache, -dizziness, -numbness, -tingling  Psychiatric: -anxiety, -depression, -sleep difficulty            Current Outpatient Medications:     TENS units Chely, 1 each by Misc.(Non-Drug; Combo Route) route as needed (urinary incontinence)., Disp: 1 each, Rfl: 1    triamcinolone acetonide 0.1% (KENALOG) 0.1 % cream, Apply topically 2 (two) times daily., Disp: 90 g, Rfl: 1    vitamin D (VITAMIN D3) 1000 units Tab, Take 1,000 Units by mouth once daily., Disp: , Rfl:     Lab Results   Component Value Date    HGBA1C 5.4 12/14/2023    HGBA1C 5.4 06/02/2023    HGBA1C 5.3  "08/31/2022     No results found for: "MICALBCREAT"  Lab Results   Component Value Date    LDLCALC 107.4 01/24/2024    CHOL 187 01/24/2024    HDL 66 01/24/2024    TRIG 68 01/24/2024       Lab Results   Component Value Date     12/14/2023    K 4.0 12/14/2023     12/14/2023    CO2 24 12/14/2023    GLU 96 12/14/2023    BUN 10 12/14/2023    CREATININE 0.79 12/14/2023    CALCIUM 9.5 12/14/2023    PROT 7.1 12/14/2023    ALBUMIN 4.3 12/14/2023    BILITOT 0.5 12/14/2023    ALKPHOS 37 (L) 06/02/2023    AST 19 12/14/2023    ALT 24 12/14/2023    ANIONGAP 7 (L) 06/02/2023    ESTGFRAFRICA >60 08/06/2020    EGFRNONAA >60 08/06/2020    WBC 3.7 (L) 12/14/2023    HGB 11.5 (L) 12/14/2023    HGB 11.7 (L) 06/02/2023    HCT 33.9 (L) 12/14/2023    MCV 85.4 12/14/2023     12/14/2023    TSH 3.19 12/14/2023       Lab Results   Component Value Date    FSH 6.09 01/24/2024    TOTALTESTOST 37 01/24/2024    ESTRADIOL 158 01/24/2024    ESLFGZSZ64BZ 32 06/02/2023    QZVDSXIL30AK 15 (L) 03/23/2023         Past Medical History:   Diagnosis Date    GERD (gastroesophageal reflux disease)      Past Surgical History:   Procedure Laterality Date    EYE SURGERY      strabismus    THYROID LOBECTOMY Right 4/11/2023    Procedure: LOBECTOMY, THYROID;  Surgeon: Zaira Valera MD;  Location: [address];  Service: General;  Laterality: Right;     Social History     Social History Narrative    Not on file     Family History   Problem Relation Name Age of Onset    Hypothyroidism Mother      Hypertension Father Asael  totchum     Melanoma Neg Hx      Breast cancer Neg Hx      Colon cancer Neg Hx      Ovarian cancer Neg Hx       Vitals:    10/17/24 1335   BP: 118/72   Pulse: 80   SpO2: 99%   Weight: 84.4 kg (185 lb 15.3 oz)   Height: 5' 4" (1.626 m)   PainSc: 0-No pain     Objective:   Physical Exam    General: No acute distress. Well-developed. Well-nourished.  Eyes: EOMI. Sclerae anicteric.  HENT: Normocephalic. Atraumatic. Nares patent. " Moist oral mucosa.  Ears: Bilateral TMs clear. Bilateral EACs clear.  Cardiovascular: Regular rate. Regular rhythm. No murmurs. No rubs. No gallops. Normal S1, S2.  Respiratory: Normal respiratory effort. Clear to auscultation bilaterally. No rales. No rhonchi. No wheezing.  Abdomen: Soft. Non-tender. Non-distended. Normoactive bowel sounds.  Musculoskeletal: No  obvious deformity.  Extremities: No lower extremity edema.  Neurological: Alert & oriented x3. No slurred speech. Normal gait.  Psychiatric: Normal mood. Normal affect. Good insight. Good judgment.  Skin: Warm. Dry. No rash.        Assessment/Plan     Assessment & Plan    - Assessed reported bruising and rash symptoms  - Considered vitamin deficiencies (iron, B12, folate) as potential causes for easy bruising rather than inherited blood disorders  - Evaluated rash presentation and response to steroid treatment, concluding it is likely eczema rather than ringworm  - Determined thyroid function should be rechecked due to patient's history of partial thyroidectomy and current symptoms  - Will screen for STIs as a precautionary measure  - Noted patient is due for Pap smear with OB-GYN    ECZEMA:  - Explained the difference between eczema and ringworm, noting that steroid response suggests eczema.  - Continued triamcinolone cream for rash treatment.  - Ms. Grady to take pictures of rash when it flares up for future evaluation.    EASY BRUISING:  - Discussed potential causes of easy bruising, focusing on vitamin deficiencies rather than blood disorders.  - Ms. Grady to monitor for any instances of running into objects that could cause bruising.    IRON DEFICIENCY:  - Educated on iron-rich foods as alternatives to supplements, including spinach, chickpeas, and beans.  - Recommend incorporating more iron-rich foods into diet, such as spinach, chickpeas, and beans.       Easy bruising  -     CBC Auto Differential; Future; Expected date: 10/17/2024  -      Comprehensive Metabolic Panel; Future; Expected date: 10/17/2024  -     Lipid Panel; Future; Expected date: 10/17/2024  -     Ferritin; Future; Expected date: 10/17/2024  -     Iron and TIBC; Future; Expected date: 10/17/2024  -     Vitamin B12; Future; Expected date: 10/17/2024  -     Vitamin D; Future; Expected date: 10/17/2024  -     FOLATE; Future; Expected date: 10/17/2024    Dermatitis  -     triamcinolone acetonide 0.1% (KENALOG) 0.1 % cream; Apply topically 2 (two) times daily.  Dispense: 90 g; Refill: 1    Multiple thyroid nodules  -     TSH; Future; Expected date: 10/17/2024  -     T4, Free; Future; Expected date: 10/17/2024  -     T3, Free; Future; Expected date: 10/17/2024    Vitamin D insufficiency  -     Vitamin D; Future; Expected date: 10/17/2024    Screening examination for STI  -     Hepatitis C Antibody; Future; Expected date: 10/17/2024  -     HIV 1/2 Ag/Ab (4th Gen); Future; Expected date: 10/17/2024  -     C. trachomatis/N. gonorrhoeae by AMP DNA; Future; Expected date: 10/17/2024  -     Treponema Pallidium Antibodies IgG, IgM; Future; Expected date: 10/17/2024        Chronic conditions status updated as per HPI.  Other than changes above, cont current medications and maintain follow up with specialists.  Return to clinic in Follow up in about 1 year (around 10/17/2025).      Tita Gonzalez MD  [facility]    This note was generated with the assistance of ambient listening technology. Verbal consent was obtained by the patient and accompanying visitor(s) for the recording of patient appointment to facilitate this note. I attest to having reviewed and edited the generated note for accuracy, though some syntax or spelling errors may persist. Please contact the author of this note for any clarification.     Total time spent for this encounter: 41 min. This includes face to face time and non-face to face time preparing to see the patient (eg, review of tests), obtaining and/or reviewing  separately obtained history, documenting clinical information in the electronic or other health record, independently interpreting results and communicating results to the patient/family/caregiver, or care coordinator.      There are no Patient Instructions on file for this visit.  Tests to Keep You Healthy    Cervical Cancer Screening: DUE

## 2024-10-18 LAB
25(OH)D3+25(OH)D2 SERPL-MCNC: 21 NG/ML (ref 30–96)
FOLATE SERPL-MCNC: 11.4 NG/ML (ref 4–24)
VIT B12 SERPL-MCNC: 1017 PG/ML (ref 210–950)

## 2024-11-10 ENCOUNTER — OFFICE VISIT (OUTPATIENT)
Dept: URGENT CARE | Facility: CLINIC | Age: 34
End: 2024-11-10
Payer: COMMERCIAL

## 2024-11-10 VITALS
BODY MASS INDEX: 31.77 KG/M2 | RESPIRATION RATE: 18 BRPM | OXYGEN SATURATION: 98 % | WEIGHT: 186.06 LBS | DIASTOLIC BLOOD PRESSURE: 70 MMHG | HEIGHT: 64 IN | HEART RATE: 92 BPM | SYSTOLIC BLOOD PRESSURE: 104 MMHG | TEMPERATURE: 99 F

## 2024-11-10 DIAGNOSIS — T14.8XXA WOUND INFECTION: Primary | ICD-10-CM

## 2024-11-10 DIAGNOSIS — L08.9 WOUND INFECTION: Primary | ICD-10-CM

## 2024-11-10 PROCEDURE — 99213 OFFICE O/P EST LOW 20 MIN: CPT | Mod: S$GLB,,,

## 2024-11-10 RX ORDER — SULFAMETHOXAZOLE AND TRIMETHOPRIM 800; 160 MG/1; MG/1
1 TABLET ORAL 2 TIMES DAILY
Qty: 14 TABLET | Refills: 0 | Status: SHIPPED | OUTPATIENT
Start: 2024-11-10 | End: 2024-11-17

## 2024-11-10 RX ORDER — MUPIROCIN 20 MG/G
OINTMENT TOPICAL 2 TIMES DAILY
Qty: 15 G | Refills: 0 | Status: SHIPPED | OUTPATIENT
Start: 2024-11-10

## 2024-11-10 NOTE — PATIENT INSTRUCTIONS
Take oral antibiotic as directed for the next 7 days.  Take full dose or symptoms will not get better. Take with food and water to decrease GI upset. Try a probiotic or eat daily yogurt to maintain good gut and vaginal hunter while on an antibiotic. Do not drink alcohol while taking an antibiotic.      Alternate tylenol and ibuprofen every 4 hours as needed for pain. Always take ibuprofen with food and water to avoid GI upset. Stop taking if you develop abdominal pain or blood in stool.     Cleanse wound once per day with gentle soap and water. Do not use peroxide to cleanse as this will disrupt the natural skin healing process. Cover during the day with non-stick dressing to avoid introduction of bacteria. You can leave open to air at night.     Apply antibiotic ointment twice per day x 7 days.     Monitor for worsening signs of infection such as redness, warmth, increase in pain, purulent drainage, fevers, chills, body aches.     Return to the clinic or follow up with PCP if not better/worsening after 2-3 days of antibiotic start.     Follow up with wound care this week. A referral was placed for you, call [phone] to schedule appointment.
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
## 2024-11-10 NOTE — LETTER
November 10, 2024      [facility] [address] [address]  Phone: [phone]  Fax: [phone]       Patient: Maryan Grady   YOB: 1990  Date of Visit: 11/10/2024    To Whom It May Concern:    Dina Grady  was at [facility] on 11/10/2024. The patient may return to work/school on 11/13/2024 with no restrictions. If you have any questions or concerns, or if I can be of further assistance, please do not hesitate to contact me.    Sincerely,    Marian Gerardo, NP

## 2025-02-25 ENCOUNTER — PATIENT OUTREACH (OUTPATIENT)
Dept: ADMINISTRATIVE | Facility: HOSPITAL | Age: 35
End: 2025-02-25
Payer: COMMERCIAL

## 2025-02-25 ENCOUNTER — PATIENT MESSAGE (OUTPATIENT)
Dept: ADMINISTRATIVE | Facility: HOSPITAL | Age: 35
End: 2025-02-25
Payer: COMMERCIAL

## 2025-04-24 ENCOUNTER — OFFICE VISIT (OUTPATIENT)
Dept: OBSTETRICS AND GYNECOLOGY | Facility: CLINIC | Age: 35
End: 2025-04-24
Payer: COMMERCIAL

## 2025-04-24 ENCOUNTER — LAB VISIT (OUTPATIENT)
Dept: LAB | Facility: OTHER | Age: 35
End: 2025-04-24
Attending: OBSTETRICS & GYNECOLOGY
Payer: COMMERCIAL

## 2025-04-24 ENCOUNTER — TELEPHONE (OUTPATIENT)
Dept: OBSTETRICS AND GYNECOLOGY | Facility: CLINIC | Age: 35
End: 2025-04-24
Payer: COMMERCIAL

## 2025-04-24 VITALS
SYSTOLIC BLOOD PRESSURE: 114 MMHG | BODY MASS INDEX: 31.84 KG/M2 | WEIGHT: 186.5 LBS | HEIGHT: 64 IN | DIASTOLIC BLOOD PRESSURE: 68 MMHG

## 2025-04-24 DIAGNOSIS — Z11.3 SCREENING EXAMINATION FOR STD (SEXUALLY TRANSMITTED DISEASE): ICD-10-CM

## 2025-04-24 DIAGNOSIS — N75.1 ABSCESS OF RIGHT BARTHOLIN GLAND: Primary | ICD-10-CM

## 2025-04-24 DIAGNOSIS — R10.2 VULVAR PAIN: ICD-10-CM

## 2025-04-24 LAB
HBV SURFACE AG SERPL QL IA: NORMAL
HIV 1+2 AB+HIV1 P24 AG SERPL QL IA: NEGATIVE
T PALLIDUM IGG+IGM SER QL: NEGATIVE

## 2025-04-24 PROCEDURE — 1159F MED LIST DOCD IN RCRD: CPT | Mod: CPTII,S$GLB,, | Performed by: OBSTETRICS & GYNECOLOGY

## 2025-04-24 PROCEDURE — 99213 OFFICE O/P EST LOW 20 MIN: CPT | Mod: 25,S$GLB,, | Performed by: OBSTETRICS & GYNECOLOGY

## 2025-04-24 PROCEDURE — 3074F SYST BP LT 130 MM HG: CPT | Mod: CPTII,S$GLB,, | Performed by: OBSTETRICS & GYNECOLOGY

## 2025-04-24 PROCEDURE — 86593 SYPHILIS TEST NON-TREP QUANT: CPT

## 2025-04-24 PROCEDURE — 87491 CHLMYD TRACH DNA AMP PROBE: CPT | Performed by: OBSTETRICS & GYNECOLOGY

## 2025-04-24 PROCEDURE — 87070 CULTURE OTHR SPECIMN AEROBIC: CPT | Performed by: OBSTETRICS & GYNECOLOGY

## 2025-04-24 PROCEDURE — 56420 I&D BARTHOLINS GLAND ABSCESS: CPT | Mod: S$GLB,,, | Performed by: OBSTETRICS & GYNECOLOGY

## 2025-04-24 PROCEDURE — 3078F DIAST BP <80 MM HG: CPT | Mod: CPTII,S$GLB,, | Performed by: OBSTETRICS & GYNECOLOGY

## 2025-04-24 PROCEDURE — 87340 HEPATITIS B SURFACE AG IA: CPT

## 2025-04-24 PROCEDURE — 99999 PR PBB SHADOW E&M-EST. PATIENT-LVL III: CPT | Mod: PBBFAC,,, | Performed by: OBSTETRICS & GYNECOLOGY

## 2025-04-24 PROCEDURE — 87389 HIV-1 AG W/HIV-1&-2 AB AG IA: CPT

## 2025-04-24 PROCEDURE — 3008F BODY MASS INDEX DOCD: CPT | Mod: CPTII,S$GLB,, | Performed by: OBSTETRICS & GYNECOLOGY

## 2025-04-24 PROCEDURE — 58999 UNLISTED PX FML GENITAL SYS: CPT | Mod: S$GLB,,, | Performed by: OBSTETRICS & GYNECOLOGY

## 2025-04-24 PROCEDURE — 36415 COLL VENOUS BLD VENIPUNCTURE: CPT

## 2025-04-24 PROCEDURE — 1160F RVW MEDS BY RX/DR IN RCRD: CPT | Mod: CPTII,S$GLB,, | Performed by: OBSTETRICS & GYNECOLOGY

## 2025-04-24 NOTE — TELEPHONE ENCOUNTER
----- Message from Priscila sent at 4/24/2025  8:29 AM CDT -----  Regarding: same day appt  Name of Who is Calling: Maryan What is the request in detail: Patient is requesting a call back to be seen today for vaginal cyst.  Can the clinic reply by [facility]: Yes What Number to Call Back if not in [facility]:  [phone]

## 2025-04-24 NOTE — PROGRESS NOTES
Subjective:       Patient ID: Maryan Grady is a 34 y.o. female.    Chief Complaint:  Cyst      History of Present Illness  35 yo  presents with  a 5 day history of a painful vaginal bump.  Notes it is painful to sit and to ambulate.  Denies fevers, chills.  Denies spontaneous drainage. Does report a history of a vulvar boil that occurred approximately 1 month ago.  That boil spontaneously resolved.     Problem List[1]    Past Medical History:   Diagnosis Date    GERD (gastroesophageal reflux disease)        Past Surgical History:   Procedure Laterality Date    EYE SURGERY      strabismus    THYROID LOBECTOMY Right 2023    Procedure: LOBECTOMY, THYROID;  Surgeon: Zaira Valera MD;  Location: [facility] OR [address] [place] [address];  Service: General;  Laterality: Right;       OB History    Para Term  AB Living   3 3 3   3   SAB IAB Ectopic Multiple Live Births      0 3      # Outcome Date GA Lbr Ruslan/2nd Weight Sex Type Anes PTL Lv   3 Term 21 40w0d  3.232 kg (7 lb 2 oz) F Vag-Spont EPI N NELLY   2 Term 19 39w3d  3.118 kg (6 lb 14 oz) M Vag-Spont EPI N NELLY   1 Term  40w3d  3.629 kg (8 lb) M Vag-Spont EPI  NELLY       Patient's last menstrual period was 2025.   Date of Last Pap: 2021    Review of Systems  Review of Systems   Constitutional:  Negative for activity change, appetite change, chills, fatigue and fever.   Respiratory:  Negative for shortness of breath.    Cardiovascular:  Negative for chest pain.   Gastrointestinal:  Negative for abdominal pain, constipation, diarrhea, nausea and vomiting.   Endocrine: Negative for cold intolerance and heat intolerance.   Genitourinary:  Positive for frequency and vaginal pain. Negative for dysuria, menstrual irregularity, pelvic pain, urgency and vaginal discharge.   Integumentary:  Negative for rash, breast mass, breast discharge and breast tenderness.   Psychiatric/Behavioral:  Negative for dysphoric mood. The patient is not  "nervous/anxious.    Breast: Negative for mass and tenderness       Objective:   /68   Ht 5' 4" (1.626 m)   Wt 84.6 kg (186 lb 8.2 oz)   LMP 04/04/2025   BMI 32.01 kg/m²   Body mass index is 32.01 kg/m².    APPEARANCE: Well nourished, well developed, in no acute distress.  PSYCH: Appropriate mood and affect.  SKIN: No acne or hirsutism  PELVIC: 3 cm fluctuance mass in right labia consistent with Bartholin's gland abscess    Procedure:  Consents reviewed and signed.   Area swabbed with Betadine and injected with 3 mL of lidocaine.  Adequate anesthesia achieved.  Abscess incised at the mucosal surface of the labial mucosa.  Large amount of purulent material expressed and loculations broken up.  Wound culture obtained.  Word catheter placed into cyst and inflated.   Patient tolerated well.   Minimal blood loss.        Results for orders placed or performed in visit on 10/17/24   C. trachomatis/N. gonorrhoeae by AMP DNA    Collection Time: 10/17/24  2:21 PM   Result Value Ref Range    Chlamydia, Amplified DNA Not Detected Not Detected    N gonorrhoeae, amplified DNA Not Detected Not Detected       Assessment/ Plan:     1. Abscess of right Bartholin gland        2. Vulvar pain        3. Screening examination for STD (sexually transmitted disease)  C. trachomatis/N. gonorrhoeae by AMP DNA [facility]; Urine    Hepatitis B Surface Antigen    HIV 1/2 Ag/Ab (4th Gen)    Treponema Pallidium Antibodies IgG, IgM        I&D with Word Catheter placement. Postprocedure instructions reviewed with patient.  Ideally to remain in place for 4 weeks.    Tylenol/Motrin as needed for pain.   Patient has WWE scheduled in 4 weeks with Dr. Salas. Advised to keep appointment.   STD screening per patient request.             Amanda N. Thomas, MD [facility]  04/24/2025    Answers submitted by the patient for this visit:  Vaginal Pain Questionnaire (Submitted on 4/24/2025)  Chief Complaint: Vaginal pain  Chronicity: " new  Onset: in the past 7 days  Frequency: constantly  Progression since onset: gradually worsening  Pain severity: severe  Affected side: right  Pregnant now?: No  anorexia: Yes  discolored urine: No  painful intercourse: Yes  Aggravated by: activity, intercourse, tactile pressure, urinating  treatments tried: warm bath  Improvement on treatment: no relief  Sexual activity: sexually active  Partner with STD symptoms: unknown  Birth control: nothing  STD: No  abdominal surgery: No   section: No  Ectopic pregnancy: No  Endometriosis: No  herpes simplex: No  gynecological surgery: No  menorrhagia: Yes  metrorrhagia: No  miscarriage: No  ovarian cysts: Yes  perineal abscess: No  PID: No  terminated pregnancy: Yes  vaginosis: No         [1]   Patient Active Problem List  Diagnosis    Neck pain    Multiple thyroid nodules    Vitamin D insufficiency    Class 1 obesity with body mass index (BMI) of 32.0 to 32.9 in adult

## 2025-04-25 ENCOUNTER — PATIENT MESSAGE (OUTPATIENT)
Dept: OBSTETRICS AND GYNECOLOGY | Facility: CLINIC | Age: 35
End: 2025-04-25
Payer: COMMERCIAL

## 2025-04-25 ENCOUNTER — RESULTS FOLLOW-UP (OUTPATIENT)
Dept: OBSTETRICS AND GYNECOLOGY | Facility: CLINIC | Age: 35
End: 2025-04-25

## 2025-04-26 LAB
BACTERIA SPEC AEROBE CULT: ABNORMAL
C TRACH DNA SPEC QL NAA+PROBE: NOT DETECTED
CTGC SOURCE (OHS) ORD-325: NORMAL
N GONORRHOEA DNA UR QL NAA+PROBE: NOT DETECTED

## 2025-05-20 NOTE — H&P (VIEW-ONLY)
Diagnosis:   1. Adenocarcinoma of pancreas  (CMD)       Regimen: Modified Folfirinox  Cycle/Day: D1C4    Dr. Garcia is ordering clinician today.    Vital Signs:  ONC OP Encounter Vitals  BP: 112/75  Heart Rate: (!) 100  Resp: 17  Temp: 97.2 °F (36.2 °C)  Temp src: Oral  SpO2: 96 %  Weight: 120.2 kg (264 lb 15.9 oz)  Height: 5' 6\" (1.676 m)  Pain Score:  0  BSA (Calculated - m2) - Haylie & Haylie: 2.26  BSA (Calculated - sq m): 2.38  BMI (Calculated): 43.13      Allergies:    ALLERGIES:   Allergen Reactions    Penicillins HIVES    Zostavax [Zoster Vaccine Live] Other (See Comments)        Medications:  The medication list was reviewed. No changes noted.     ECOG: ECOG Performance Status: 1    Distress Screening: Is this day one of cycle or a new regimen? Yes Distress Score No data recorded, Distress Screening Completed, and Please see distress flowsheet for further details    Toxicity Assessment:   Adverse Events?  Adverse Events: No    Auditory/Ear  Assessment: Yes (Within Defined Limits)    Cardiac General  Assessment: Yes (Within Defined Limits)    Dermatology/Skin  Assessment: Yes (Within Defined Limits)    Constitutional  Assessment: Yes (Within Defined Limits)    Endocrine  Assessment: Yes (Within Defined Limits)    Gastrointestinal  Assessment: Yes (w/ Exceptions to WDL)  Nausea: Grade 1    Hemorrhage/Bleeding  Assessment: Yes (Within Defined Limits)    Infection  Assessment: Yes (Within Defined Limits)    Lymphatics  Assessment: Yes (Within Defined Limits)    Musculoskeletal  Assessment: Yes (w/ Exceptions to WDL)  Generalized Muscle Weakness: Grade 1    Neurology  Assessment: Yes (w/ Exceptions to WDL)  Dizziness: Grade 1  Paresthesia: None Present    Ocular  Assessment: Yes (Within Defined Limits)    Pain  Assessment: Yes (Within Defined Limits)    Pulmonary/Upper Respiratory  Assessment: Yes (Within Defined Limits)    Genitourinary  Assessment: Yes (Within Defined Limits)      Additional Nursing Assessment:  Endocrine Surgery History & Physical     REFERRING PROVIDER: Hayden Becerril MD; Tita Gonzalez MD    REASON FOR VISIT: Right 2.2 cm thyroid nodule    HPI: Maryan Grady is a 32 y.o. otherwise healthy female who presents in consultation for management of thyroid nodule.     She was found to have a thyroid nodule in 4/19/22.  Biochemical testing of thyroid function including a TSH documented normal. Thyroid ultrasound revealed 2.2cm right mid thyroid nodule TR6.  Ultrasound-guided fine-needle aspiration biopsy revealed FLUS [place] III cytology.    The patient denies hyperthyroid or hypothyroid symptoms.  She has noticed a bulge in her right neck but denies dysphagia, globus sensation, compression symptoms or anterior neck pain.  The patient has no hoarseness, voice changes or increased need to clear the throat.  The patient has not had prior neck surgery.      The patient has no history of radiation exposure or goitrogens.  The patient has not recently been on lithium, biotin, amiodarone or received iodine contrast.  There not a significant history of thyroid cancer, thyroid disease or familial endocrinopathies.  Mother has hypothyroidism.    Calcium levels have been normal and she denies symptoms of hyperparathyroidism, therefore is likely low risk for concurrent parathyroid disease.      LABORATORY STUDIES:  I personally and independently reviewed relevant lab test results, including the following:    TSH   Date Value Ref Range Status   08/31/2022 2.373 0.400 - 4.000 uIU/mL Final   04/13/2022 1.670 0.400 - 4.000 uIU/mL Final     Free T4   Date Value Ref Range Status   08/31/2022 1.04 0.71 - 1.51 ng/dL Final   04/13/2022 0.97 0.71 - 1.51 ng/dL Final     Calcium   Date Value Ref Range Status   08/22/2022 9.0 8.7 - 10.5 mg/dL Final   08/06/2020 9.3 8.7 - 10.5 mg/dL Final     Albumin   Date Value Ref Range Status   08/22/2022 3.9 3.5 - 5.2 g/dL Final        PAST MEDICAL HISTORY:  Patient Active Problem  In addition to above toxicity assessment, this RN assessed (see flowsheets) .       Prechemo Checklist  Chemo Consent Signed: Yes  Protocol Verified: Yes  Is Protocol Standard of Care or Research?: Standard of Care  Pre-Chemo Labs Reviewed?: Yes  Provider Notified of Abnormal Labs Not Meeting Treatment Conditions: N/A  Pregnancy Screening Performed (if indicated): Not applicable  All Treatment Conditions Met?: Yes  BSA/weight in Orders Verified for Weight-Based Drugs?: Yes  Chemo Dose Calculations Verified: Yes  Second RN Verified Calculations: Alexandra RAYMOND      Pre-Treatment: Patient has valid pre-authorization, Premed orders, including hydration, are verified prior to administration, and I have reviewed the following with the patient: Name of chemo drug, duration and route of infusion, Infusion/Drug volume and dose, and reportable infusion-related symptoms.     Treatment: Refer to [facility] and MAR for line assessment and medication administration, Chemotherapy has not ; double checked & verified by two practitioners, Appearance and physical integrity of drugs meets standard of drug monograph; double checked & verified by two practitioners, Rate set on infusion pump is in alignment with ordered rate; double checked & verified by two practitioners, Drugs were administered in proper sequencing, Blood return confirmed before, during and after treatment administered, Infusion pump used for non-vesicant drugs, and Home ambulatory pump on discharge. 5FU Pump.  RN ensured all 3 clamps open on 5 FU pump. RN ensured tegaderm seured on skin.     Post Treatment: Treatment tolerated well; no adverse reaction    Oral Chemotherapy: No.    Education: Pt educated to make sure bulb slowly decreases throughout next 24 hours and that clamps stay open.       Next appointment scheduled:     2025 Pump Off   2025 Chemotherapy  Patient instructed to call the office with any questions or concerns.    Patient Discharged: patient  List   Diagnosis    Acute pharyngitis    Neck pain    Multiple thyroid nodules        PAST SURGICAL HISTORY:  Past Surgical History:   Procedure Laterality Date    EYE SURGERY      strabismus        MEDICATIONS:  Current Outpatient Medications   Medication Sig Dispense Refill    atovaquone-proguaniL (MALARONE) 250-100 mg Tab Take 1 tablet with food 1-2 days prior to exposure; take 1 tablet once a day during exposure; take 1 tablet once a day for 7 days after returning. (Patient not taking: Reported on 3/14/2023) 20 tablet 0    fluticasone propionate (FLONASE) 50 mcg/actuation nasal spray USE 1 SPRAY IN EACH NOSTRIL ONCE DAILY (Patient not taking: Reported on 8/22/2022) 48 g 1    ibuprofen (ADVIL,MOTRIN) 800 MG tablet Take 1 tablet (800 mg total) by mouth every 6 (six) hours as needed for Pain (throat and neck). (Patient not taking: Reported on 8/22/2022) 4 tablet 0     No current facility-administered medications for this visit.       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 No         FAMILY HISTORY:  Family History   Problem Relation Age of Onset    Hypothyroidism Mother     Hypertension Father     Melanoma Neg Hx     Breast cancer Neg Hx     Colon cancer Neg Hx     Ovarian cancer Neg Hx          REVIEW OF SYSTEMS:  A detailed review of systems was reviewed with the patient, pertinent positives and negatives are presented in the note and is  "otherwise negative.    PHYSICAL EXAMINATION:  Vital Signs: /66 (BP Location: Left arm, Patient Position: Sitting, BP Method: Medium (Automatic))   Pulse 84   Resp 18   Ht 5' 4" (1.626 m)   Wt 81.2 kg (179 lb 0.2 oz)   LMP 03/22/2023 (Approximate)   SpO2 98%   BMI 30.73 kg/m²     Constitutional: no acute distress, comfortable, well appearing  HENT: no lid lag, no exophthalmos, no scleral icterus, moist mucous membranes, normal dentition  Neck: supple, trachea in midline, thyroid is palpable and moves well with swallowing, right thyroid nodule palpable, normal neck extension  Heme/Lymph: no cervical or supraclavicular lymphadenopathy  Respiratory: normal respiratory effort, no wheezes or stridor  Cardiovascular: regular rate and rhythm  Extremities: no edema  Skin: warm and dry, no rashes  Neurologic: no resting tremor of outstretched hands, voice normal  Vascular: radial pulses palpable bilaterally  Psychiatric: affect normal    IMAGING STUDIES:  I personally and independently reviewed, visualized and interpreted the images of the below listed radiology studies (including US) and my findings are notable for 2.2cm thyroid nodule of the right lobe with irregular margins and possible extrathyroidal extension, no suspicious lymphadenopathy, small sub-centimeter left thyroid nodule.  Reports below for reference.    US Soft Tissue Head Neck Thyroid 04/19/2022  Impression  Right midpole 2.2 cm thyroid nodule, correlating with the palpable abnormality.  FNA biopsy recommended per ACR criteria.    CYTOLOGY:  Final Pathologic Diagnosis   Date Value Ref Range Status   04/27/2022 (A)  Final    [facility] [facility] Thyroid Cytology Category: Follicular Lesion of Undetermined  Significance (FLUS)  Follicular cells, some with Hurthle cell change, in a predominantly  microfollicular arrangement with scant colloid, most consistent with a  follicular lesion of undetermined significance.       Comment:     Interp By ZANDRA " discharged to home per self, ambulatory, per wheelchair, with family member in stable condition.    Joanna Campoverde MD, Signed on 05/06/2022 at 09:39       IMPRESSION:  I had the pleasure of seeing Ms. Grady in endocrine surgical consultation regarding her thyroid nodule.  I have discussed with Ms. Grady at length regarding the current surgical and non-surgical treatment options.  Based on the current clinical findings and patient's preference I recommend a right thryoid lobectomy.     The patient has a thyroid nodule showing FLUS. I discussed with the patient the risk of malignancy with this diagnosis, ranging from 5-15%%.   Notably, the nodule does appear to have an irregular anterior margin and possible extrathyroidal extension and thus fine needle aspiration cannot determine the risk of malignancy with more accuracy. In order for us to determine whether this is malignant or benign, surgical resection is recommended.  I recommend a diagnostic right thyroid lobectomy.  The patient understood that once final pathology will be obtained, if this documents significant malignancy, a completion thyroidectomy may be necessary. This would be done at a later point.    I discussed with the patient the expected perioperative course. A thyroid lobectomy can be performed as an outpatient, if the patient tolerates it well.  The possible complications associated with this may include, but may not be restricted to hoarseness, recurrent laryngeal nerve or superior laryngeal nerve injury - temporary or permanent, neck hematoma, infection, scarring, death and imponderables.  Hypocalcemia would be a risk if the patient would need a total or completion thyroidectomy.  The patient understood that thyroid hormone replacement may be necessary even after a thyroid lobectomy, and definitely after a total or completion thyroidectomy. Thyroid function will need to be monitored.     All questions were answered and the patient expressed understanding of all the risks, benefits and alternatives, and agreed to proceed with the plan  despite the risks.  Surgical consent was signed and witnessed.    Problem List Items Addressed This Visit          Endocrine    Multiple thyroid nodules     Ms. Grady is our 31yo female who presents for a 2.2cm right thyroid nodule that is TR6 and FLUS cytology.  Given its imaging characteristics and biopsy results, I recommend a right thyroid lobectomy for final pathologic diagnosis.  There is a small sub-centimeter left thyroid nodule without suspicious features.     - Consent obtained in clinic today, all questions and concerns addressed  - OR for right thyroid lobectomy  - Preop labs: CBC, CMP, vit D          Other Visit Diagnoses       Thyroid nodule greater than or equal to 1.5 cm in diameter incidentally noted on imaging study            Patient was seen and evaluated with surgery resident Juan Manuel Short MD.    Zaira Valera MD  [facility] Surgeon  Endocrine Surgery  3/23/23

## 2025-05-28 ENCOUNTER — OFFICE VISIT (OUTPATIENT)
Dept: OBSTETRICS AND GYNECOLOGY | Facility: CLINIC | Age: 35
End: 2025-05-28
Payer: COMMERCIAL

## 2025-05-28 VITALS
SYSTOLIC BLOOD PRESSURE: 117 MMHG | HEIGHT: 64 IN | DIASTOLIC BLOOD PRESSURE: 71 MMHG | WEIGHT: 187.19 LBS | BODY MASS INDEX: 31.96 KG/M2

## 2025-05-28 DIAGNOSIS — N39.3 URINARY, INCONTINENCE, STRESS FEMALE: ICD-10-CM

## 2025-05-28 DIAGNOSIS — N89.8 VAGINAL ODOR: ICD-10-CM

## 2025-05-28 DIAGNOSIS — Z12.4 CERVICAL CANCER SCREENING: ICD-10-CM

## 2025-05-28 DIAGNOSIS — N75.1 ABSCESS OF RIGHT BARTHOLIN GLAND: ICD-10-CM

## 2025-05-28 DIAGNOSIS — Z01.419 WELL WOMAN EXAM WITH ROUTINE GYNECOLOGICAL EXAM: Primary | ICD-10-CM

## 2025-05-28 PROCEDURE — 99999 PR PBB SHADOW E&M-EST. PATIENT-LVL III: CPT | Mod: PBBFAC,,,

## 2025-05-28 PROCEDURE — 3008F BODY MASS INDEX DOCD: CPT | Mod: CPTII,S$GLB,,

## 2025-05-28 PROCEDURE — 1160F RVW MEDS BY RX/DR IN RCRD: CPT | Mod: CPTII,S$GLB,,

## 2025-05-28 PROCEDURE — 1159F MED LIST DOCD IN RCRD: CPT | Mod: CPTII,S$GLB,,

## 2025-05-28 PROCEDURE — 3074F SYST BP LT 130 MM HG: CPT | Mod: CPTII,S$GLB,,

## 2025-05-28 PROCEDURE — 81515 NFCT DS BV&VAGINITIS DNA ALG: CPT

## 2025-05-28 PROCEDURE — 99395 PREV VISIT EST AGE 18-39: CPT | Mod: S$GLB,,,

## 2025-05-28 PROCEDURE — 3078F DIAST BP <80 MM HG: CPT | Mod: CPTII,S$GLB,,

## 2025-05-28 PROCEDURE — 87624 HPV HI-RISK TYP POOLED RSLT: CPT

## 2025-05-28 RX ORDER — CEPHALEXIN 500 MG/1
500 CAPSULE ORAL 3 TIMES DAILY
Qty: 21 CAPSULE | Refills: 0 | Status: SHIPPED | OUTPATIENT
Start: 2025-05-28 | End: 2025-06-04

## 2025-05-28 NOTE — PROGRESS NOTES
CC: Annual    HPI: Pt is a 34 y.o.  female who presents for routine annual exam. Periods once monthly, lasting about 7 days. She is SA with one male partner. She does not want STD screening, had testing last month. Does report urinary incontinence with laughing, working out. Does report vaginal odor. Also reports she had batholins cyst to the right side last month, was drained. States now she feels one to the left, and right has not fully gone away. 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     FH:   Breast cancer: none  Colon cancer: none  Ovarian cancer: none  Uterine cancer: none    HPV vaccine: yes in [place]    Last pap smear: 3/29/21- NILM  History of abnormal pap smears: around , colpo - normal  Colonoscopy: n/a  DEXA: n/a  Mammogram: n/a  STD history: none   Birth control: none   OB history:   Tobacco use: none     ROS:  GENERAL: Feeling well overall. Denies fever or chills.   SKIN: Denies rash or lesions.   HEAD: Denies head injury or headache.   NODES: Denies enlarged lymph nodes.   CHEST: Denies chest pain or shortness of breath.   CARDIOVASCULAR: Denies palpitations or left sided chest pain.   ABDOMEN: No abdominal pain, constipation, diarrhea, nausea, vomiting or rectal bleeding.   URINARY: No dysuria, hematuria, or burning on urination.  REPRODUCTIVE: See HPI.   BREASTS: Denies pain, lumps, or nipple discharge.   HEMATOLOGIC: No easy bruisability or excessive bleeding.   MUSCULOSKELETAL: Denies joint pain or swelling.   NEUROLOGIC: Denies syncope or weakness.   PSYCHIATRIC: Denies depression, anxiety or mood swings.    PE: Female chaperone present for exam  APPEARANCE: Well nourished, well developed, Other female in no acute distress.  NODES: no cervical, supraclavicular, or inguinal lymphadenopathy  BREASTS: Symmetrical, no skin changes or visible lesions. No palpable masses, nipple discharge or adenopathy bilaterally.  ABDOMEN: Soft. No tenderness or masses. No distention. No  hernias palpated.   VULVA: Normal external female genitalia. ~ 5mm cyst to right labia- scant drainage noted, ~ 5mm cyst to left labia   URETHRAL MEATUS: Normal size and location, no lesions, no prolapse.  URETHRA: No masses, tenderness, or prolapse.  VAGINA: Moist. No lesions or lacerations noted. No abnormal discharge present. No odor present.   CERVIX: No lesions or discharge. No cervical motion tenderness.   UTERUS: Normal size, regular shape, mobile, non-tender.  ADNEXA: No tenderness. No fullness or masses palpated in the adnexal regions.   ANUS PERINEUM: Normal.    Diagnosis:  1. Well woman exam with routine gynecological exam    2. Cervical cancer screening    3. Urinary, incontinence, stress female    4. Vaginal odor    5. Abscess of right Bartholin gland        Plan:     Orders Placed This Encounter    Vaginosis Screen by DNA Probe    Ambulatory referral/consult to Urogynecology    Liquid-Based Pap Smear, Screening    cephALEXin (KEFLEX) 500 MG capsule     - Pap updated   - Referral to UroGyn for stress incontinence   - AFFIRm collected due to odor   - Discussed warm compress/ sitz baths for cysts- Keflex sent     Patient was counseled today on the new ACS guidelines for cervical cytology screening as well as the current recommendations for breast cancer screening. She was counseled to follow up with her PCP for other routine health maintenance. Counseling session lasted approximately 10 minutes, and all her questions were answered.  For women over the age of 65, you can stop having cervical cancer screenings if you have never had abnormal cervical cells or cervical cancer, and youve had three negative Pap tests in a row. (You also can stop screening if youve had two negative Pap and HPV tests in a row in the past 10 years, with at least one test in the past 5 years.),    Follow-up with me in 1 year for routine exam; pap in 3 years.       Erna Garcia, ZULY-C  OBGYN

## 2025-05-30 LAB
BACTERIAL VAGINOSIS DNA (OHS): DETECTED
CANDIDA GLABRATA/KRUSEI DNA (OHS): NOT DETECTED
CANDIDA SPECIES DNA (OHS): NOT DETECTED
TRICHOMONAS VAGINALIS DNA (OHS): NOT DETECTED

## 2025-06-02 ENCOUNTER — RESULTS FOLLOW-UP (OUTPATIENT)
Dept: OBSTETRICS AND GYNECOLOGY | Facility: CLINIC | Age: 35
End: 2025-06-02

## 2025-06-02 DIAGNOSIS — N76.0 BV (BACTERIAL VAGINOSIS): Primary | ICD-10-CM

## 2025-06-02 DIAGNOSIS — B96.89 BV (BACTERIAL VAGINOSIS): Primary | ICD-10-CM

## 2025-06-02 RX ORDER — METRONIDAZOLE 7.5 MG/G
1 GEL VAGINAL NIGHTLY
Qty: 5 APPLICATOR | Refills: 0 | Status: SHIPPED | OUTPATIENT
Start: 2025-06-02 | End: 2025-06-07

## 2025-07-24 ENCOUNTER — OFFICE VISIT (OUTPATIENT)
Dept: UROGYNECOLOGY | Facility: CLINIC | Age: 35
End: 2025-07-24
Payer: COMMERCIAL

## 2025-07-24 VITALS
HEART RATE: 86 BPM | BODY MASS INDEX: 31.96 KG/M2 | SYSTOLIC BLOOD PRESSURE: 107 MMHG | WEIGHT: 187.19 LBS | HEIGHT: 64 IN | DIASTOLIC BLOOD PRESSURE: 73 MMHG

## 2025-07-24 DIAGNOSIS — R35.0 URINARY FREQUENCY: Primary | ICD-10-CM

## 2025-07-24 DIAGNOSIS — N39.46 MIXED INCONTINENCE URGE AND STRESS: ICD-10-CM

## 2025-07-24 LAB
BILIRUBIN, UA POC OHS: NEGATIVE
BLOOD, UA POC OHS: ABNORMAL
CLARITY, UA POC OHS: CLEAR
COLOR, UA POC OHS: YELLOW
GLUCOSE, UA POC OHS: NEGATIVE
KETONES, UA POC OHS: NEGATIVE
LEUKOCYTES, UA POC OHS: ABNORMAL
NITRITE, UA POC OHS: NEGATIVE
PH, UA POC OHS: 6
PROTEIN, UA POC OHS: NEGATIVE
SPECIFIC GRAVITY, UA POC OHS: 1.02
UROBILINOGEN, UA POC OHS: 0.2

## 2025-07-24 PROCEDURE — 99999 PR PBB SHADOW E&M-EST. PATIENT-LVL IV: CPT | Mod: PBBFAC,,, | Performed by: NURSE PRACTITIONER

## 2025-07-24 NOTE — PROGRESS NOTES
Subjective:       Patient ID: Maryan Grady is a 34 y.o. female.    Chief Complaint: incontinence      Maryan Grady is a 34 y.o. female.  Who is new to me, presents today for consult in regards to incontinence.  Patient states that after she had her 3rd child she started to have some stress incontinence with tasks like jumping rope or exercising.  But she is also having urge incontinence.  She is having about 4 leaks a day.  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  Whenever she gets home from work she does go more frequently and tends to drink a little bit more in the evening time again due to her schedule.  She has nocturia times 2-3.  She does have some PVF at times.  She denies any history of recurrent UTI or kidney stones.  She drinks mostly water but does have some tea.  She has rare coffee and rare alcohol.  She denies any vaginal discharge bleeding or bulging sensation.  She has had Bartholin cyst but these have since resolved.  She will occasionally have some dyspareunia.  NP did review with patient trying medication for OAB.  Patient would like to try pelvic floor PT 1st.  NP will send the referral to pelvic floor PT.    Review of Systems   Constitutional:  Negative for activity change, fever and unexpected weight change.   HENT:  Negative for hearing loss.    Eyes:  Negative for visual disturbance.   Respiratory:  Negative for shortness of breath and wheezing.    Cardiovascular:  Negative for chest pain, palpitations and leg swelling.   Gastrointestinal:  Negative for abdominal pain, constipation and diarrhea.   Genitourinary:  Positive for frequency and urgency. Negative for dyspareunia, dysuria, vaginal bleeding and vaginal discharge.   Musculoskeletal:  Negative for gait problem and neck pain.   Skin:  Negative for rash and wound.   Allergic/Immunologic: Negative for immunocompromised state.   Neurological:  Negative for tremors, speech difficulty and  weakness.   Hematological:  Does not bruise/bleed easily.   Psychiatric/Behavioral:  Negative for agitation and confusion.        Objective:      Physical Exam  Vitals reviewed. Exam conducted with a chaperone present.   Constitutional:       General: She is not in acute distress.     Appearance: She is well-developed.   HENT:      Head: Normocephalic and atraumatic.   Neck:      Thyroid: No thyromegaly.   Pulmonary:      Effort: Pulmonary effort is normal. No respiratory distress.   Abdominal:      Palpations: Abdomen is soft.      Tenderness: There is no abdominal tenderness.      Hernia: No hernia is present.   Musculoskeletal:         General: Normal range of motion.      Cervical back: Normal range of motion.   Skin:     General: Skin is warm and dry.      Findings: No rash.   Neurological:      Mental Status: She is alert and oriented to person, place, and time.   Psychiatric:         Mood and Affect: Mood normal.         Behavior: Behavior normal.         Thought Content: Thought content normal.       Pelvic Exam:  V: No lesions. No palpable nodes.   Va:  No discharge or bleeding.  Good length and support.  Meatus:No caruncle or stenosis  Urethra: Non tender. No suburethral masses.  No real hypermobility.  No CONNOR in supine position  Cx/Cuff: Normal   Uterus:  Nontender  Ad: No mass or tenderness.  Levators :Symmetrical. Normal tone. Non tender.  BL: Non tender  RV: No hemorrhoids.      Assessment:       1. Urinary frequency    2. Mixed incontinence urge and stress        Plan:       Urinary frequency trial of pelvic floor PT  -     Ambulatory Referral/Consult to Physical Therapy; Future; Expected date: 07/31/2025    Mixed incontinence urge and stress trial of pelvic floor PT  -     Ambulatory referral/consult to Urogynecology  -     POCT Urinalysis(Instrument)  -     Ambulatory Referral/Consult to Physical Therapy; Future; Expected date: 07/31/2025      RTC 4 months          This note was primarily composed  with dictation software.  Grammatical errors may exist.

## (undated) DEVICE — NDL HYPO REG 25G X 1 1/2

## (undated) DEVICE — CHLORAPREP 10.5 ML APPLICATOR

## (undated) DEVICE — DRAPE HALF SURGICAL 40X58IN

## (undated) DEVICE — SUT 3-0 12-18IN SILK

## (undated) DEVICE — DRAPE STERI INSTRUMENT 1018

## (undated) DEVICE — SUT 2/0 30IN SILK BLK BRAI

## (undated) DEVICE — CLIP LIGACLIP XTRA TITANIUM

## (undated) DEVICE — FORCEP STRAIGHT DISP

## (undated) DEVICE — TRAY MINOR GEN SURG OMC

## (undated) DEVICE — DRAPE CORETEMP FLD WRM 56X62IN

## (undated) DEVICE — ELECTRODE REM PLYHSV RETURN 9

## (undated) DEVICE — SUT 4-0 12-18IN SILK BLACK

## (undated) DEVICE — PROBE SIMULATOR KRAFF

## (undated) DEVICE — CONTAINER SPECIMEN OR STER 4OZ

## (undated) DEVICE — GAUZE SPONGE PEANUT STRL

## (undated) DEVICE — SEE MEDLINE ITEM 157194

## (undated) DEVICE — SUT LIGACLIP SMALL XTRA

## (undated) DEVICE — TUBE EMG NIM 7.0MM TRIVANTAGE

## (undated) DEVICE — DRESSING TRANS 4X4 TEGADERM

## (undated) DEVICE — DRAPE EENT SPLIT STERILE

## (undated) DEVICE — SUT VICRYL 6-0 P1 18IN UD

## (undated) DEVICE — MARKER SKIN STND TIP BLUE BARR

## (undated) DEVICE — PAD CURAD NONADH 3X4IN

## (undated) DEVICE — SUT VICRYL 3-0 27 SH

## (undated) DEVICE — DISSECTOR LIGASURE EXACT 21CM

## (undated) DEVICE — CORD BIPOLAR 12 FOOT

## (undated) DEVICE — ADHESIVE DERMABOND ADVANCED

## (undated) DEVICE — ELECTRODE BLADE INSULATED 1 IN